# Patient Record
Sex: MALE | Race: AMERICAN INDIAN OR ALASKA NATIVE | NOT HISPANIC OR LATINO | Employment: PART TIME | URBAN - METROPOLITAN AREA
[De-identification: names, ages, dates, MRNs, and addresses within clinical notes are randomized per-mention and may not be internally consistent; named-entity substitution may affect disease eponyms.]

---

## 2021-09-28 ENCOUNTER — TELEPHONE (OUTPATIENT)
Dept: EMERGENCY MEDICINE | Facility: CLINIC | Age: 46
End: 2021-09-28

## 2021-09-28 ENCOUNTER — NURSE TRIAGE (OUTPATIENT)
Dept: NURSING | Facility: CLINIC | Age: 46
End: 2021-09-28

## 2021-09-28 ENCOUNTER — HOSPITAL ENCOUNTER (EMERGENCY)
Facility: CLINIC | Age: 46
Discharge: HOME | End: 2021-09-28
Attending: EMERGENCY MEDICINE | Admitting: EMERGENCY MEDICINE
Payer: COMMERCIAL

## 2021-09-28 ENCOUNTER — APPOINTMENT (OUTPATIENT)
Dept: GENERAL RADIOLOGY | Facility: CLINIC | Age: 46
End: 2021-09-28
Attending: EMERGENCY MEDICINE
Payer: COMMERCIAL

## 2021-09-28 ENCOUNTER — PATIENT OUTREACH (OUTPATIENT)
Dept: CARE COORDINATION | Facility: CLINIC | Age: 46
End: 2021-09-28

## 2021-09-28 VITALS
DIASTOLIC BLOOD PRESSURE: 73 MMHG | HEIGHT: 68 IN | WEIGHT: 160 LBS | SYSTOLIC BLOOD PRESSURE: 105 MMHG | HEART RATE: 84 BPM | BODY MASS INDEX: 24.25 KG/M2 | OXYGEN SATURATION: 97 % | RESPIRATION RATE: 26 BRPM

## 2021-09-28 DIAGNOSIS — J18.9 PNEUMONIA OF RIGHT MIDDLE LOBE DUE TO INFECTIOUS ORGANISM: ICD-10-CM

## 2021-09-28 DIAGNOSIS — Z20.822 SUSPECTED COVID-19 VIRUS INFECTION: ICD-10-CM

## 2021-09-28 DIAGNOSIS — U07.1 CLINICAL DIAGNOSIS OF COVID-19: ICD-10-CM

## 2021-09-28 DIAGNOSIS — E87.6 HYPOKALEMIA: ICD-10-CM

## 2021-09-28 LAB
ALBUMIN SERPL-MCNC: 3.2 G/DL (ref 3.4–5)
ALP SERPL-CCNC: 50 U/L (ref 40–150)
ALT SERPL W P-5'-P-CCNC: 29 U/L (ref 0–70)
ANION GAP SERPL CALCULATED.3IONS-SCNC: 6 MMOL/L (ref 3–14)
AST SERPL W P-5'-P-CCNC: 28 U/L (ref 0–45)
BASOPHILS # BLD AUTO: 0 10E3/UL (ref 0–0.2)
BASOPHILS NFR BLD AUTO: 0 %
BILIRUB SERPL-MCNC: 0.2 MG/DL (ref 0.2–1.3)
BUN SERPL-MCNC: 14 MG/DL (ref 7–30)
CALCIUM SERPL-MCNC: 7.8 MG/DL (ref 8.5–10.1)
CHLORIDE BLD-SCNC: 107 MMOL/L (ref 94–109)
CO2 SERPL-SCNC: 24 MMOL/L (ref 20–32)
CREAT SERPL-MCNC: 1.1 MG/DL (ref 0.66–1.25)
D DIMER PPP FEU-MCNC: <0.27 UG/ML FEU (ref 0–0.5)
EOSINOPHIL # BLD AUTO: 0 10E3/UL (ref 0–0.7)
EOSINOPHIL NFR BLD AUTO: 0 %
ERYTHROCYTE [DISTWIDTH] IN BLOOD BY AUTOMATED COUNT: 11.7 % (ref 10–15)
GFR SERPL CREATININE-BSD FRML MDRD: 80 ML/MIN/1.73M2
GLUCOSE BLD-MCNC: 153 MG/DL (ref 70–99)
HCT VFR BLD AUTO: 40.6 % (ref 40–53)
HGB BLD-MCNC: 14.8 G/DL (ref 13.3–17.7)
IMM GRANULOCYTES # BLD: 0 10E3/UL
IMM GRANULOCYTES NFR BLD: 0 %
LYMPHOCYTES # BLD AUTO: 0.3 10E3/UL (ref 0.8–5.3)
LYMPHOCYTES NFR BLD AUTO: 10 %
MCH RBC QN AUTO: 29.9 PG (ref 26.5–33)
MCHC RBC AUTO-ENTMCNC: 36.5 G/DL (ref 31.5–36.5)
MCV RBC AUTO: 82 FL (ref 78–100)
MONOCYTES # BLD AUTO: 0.3 10E3/UL (ref 0–1.3)
MONOCYTES NFR BLD AUTO: 10 %
NEUTROPHILS # BLD AUTO: 2.3 10E3/UL (ref 1.6–8.3)
NEUTROPHILS NFR BLD AUTO: 80 %
NRBC # BLD AUTO: 0 10E3/UL
NRBC BLD AUTO-RTO: 0 /100
PLATELET # BLD AUTO: 160 10E3/UL (ref 150–450)
POTASSIUM BLD-SCNC: 3.2 MMOL/L (ref 3.4–5.3)
PROT SERPL-MCNC: 6.6 G/DL (ref 6.8–8.8)
RBC # BLD AUTO: 4.95 10E6/UL (ref 4.4–5.9)
SARS-COV-2 RNA RESP QL NAA+PROBE: POSITIVE
SODIUM SERPL-SCNC: 137 MMOL/L (ref 133–144)
WBC # BLD AUTO: 2.8 10E3/UL (ref 4–11)

## 2021-09-28 PROCEDURE — 36415 COLL VENOUS BLD VENIPUNCTURE: CPT | Performed by: EMERGENCY MEDICINE

## 2021-09-28 PROCEDURE — 85025 COMPLETE CBC W/AUTO DIFF WBC: CPT | Performed by: EMERGENCY MEDICINE

## 2021-09-28 PROCEDURE — 250N000013 HC RX MED GY IP 250 OP 250 PS 637: Performed by: EMERGENCY MEDICINE

## 2021-09-28 PROCEDURE — U0003 INFECTIOUS AGENT DETECTION BY NUCLEIC ACID (DNA OR RNA); SEVERE ACUTE RESPIRATORY SYNDROME CORONAVIRUS 2 (SARS-COV-2) (CORONAVIRUS DISEASE [COVID-19]), AMPLIFIED PROBE TECHNIQUE, MAKING USE OF HIGH THROUGHPUT TECHNOLOGIES AS DESCRIBED BY CMS-2020-01-R: HCPCS | Performed by: EMERGENCY MEDICINE

## 2021-09-28 PROCEDURE — 82040 ASSAY OF SERUM ALBUMIN: CPT | Performed by: EMERGENCY MEDICINE

## 2021-09-28 PROCEDURE — C9803 HOPD COVID-19 SPEC COLLECT: HCPCS | Performed by: EMERGENCY MEDICINE

## 2021-09-28 PROCEDURE — 93005 ELECTROCARDIOGRAM TRACING: CPT | Performed by: EMERGENCY MEDICINE

## 2021-09-28 PROCEDURE — 71045 X-RAY EXAM CHEST 1 VIEW: CPT

## 2021-09-28 PROCEDURE — 99285 EMERGENCY DEPT VISIT HI MDM: CPT | Mod: 25 | Performed by: EMERGENCY MEDICINE

## 2021-09-28 PROCEDURE — 93010 ELECTROCARDIOGRAM REPORT: CPT | Performed by: EMERGENCY MEDICINE

## 2021-09-28 PROCEDURE — 85379 FIBRIN DEGRADATION QUANT: CPT | Performed by: EMERGENCY MEDICINE

## 2021-09-28 RX ORDER — DOXYCYCLINE 100 MG/1
100 CAPSULE ORAL 2 TIMES DAILY
Qty: 10 CAPSULE | Refills: 0 | Status: ON HOLD | OUTPATIENT
Start: 2021-09-28 | End: 2021-10-10

## 2021-09-28 RX ORDER — POTASSIUM CHLORIDE 1500 MG/1
40 TABLET, EXTENDED RELEASE ORAL ONCE
Status: COMPLETED | OUTPATIENT
Start: 2021-09-28 | End: 2021-09-28

## 2021-09-28 RX ORDER — DOXYCYCLINE 100 MG/1
100 TABLET ORAL 2 TIMES DAILY
Qty: 10 TABLET | Refills: 0 | Status: SHIPPED | OUTPATIENT
Start: 2021-09-28 | End: 2021-10-04

## 2021-09-28 RX ADMIN — POTASSIUM CHLORIDE 40 MEQ: 20 TABLET, EXTENDED RELEASE ORAL at 05:58

## 2021-09-28 ASSESSMENT — ENCOUNTER SYMPTOMS
SHORTNESS OF BREATH: 1
LIGHT-HEADEDNESS: 1
EYE REDNESS: 0
DIARRHEA: 1
SORE THROAT: 0
DIFFICULTY URINATING: 0
MYALGIAS: 1
FEVER: 1
COUGH: 1
EYE PAIN: 0

## 2021-09-28 ASSESSMENT — MIFFLIN-ST. JEOR: SCORE: 1575.26

## 2021-09-28 NOTE — ED PROVIDER NOTES
Emergency Department Patient Sign-out       Brief HPI:  This is a 46 year old male signed out to me by Dr. PEG Chang at shift change at 6am .  See initial ED Provider note for details of the presentation, ED course and work-up. Plan of care at hand off is anticipate discharge if negative D.dimer with plan to treat for clinical suspicion with COVID-19 illness/ pneumonia. If positive D.Dimer further work-up to exclude pulmonary embolism vs CT imaging to evaluate for COVID-19 pneumonia. Patient presented with 5-day history of symptoms suspicious for COVID-19 illness.  Rapid COVID-19 testing is pending. Chest imaging revealing a right mid lung infiltrate laterally.  Patient had reported a witnessed syncopal event while sitting on the toilet which was observed by his spouse.  Due to unexplained syncope with reassuring EKG with suspicion of COVID-19 illness work-up was undertaken to exclude venous thromboembolism including pulmonary embolism. Hypokalemia- repleted.       Significant Events after my assuming care: D-dimer resulted and negative.  Patient was seen and examined after lab results and we discussed plan of care to treat for COVID-19 illness with presumption of secondary pneumonia (query atypical) with doxycycline twice daily x5 days pending confirmation of patient's COVID-19 test.  Lab, work-up, x-ray imaging and plan of care as discussed with the initial treating provider was reviewed with the patient.  We discussed quarantine measures and notification of coworkers and recent close contacts.  We also reviewed the importance of vaccinations patient confirmed that he is unvaccinated for COVID-19.  Patient inquired about therapies including N-acetylcysteine and colloidal silver and we had a long discussion about the benefits of COVID-19 vaccination.  Supportive care measures were reviewed including antipyretic dosing.   We also discussed establishing primary care and primary care referral was placed.   "Patient had no respiratory distress or increased work of breathing he was otherwise hemodynamically normal.   Patient expressed comfort, understanding agreement with the plan of care    Exam:   Patient Vitals for the past 24 hrs:   BP Temp src Pulse Resp SpO2 Height Weight   09/28/21 0434 113/74 Oral 90 18 98 % 1.727 m (5' 8\") 72.6 kg (160 lb)       ED RESULTS:   Results for orders placed or performed during the hospital encounter of 09/28/21 (from the past 24 hour(s))   CBC with platelets differential     Status: Abnormal    Collection Time: 09/28/21  5:14 AM    Narrative    The following orders were created for panel order CBC with platelets differential.  Procedure                               Abnormality         Status                     ---------                               -----------         ------                     CBC with platelets and d...[[id number]]  Abnormal            Final result                 Please view results for these tests on the individual orders.   Comprehensive metabolic panel     Status: Abnormal    Collection Time: 09/28/21  5:14 AM   Result Value Ref Range    Sodium 137 133 - 144 mmol/L    Potassium 3.2 (L) 3.4 - 5.3 mmol/L    Chloride 107 94 - 109 mmol/L    Carbon Dioxide (CO2) 24 20 - 32 mmol/L    Anion Gap 6 3 - 14 mmol/L    Urea Nitrogen 14 7 - 30 mg/dL    Creatinine 1.10 0.66 - 1.25 mg/dL    Calcium 7.8 (L) 8.5 - 10.1 mg/dL    Glucose 153 (H) 70 - 99 mg/dL    Alkaline Phosphatase 50 40 - 150 U/L    AST 28 0 - 45 U/L    ALT 29 0 - 70 U/L    Protein Total 6.6 (L) 6.8 - 8.8 g/dL    Albumin 3.2 (L) 3.4 - 5.0 g/dL    Bilirubin Total 0.2 0.2 - 1.3 mg/dL    GFR Estimate 80 >60 mL/min/1.73m2   CBC with platelets and differential     Status: Abnormal    Collection Time: 09/28/21  5:14 AM   Result Value Ref Range    WBC Count 2.8 (L) 4.0 - 11.0 10e3/uL    RBC Count 4.95 4.40 - 5.90 10e6/uL    Hemoglobin 14.8 13.3 - 17.7 g/dL    Hematocrit 40.6 40.0 - 53.0 %    MCV 82 78 - 100 fL    MCH " 29.9 26.5 - 33.0 pg    MCHC 36.5 31.5 - 36.5 g/dL    RDW 11.7 10.0 - 15.0 %    Platelet Count 160 150 - 450 10e3/uL    % Neutrophils 80 %    % Lymphocytes 10 %    % Monocytes 10 %    % Eosinophils 0 %    % Basophils 0 %    % Immature Granulocytes 0 %    NRBCs per 100 WBC 0 <1 /100    Absolute Neutrophils 2.3 1.6 - 8.3 10e3/uL    Absolute Lymphocytes 0.3 (L) 0.8 - 5.3 10e3/uL    Absolute Monocytes 0.3 0.0 - 1.3 10e3/uL    Absolute Eosinophils 0.0 0.0 - 0.7 10e3/uL    Absolute Basophils 0.0 0.0 - 0.2 10e3/uL    Absolute Immature Granulocytes 0.0 <=0.0 10e3/uL    Absolute NRBCs 0.0 10e3/uL   XR Chest Port 1 View     Status: None    Collection Time: 09/28/21  5:25 AM    Narrative    EXAM: XR CHEST PORT 1 VIEW  LOCATION: [facility]  DATE/TIME: 9/28/2021 5:11 AM    INDICATION: fever and cough  COMPARISON: 12/15/2019      Impression    IMPRESSION: Stable cardiomediastinal silhouette. Infiltrate right midlung laterally. Follow-up to confirm resolution. No pleural effusion.   D dimer quantitative     Status: Normal    Collection Time: 09/28/21  6:05 AM   Result Value Ref Range    D-Dimer Quantitative <0.27 0.00 - 0.50 ug/mL FEU    Narrative    This D-dimer assay is intended for use in conjunction with a clinical pretest probability assessment model to exclude pulmonary embolism (PE) and deep venous thrombosis (DVT) in outpatients suspected of PE or DVT. The cut-off value is 0.50 ug/mL FEU.       ED MEDICATIONS:   Medications   potassium chloride ER (KLOR-CON M) CR tablet 40 mEq (40 mEq Oral Given 9/28/21 0558)         Impression:    ICD-10-CM    1. Hypokalemia  E87.6    2. Pneumonia of right middle lobe due to infectious organism  J18.9 Primary Care Referral   3. Clinical diagnosis of COVID-19  U07.1 COVID-19 GetWell Loop Referral     Care Coordination Referral    Unvaccinated   4. Suspected COVID-19 virus infection  Z20.822        Plan:    Discharged home with clinical diagnosis of COVID-19  illness.  With right midlung lateral infiltrate placed on doxycycline twice daily x5 days for coverage in the event patient has atypical pneumonia.Cause of witnessed fainting event while defecating is not clear-query vasovagal in the context of COVID-19 illness with loose stools reported no abdominal pain and low suspicion for vascular catastrophe or cardiogenic cause.  Outpatient follow-up advised for repeat x-ray imaging to ensure interval resolution in the next 2 weeks.  Primary care referral placed.       MD Ryan Mondragon Ebenezer Tope, MD  09/28/21 9731

## 2021-09-28 NOTE — DISCHARGE INSTRUCTIONS
1) Your patient today suggest your symptoms are likely due to COVID-19 illness/pneumonia.  X imaging showed an infiltrate in the right midlung as reviewed.    2) We discussed what to expect with COVID-19 illness and approach to managing symptoms at home and the importance of vaccination and notification of close contacts and coworkers as required.    3) Antibiotics (doxycycline) prescribed due to pneumonia noted on x-ray imaging.  We have discussed supportive care measures at home including approved therapies for managing symptoms related to COVID-19 illness and pneumonia    4) A wound care clinic referral was placed to help with establishing follow-up care including if there are any medication needs, and additional coordination of care as needed.

## 2021-09-28 NOTE — ED TRIAGE NOTES
Patient reports near syncope this morning after getting up to urinate. Reports covid symptoms since 9/22.

## 2021-09-28 NOTE — TELEPHONE ENCOUNTER
Triage note:    Patient called to report his brother tested positive last week for covid. Patient states he got sick on Wednesday and had a fever with light productive cough, diahrrea, body soreness (subsided) . Patient has been taking tylenol every 5 hours and only eating soup since Wednesday. Woke up a hour ago and was passed out for 10-15 seconds as he was incoherent. Patient wife was a witness and called 911 but patient called it off. Patient states he feels very uneasy and different than usual.    Per protocol patient to call, but patient denied and stated he will go into ED with his wife.    Harriet Diana RN   09/28/21 3:53 AM  [employer] Nurse Advisor  COVID 19 Nurse Triage Plan/Patient Instructions    Please be aware that novel coronavirus (COVID-19) may be circulating in the community. If you develop symptoms such as fever, cough, or SOB or if you have concerns about the presence of another infection including coronavirus (COVID-19), please contact your health care provider or visit [email or web address].     Disposition/Instructions    Call to EMS/911 recommended. Follow protocol based instructions.     Bring Your Own Device:  Please also bring your smart device(s) (smart phones, tablets, laptops) and their charging cables for your personal use and to communicate with your care team during your visit.    Thank you for taking steps to prevent the spread of this virus.  o Limit your contact with others.  o Wear a simple mask to cover your cough.  o Wash your hands well and often.    Resources    [facility]: About COVID-19: [email or web address]    CDC: What to Do If You're Sick: www.cdc.gov/coronavirus/2019-ncov/about/steps-when-sick.html    CDC: Ending Home Isolation: www.cdc.gov/coronavirus/2019-ncov/hcp/disposition-in-home-patients.html     CDC: Caring for Someone: www.cdc.gov/coronavirus/2019-ncov/if-you-are-sick/care-for-someone.html     MD: Interim Guidance for  Hospital Discharge to Home: www.health.[facility].[place]/diseases/coronavirus/hcp/hospdischarge.pdf    [facility] clinical trials (COVID-19 research studies): [email or web address]     Below are the COVID-19 hotlines at the [place] Department of [facility] ([facility]). Interpreters are available.   o For health questions: Call 787-889-7700 or 1-530.962.7834 (7 a.m. to 7 p.m.)  o For questions about schools and childcare: Call 761-518-5375 or 1-466.435.8305 (7 a.m. to 7 p.m.)                     Reason for Disposition    [1] Symptoms of COVID-19 (e.g., cough, fever, SOB, or others) AND [2] within 14 days of EXPOSURE (close contact) with diagnosed or suspected COVID-19 patient    Sounds like a life-threatening emergency to the triager    Additional Information    Negative: SEVERE difficulty breathing (e.g., struggling for each breath, speaks in single words)    Negative: Difficult to awaken or acting confused (e.g., disoriented, slurred speech)    Negative: Bluish (or gray) lips or face now    Negative: Shock suspected (e.g., cold/pale/clammy skin, too weak to stand, low BP, rapid pulse)    Protocols used: CORONAVIRUS (COVID-19) EXPOSURE-A- 3.25, CORONAVIRUS (COVID-19) DIAGNOSED OR [id number]-BN 3.25

## 2021-09-28 NOTE — LETTER
September 28, 2021      To Whom It May Concern:      Percy Curtis was seen in our Emergency Department today, 09/28/21.  Please excuse him from missing work due to his evaluation in the emergency department.  He will benefit from further care including notification of appropriate contacts as discussed and reviewed.  This work excuse is valid until sober 10/12/2021.  Further follow-up may be required if symptoms worsen or new concerns.      Sincerely,             Tomas Davis MD

## 2021-09-28 NOTE — PROGRESS NOTES
"Care Coordination Hospital/ED Discharge Follow up Note    Hospital/ED Discharge date: 09/28/21    Reason/Diagnosis for Hospital/ED visit: COVID-19+, syncope, hypokalemia, dx with RML PNA    Are you feeling better, the same, or worse since your Hospital/ED visit? Better - states \"this is the best I've felt since Wednesday,\" just ate a full sandwich.  Has not had any recurrence of syncope since discharge.    Symptoms:     Cough -  occasional, slightly productive, not coughing anything up yet but more loose than it has been.  RN recommended taking Mucinex to help thin/loosen secretions.  Push fluids, warm fluids with honey    Shortness of breath:  no shortness of breath - Has been doing deep breathing exercises since Wednesday.  RN reiterated importance of continuing to do deep breathing exercises every 1-2 hours while awake     Chest pain:  No    Fever: No    Current temperature:  Hasn't checked since early this morning; 99.1 (O) during call     Headache:  No    Sore throat:  No    Nasal congestion:  Yes  - taking Flonase for hx of nasal congestion and allergies.     Nausea/vomiting/diarrhea:  Yes - still having a little bit of diarrhea but no nausea or vomiting.    Body aches/joint pains:  Yes - \"mild\"    Fatigue:  Yes - \"mild\"    Home treatment measures used and outcome:  Started alternating Extra Strength Tylenol and ibuprofen, every 3 hours, per ER direction, and feels this has been helpful.  Eating bananas to keep his potassium up, as well as drinking Powerade for electrolytes    Pulse Oximeter/Oxygen Questions:    Were you sent home with a pulse oximeter?  Yes    Are you currently utilizing the pulse oximeter?  Yes    Do you understand how to use the home oximeter?  Yes    What are your current oxygen saturation levels?  97%    Oxygen saturation levels in ED/IP:  98%    Were you sent home with home oxygen?  No      Medications:    Were you prescribed any new medications?  Yes - doxycycline BID x 5 days.  Pt " states he took his first dose this morning at 9am and plans to take 9am and 9pm until gone.    If yes, have you picked up these new medications?  Yes    Are the medications helping?  Yes    Do you have any questions about your new medications?  No       Follow Up:    Do you have a follow up appointment scheduled with your PCP or specialist?  No - pt prefers to wait for clinic to call from primary care referral placed in ER    Do you have a plan in place in the event of an emergency?  Yes    If patient is established or planning to establish primary care within [facility] [place] [facility], Care Coordination was offered. Care Coordination accepted/declined:  No    GetWell Loop invitation received?  Hasn't checked, will look after call    GetWell Loop invitation accepted, pending patient activation or declined:  pending, RN encouraged participation      Claire Dubose RN  [facility] [place] [facility] Care Coordination

## 2021-09-28 NOTE — TELEPHONE ENCOUNTER
"Coronavirus (COVID-19) Notification    Caller Name (Patient, parent, daughter/son, grandparent, etc)  patient    Reason for call  Notify of Positive Coronavirus (COVID-19) lab results, assess symptoms,  review [facility] recommendations    Lab Result    Lab test:  2019-nCoV rRt-PCR or SARS-CoV-2 PCR    Oropharyngeal AND/OR nasopharyngeal swabs is POSITIVE for 2019-nCoV RNA/SARS-COV-2 PCR (COVID-19 virus)    RN Recommendations/Instructions per [facility] Coronavirus COVID-19 recommendations    Brief introduction script  Introduce self then review script:  \"I am calling on behalf of [employer].  We were notified that your Coronavirus test (COVID-19) for was POSITIVE for the virus.  I have some information to relay to you but first I wanted to mention that the [place] Dept of Health will be contacting you shortly [it's possible MD already called Patient] to talk to you more about how you are feeling and other people you have had contact with who might now also have the virus.  Also, [facility] is Partnering with the [facility] for Covid-19 research, you may be contacted directly by research staff.\"    Assessment (Inquire about Patient's current symptoms)   Assessment   Current Symptoms at time of phone call: (if no symptoms, document No symptoms] Low grade fever, fatigue, back pain, chills, nausea, diarrhea, cough   Symptoms onset (if applicable) 9/22/2021     If at time of call, Patients symptoms hare worsened, the Patient should contact 911 or have someone drive them to Emergency Dept promptly:      If Patient calling 911, inform 911 personal that you have tested positive for the Coronavirus (COVID-19).  Place mask on and await 911 to arrive.    If Emergency Dept, If possible, please have another adult drive you to the Emergency Dept but you need to wear mask when in contact with other people.      Monoclonal Antibody Administration    You may be eligible to receive a new treatment with a " "monoclonal antibody for preventing hospitalization in patients at high risk for complications from COVID-19.   This medication is still experimental and available on a limited basis; it is given through an IV and must be given at an infusion center. Please note that not all people who are eligible will receive the medication since it is in limited supply.     Are you interested in being considered for this medication?  Yes.   Is the patient symptomatic?  Yes. Is the patient 18 years of age or older? Yes.  Is the patient newly (within the last week) on supplemental oxygen or requiring more oxygen than usual?  No. Patient criteria for selection: Is the patients weight equal to or greater than 40 kg (88 lbs)? Yes.  Is the patient's age 65 years or older?  No. Is the patient 55 years or older and have one or more of the following conditions: Hypertension, CHF, COPD/Chronic pulmonary disease?  No. Is the patient 18 years or older and has one or more of the following conditions: Chronic Kidney Disease, Diabetes Mellitus, BMI equal to or greater than 35, Immunosuppressive Disease or taking Immunosuppressive medications?  No.  Patient does not qualify.  Does the patient fit the criteria: No     If patient qualifies based on above criteria:  \"You will be contacted if you are selected to receive this treatment in the next 1-2 business days.   This is time sensitive and if you are not selected in the next 1-2 business days, you will not receive the medication.  If you do not receive a call to schedule, you have not been selected.\"      Review information with Patient    Your result was positive. This means you have COVID-19 (coronavirus).  We have sent you a letter that reviews the information that I'll be reviewing with you now.    How can I protect others?    If you have symptoms: stay home and away from others (self-isolate) until:    You've had no fever--and no medicine that reduces fever--for 1 full day (24 hours). And   "     Your other symptoms have gotten better. For example, your cough or breathing has improved. And     At least 10 days have passed since your symptoms started. (If you've been told by a doctor that you have a weak immune system, wait 20 days.)     If you don't have symptoms: Stay home and away from others (self-isolate) until at least 10 days have passed since your first positive COVID-19 test. (Date test collected)    During this time:    Stay in your own room, including for meals. Use your own bathroom if you can.    Stay away from others in your home. No hugging, kissing or shaking hands. No visitors.     Don't go to work, school or anywhere else.     Clean  high touch  surfaces often (doorknobs, counters, handles, etc.). Use a household cleaning spray or wipes. You'll find a full list on the EPA website at www.epa.gov/pesticide-registration/list-n-disinfectants-use-against-sars-cov-2.     Cover your mouth and nose with a mask, tissue or other face covering to avoid spreading germs.    Wash your hands and face often with soap and water.    Make a list of people you have been in close contact with recently, even if either of you wore a face covering.   ; Start your list from 2 days before you became ill or had a positive test.  ; Include anyone that was within 6 feet of you for a cumulative total of 15 minutes or more in 24 hours. (Example: if you sat next to Gaurav for 5 minutes in the morning and 10 minutes in the afternoon, then you were in close contact for 15 minutes total that day. Gaurav would be added to your list.)    A public health worker will call or text you. It is important that you answer. They will ask you questions about possible exposures to COVID-19, such as people you have been in direct contact with and places you have visited.    Tell the people on your list that you have COVID-19; they should stay away from others for 14 days starting from the last time they were in contact with you (unless you  are told something different from a public health worker).     Caregivers in these groups are at risk for severe illness due to COVID-19:  o People 65 years and older  o People who live in a nursing home or long-term care facility  o People with chronic disease (lung, heart, cancer, diabetes, kidney, liver, immunologic)  o People who have a weakened immune system, including those who:  - Are in cancer treatment  - Take medicine that weakens the immune system, such as corticosteroids  - Had a bone marrow or organ transplant  - Have an immune deficiency  - Have poorly controlled HIV or AIDS  - Are obese (body mass index of 40 or higher)  - Smoke regularly    Caregivers should wear gloves while washing dishes, handling laundry and cleaning bedrooms and bathrooms.    Wash and dry laundry with special caution. Don't shake dirty laundry, and use the warmest water setting you can.    If you have a weakened immune system, ask your doctor about other actions you should take.    For more tips, go to www.cdc.gov/coronavirus/2019-ncov/downloads/10Things.pdf.    You should not go back to work until you meet the guidelines above for ending your home isolation. You don't need to be retested for COVID-19 before going back to work--studies show that you won't spread the virus if it's been at least 10 days since your symptoms started (or 20 days, if you have a weak immune system).    Employers: This document serves as formal notice of your employee's medical guidelines for going back to work. They must meet the above guidelines before going back to work in person.    How can I take care of myself?    1. Get lots of rest. Drink extra fluids (unless a doctor has told you not to).    2. Take Tylenol (acetaminophen) for fever or pain. If you have liver or kidney problems, ask your family doctor if it's okay to take Tylenol.     Take either:     650 mg (two 325 mg pills) every 4 to 6 hours, or     1,000 mg (two 500 mg pills) every 8 hours  as needed.     Note: Don't take more than 3,000 mg in one day. Acetaminophen is found in many medicines (both prescribed and over-the-counter medicines). Read all labels to be sure you don't take too much.    For children, check the Tylenol bottle for the right dose (based on their age or weight).    3. If you have other health problems (like cancer, heart failure, an organ transplant or severe kidney disease): Call your specialty clinic if you don't feel better in the next 2 days.    4. Know when to call 911: Emergency warning signs include:    Trouble breathing or shortness of breath    Pain or pressure in the chest that doesn't go away    Feeling confused like you haven't felt before, or not being able to wake up    Bluish-colored lips or face    5. Sign up for [employer]. We know it's scary to hear that you have COVID-19. We want to track your symptoms to make sure you're okay over the next 2 weeks. Please look for an email from [employer]--this is a free, online program that we'll use to keep in touch. To sign up, follow the link in the email. Learn more at [email or web address].    Where can I get more information?    [facility]: [email or web address]    Coronavirus Basics: www.health.[facility].[place]/diseases/coronavirus/basics.html    What to Do If You're Sick: www.cdc.gov/coronavirus/2019-ncov/about/steps-when-sick.html    Ending Home Isolation: www.cdc.gov/coronavirus/2019-ncov/hcp/disposition-in-home-patients.html     Caring for Someone with COVID-19: www.cdc.gov/coronavirus/2019-ncov/if-you-are-sick/care-for-someone.html     [facility] clinical trials (COVID-19 research studies): [email or web address]     A Positive COVID-19 letter will be sent via [employer] or the mail. (Exception, no letters sent to Presurgerical/Preprocedure Patients)    Yarely Person LPN

## 2021-09-28 NOTE — ED PROVIDER NOTES
"  History     Chief Complaint   Patient presents with     Covid Concern     HPI  Percy Curtis is a 46 year old male with known COVID exposure and 5 days of fever, diarrhea, unproductive cough, lightheadedness, myalgias. Not immunized against COVID. Non smoker. Felt like was going to pass out but remained standing. Describes 10-15 second episode where he states he was passed out however was able to remain upright. States that wife told him the his lips were white and he wasn't responding. No chest pain.     The patient's PMHx, Surgical Hx, Allergies, and Medications were all reviewed with the patient.    Allergies:  Allergies   Allergen Reactions     Penicillins Unknown       Problem List:    There are no problems to display for this patient.       Past Medical History:    No past medical history on file.    Past Surgical History:    No past surgical history on file.    Family History:    No family history on file.    Social History:  Marital Status:   [2]  Social History     Tobacco Use     Smoking status: Not on file   Substance Use Topics     Alcohol use: Not on file     Drug use: Not on file        Medications:    No current outpatient medications on file.        Review of Systems   Constitutional: Positive for fever.   HENT: Negative for sore throat.    Eyes: Negative for pain and redness.   Respiratory: Positive for cough and shortness of breath.    Cardiovascular: Negative for chest pain.   Gastrointestinal: Positive for diarrhea.   Genitourinary: Negative for difficulty urinating.   Musculoskeletal: Positive for myalgias.   Skin: Negative for rash.   Neurological: Positive for light-headedness.       Physical Exam   BP: 113/74  Pulse: 90  Resp: 18  Height: 172.7 cm (5' 8\")  Weight: 72.6 kg (160 lb)  SpO2: 98 %    Physical Exam  GEN: Awake, alert, and cooperative. No acute distress. Resting comfortably on cart.   HENT: MMM. External ears and nose normal bilaterally.   EYES: EOM intact. " Conjunctiva clear. No discharge.   NECK: Symmetric, freely mobile.   CV : Regular rate and rhythm. Brisk capillary refill.   PULM: Normal effort. Speaking in full sentences.   ABD: Soft, non-tender, non-distended. No rebound or guarding.   NEURO: Normal speech. Following commands. CN II-XII grossly intact. Answering questions and interacting appropriately.   EXT: No gross deformity. No lower extremity edema, tenderness, or erythema.   INT: Warm. No diaphoresis. Normal color.        ED Course        Procedures          ECG: sinus rhythm with rate of 92 BPM.  Normal axis.  Normal R wave progression.  No ectopy.  No ectopy.  Sinus rhythm.     Critical Care time:  none               Results for orders placed or performed during the hospital encounter of 09/28/21 (from the past 24 hour(s))   CBC with platelets differential    Narrative    The following orders were created for panel order CBC with platelets differential.  Procedure                               Abnormality         Status                     ---------                               -----------         ------                     CBC with platelets and d...[[id number]]  Abnormal            Final result                 Please view results for these tests on the individual orders.   Comprehensive metabolic panel   Result Value Ref Range    Sodium 137 133 - 144 mmol/L    Potassium 3.2 (L) 3.4 - 5.3 mmol/L    Chloride 107 94 - 109 mmol/L    Carbon Dioxide (CO2) 24 20 - 32 mmol/L    Anion Gap 6 3 - 14 mmol/L    Urea Nitrogen 14 7 - 30 mg/dL    Creatinine 1.10 0.66 - 1.25 mg/dL    Calcium 7.8 (L) 8.5 - 10.1 mg/dL    Glucose 153 (H) 70 - 99 mg/dL    Alkaline Phosphatase 50 40 - 150 U/L    AST 28 0 - 45 U/L    ALT 29 0 - 70 U/L    Protein Total 6.6 (L) 6.8 - 8.8 g/dL    Albumin 3.2 (L) 3.4 - 5.0 g/dL    Bilirubin Total 0.2 0.2 - 1.3 mg/dL    GFR Estimate 80 >60 mL/min/1.73m2   CBC with platelets and differential   Result Value Ref Range    WBC Count 2.8 (L) 4.0 - 11.0  10e3/uL    RBC Count 4.95 4.40 - 5.90 10e6/uL    Hemoglobin 14.8 13.3 - 17.7 g/dL    Hematocrit 40.6 40.0 - 53.0 %    MCV 82 78 - 100 fL    MCH 29.9 26.5 - 33.0 pg    MCHC 36.5 31.5 - 36.5 g/dL    RDW 11.7 10.0 - 15.0 %    Platelet Count 160 150 - 450 10e3/uL    % Neutrophils 80 %    % Lymphocytes 10 %    % Monocytes 10 %    % Eosinophils 0 %    % Basophils 0 %    % Immature Granulocytes 0 %    NRBCs per 100 WBC 0 <1 /100    Absolute Neutrophils 2.3 1.6 - 8.3 10e3/uL    Absolute Lymphocytes 0.3 (L) 0.8 - 5.3 10e3/uL    Absolute Monocytes 0.3 0.0 - 1.3 10e3/uL    Absolute Eosinophils 0.0 0.0 - 0.7 10e3/uL    Absolute Basophils 0.0 0.0 - 0.2 10e3/uL    Absolute Immature Granulocytes 0.0 <=0.0 10e3/uL    Absolute NRBCs 0.0 10e3/uL   XR Chest Port 1 View    Narrative    EXAM: XR CHEST PORT 1 VIEW  LOCATION: [facility]  DATE/TIME: 9/28/2021 5:11 AM    INDICATION: fever and cough  COMPARISON: 12/15/2019      Impression    IMPRESSION: Stable cardiomediastinal silhouette. Infiltrate right midlung laterally. Follow-up to confirm resolution. No pleural effusion.       Medications   potassium chloride ER (KLOR-CON M) CR tablet 40 mEq (40 mEq Oral Given 9/28/21 0558)       Assessments & Plan (with Medical Decision Making)   46 year old male with 5 days of cold-like symptoms with known positive exposure preceding symptom onset who presents after near syncopal event.  ECG per evidence of acute ischemia or ectopy.  Reassuring vital signs.  Able to speak in full sentences and no hypoxia while emergency department.  Reassuring abdominal exam.  No clinical signs of DVT.  Basic labs notable for leukopenia and lymphopenia with WBC of 2.8 and absolute lymphocyte count of 0.3.  CMP notable for mild hypokalemia potassium of 3.2.  Portable chest x-ray with right midlung infiltrate.  D-dimer pending.    Is under my shift and care of patient signed out to Dr. Antibiotic.  Will replete potassium and 40 mill  equivalents of oral potassium ordered.  If D-dimer is positive then will obtain CT scan of chest to evaluate for pulmonary embolism.  If dimer is not elevated then no further evaluation for VTE necessary.    I have reviewed the nursing notes.         New Prescriptions    No medications on file       Final diagnoses:   Hypokalemia   Pneumonia of right middle lobe due to infectious organism     Christopher Chang MD    9/28/2021   [facility] EMERGENCY DEPT    Disclaimer: This note consists of words and symbols derived from keyboarding and dictation using voice recognition software.  As a result, there may be errors that have gone undetected.  Please consider this when interpreting information found in this note.             Christopher Chang MD  09/28/21 3228

## 2021-10-02 ENCOUNTER — NURSE TRIAGE (OUTPATIENT)
Dept: NURSING | Facility: CLINIC | Age: 46
End: 2021-10-02

## 2021-10-02 NOTE — TELEPHONE ENCOUNTER
He is calling with worsening symptoms of covid and pneumonia.  He was able to cough up something yesterday for the first time.  He is on the last day of his antibiotics and is concerned with his oxygen levels.  His saturations are 94 to 95% .    Care advice is to come in to the ED to be evaluated for worsening symptoms of covid and pneumonia.    Patient verbalized understanding and agrees with plan.    Haylie Simmons Nurse Triage Advisor 9:28 AM 10/2/2021    Reason for Disposition    MODERATE difficulty breathing (e.g., speaks in phrases, SOB even at rest, pulse 100-120)    Additional Information    Negative: SEVERE difficulty breathing (e.g., struggling for each breath, speaks in single words)    Negative: Difficult to awaken or acting confused (e.g., disoriented, slurred speech)    Negative: Bluish (or gray) lips or face now    Negative: Shock suspected (e.g., cold/pale/clammy skin, too weak to stand, low BP, rapid pulse)    Negative: Sounds like a life-threatening emergency to the triager    Negative: SEVERE or constant chest pain or pressure (Exception: mild central chest pain, present only when coughing)    Protocols used: CORONAVIRUS (COVID-19) DIAGNOSED OR [id number] 3.25

## 2021-10-04 ENCOUNTER — APPOINTMENT (OUTPATIENT)
Dept: CT IMAGING | Facility: CLINIC | Age: 46
End: 2021-10-04
Attending: EMERGENCY MEDICINE
Payer: COMMERCIAL

## 2021-10-04 ENCOUNTER — HOSPITAL ENCOUNTER (INPATIENT)
Facility: CLINIC | Age: 46
LOS: 6 days | Discharge: HOME | End: 2021-10-10
Attending: EMERGENCY MEDICINE | Admitting: INTERNAL MEDICINE
Payer: COMMERCIAL

## 2021-10-04 DIAGNOSIS — R09.02 HYPOXIA: ICD-10-CM

## 2021-10-04 DIAGNOSIS — U07.1 PNEUMONIA DUE TO 2019 NOVEL CORONAVIRUS: ICD-10-CM

## 2021-10-04 DIAGNOSIS — J12.82 PNEUMONIA DUE TO 2019 NOVEL CORONAVIRUS: ICD-10-CM

## 2021-10-04 PROBLEM — R79.89 LFT ELEVATION: Status: ACTIVE | Noted: 2021-10-04

## 2021-10-04 PROBLEM — E87.6 HYPOKALEMIA: Status: ACTIVE | Noted: 2021-10-04

## 2021-10-04 PROBLEM — J96.01 ACUTE RESPIRATORY FAILURE WITH HYPOXIA (H): Status: ACTIVE | Noted: 2021-10-04

## 2021-10-04 LAB
ABO/RH(D): NORMAL
ALBUMIN SERPL-MCNC: 2.5 G/DL (ref 3.4–5)
ALP SERPL-CCNC: 73 U/L (ref 40–150)
ALT SERPL W P-5'-P-CCNC: 140 U/L (ref 0–70)
ANION GAP SERPL CALCULATED.3IONS-SCNC: 10 MMOL/L (ref 3–14)
APAP SERPL-MCNC: <2 MG/L (ref 10–30)
AST SERPL W P-5'-P-CCNC: 208 U/L (ref 0–45)
BASE EXCESS BLDV CALC-SCNC: 4.8 MMOL/L (ref -7.7–1.9)
BASOPHILS # BLD AUTO: 0 10E3/UL (ref 0–0.2)
BASOPHILS NFR BLD AUTO: 0 %
BILIRUB SERPL-MCNC: 0.5 MG/DL (ref 0.2–1.3)
BUN SERPL-MCNC: 16 MG/DL (ref 7–30)
CALCIUM SERPL-MCNC: 8.6 MG/DL (ref 8.5–10.1)
CHLORIDE BLD-SCNC: 101 MMOL/L (ref 94–109)
CO2 SERPL-SCNC: 24 MMOL/L (ref 20–32)
CREAT SERPL-MCNC: 0.99 MG/DL (ref 0.66–1.25)
CRP SERPL-MCNC: 214 MG/L (ref 0–8)
EOSINOPHIL # BLD AUTO: 0 10E3/UL (ref 0–0.7)
EOSINOPHIL NFR BLD AUTO: 0 %
ERYTHROCYTE [DISTWIDTH] IN BLOOD BY AUTOMATED COUNT: 11.9 % (ref 10–15)
FIBRINOGEN PPP-MCNC: 696 MG/DL (ref 170–490)
GFR SERPL CREATININE-BSD FRML MDRD: 90 ML/MIN/1.73M2
GLUCOSE BLD-MCNC: 100 MG/DL (ref 70–99)
HCO3 BLDV-SCNC: 28 MMOL/L (ref 21–28)
HCT VFR BLD AUTO: 41.3 % (ref 40–53)
HGB BLD-MCNC: 14.5 G/DL (ref 13.3–17.7)
IMM GRANULOCYTES # BLD: 0.1 10E3/UL
IMM GRANULOCYTES NFR BLD: 1 %
INR PPP: 1.06 (ref 0.85–1.15)
LDH SERPL L TO P-CCNC: 635 U/L (ref 85–227)
LIPASE SERPL-CCNC: 227 U/L (ref 73–393)
LYMPHOCYTES # BLD AUTO: 0.4 10E3/UL (ref 0.8–5.3)
LYMPHOCYTES NFR BLD AUTO: 5 %
MCH RBC QN AUTO: 29.1 PG (ref 26.5–33)
MCHC RBC AUTO-ENTMCNC: 35.1 G/DL (ref 31.5–36.5)
MCV RBC AUTO: 83 FL (ref 78–100)
MONOCYTES # BLD AUTO: 0.2 10E3/UL (ref 0–1.3)
MONOCYTES NFR BLD AUTO: 3 %
NEUTROPHILS # BLD AUTO: 6.8 10E3/UL (ref 1.6–8.3)
NEUTROPHILS NFR BLD AUTO: 91 %
NRBC # BLD AUTO: 0 10E3/UL
NRBC BLD AUTO-RTO: 0 /100
NT-PROBNP SERPL-MCNC: 314 PG/ML (ref 0–450)
O2/TOTAL GAS SETTING VFR VENT: 0 %
PCO2 BLDV: 35 MM HG (ref 40–50)
PH BLDV: 7.51 [PH] (ref 7.32–7.43)
PLATELET # BLD AUTO: 221 10E3/UL (ref 150–450)
PO2 BLDV: 23 MM HG (ref 25–47)
POTASSIUM BLD-SCNC: 3.1 MMOL/L (ref 3.4–5.3)
POTASSIUM BLD-SCNC: 3.2 MMOL/L (ref 3.4–5.3)
PROT SERPL-MCNC: 7.2 G/DL (ref 6.8–8.8)
RBC # BLD AUTO: 4.98 10E6/UL (ref 4.4–5.9)
SODIUM SERPL-SCNC: 135 MMOL/L (ref 133–144)
SPECIMEN EXPIRATION DATE: NORMAL
TROPONIN I SERPL-MCNC: <0.015 UG/L (ref 0–0.04)
WBC # BLD AUTO: 7.5 10E3/UL (ref 4–11)

## 2021-10-04 PROCEDURE — 86140 C-REACTIVE PROTEIN: CPT | Performed by: EMERGENCY MEDICINE

## 2021-10-04 PROCEDURE — 82803 BLOOD GASES ANY COMBINATION: CPT | Performed by: EMERGENCY MEDICINE

## 2021-10-04 PROCEDURE — 250N000011 HC RX IP 250 OP 636: Performed by: EMERGENCY MEDICINE

## 2021-10-04 PROCEDURE — 99285 EMERGENCY DEPT VISIT HI MDM: CPT | Mod: 25 | Performed by: EMERGENCY MEDICINE

## 2021-10-04 PROCEDURE — 36415 COLL VENOUS BLD VENIPUNCTURE: CPT | Performed by: PHYSICIAN ASSISTANT

## 2021-10-04 PROCEDURE — 85610 PROTHROMBIN TIME: CPT | Performed by: PHYSICIAN ASSISTANT

## 2021-10-04 PROCEDURE — 83615 LACTATE (LD) (LDH) ENZYME: CPT | Performed by: EMERGENCY MEDICINE

## 2021-10-04 PROCEDURE — 85384 FIBRINOGEN ACTIVITY: CPT | Performed by: EMERGENCY MEDICINE

## 2021-10-04 PROCEDURE — 71275 CT ANGIOGRAPHY CHEST: CPT

## 2021-10-04 PROCEDURE — 99223 1ST HOSP IP/OBS HIGH 75: CPT | Mod: AI | Performed by: PHYSICIAN ASSISTANT

## 2021-10-04 PROCEDURE — 80143 DRUG ASSAY ACETAMINOPHEN: CPT | Performed by: PHYSICIAN ASSISTANT

## 2021-10-04 PROCEDURE — 120N000001 HC R&B MED SURG/OB

## 2021-10-04 PROCEDURE — 36415 COLL VENOUS BLD VENIPUNCTURE: CPT | Performed by: EMERGENCY MEDICINE

## 2021-10-04 PROCEDURE — 85025 COMPLETE CBC W/AUTO DIFF WBC: CPT | Performed by: EMERGENCY MEDICINE

## 2021-10-04 PROCEDURE — 250N000013 HC RX MED GY IP 250 OP 250 PS 637: Performed by: EMERGENCY MEDICINE

## 2021-10-04 PROCEDURE — 250N000013 HC RX MED GY IP 250 OP 250 PS 637: Performed by: PHYSICIAN ASSISTANT

## 2021-10-04 PROCEDURE — 80053 COMPREHEN METABOLIC PANEL: CPT | Performed by: EMERGENCY MEDICINE

## 2021-10-04 PROCEDURE — 96375 TX/PRO/DX INJ NEW DRUG ADDON: CPT | Performed by: EMERGENCY MEDICINE

## 2021-10-04 PROCEDURE — 83690 ASSAY OF LIPASE: CPT | Performed by: EMERGENCY MEDICINE

## 2021-10-04 PROCEDURE — 83880 ASSAY OF NATRIURETIC PEPTIDE: CPT | Performed by: EMERGENCY MEDICINE

## 2021-10-04 PROCEDURE — 84132 ASSAY OF SERUM POTASSIUM: CPT | Performed by: PHYSICIAN ASSISTANT

## 2021-10-04 PROCEDURE — 250N000009 HC RX 250: Performed by: EMERGENCY MEDICINE

## 2021-10-04 PROCEDURE — 96366 THER/PROPH/DIAG IV INF ADDON: CPT | Performed by: EMERGENCY MEDICINE

## 2021-10-04 PROCEDURE — 250N000011 HC RX IP 250 OP 636: Performed by: PHYSICIAN ASSISTANT

## 2021-10-04 PROCEDURE — 84484 ASSAY OF TROPONIN QUANT: CPT | Performed by: PHYSICIAN ASSISTANT

## 2021-10-04 PROCEDURE — 86900 BLOOD TYPING SEROLOGIC ABO: CPT | Performed by: PHYSICIAN ASSISTANT

## 2021-10-04 PROCEDURE — 96365 THER/PROPH/DIAG IV INF INIT: CPT | Performed by: EMERGENCY MEDICINE

## 2021-10-04 PROCEDURE — 84132 ASSAY OF SERUM POTASSIUM: CPT | Performed by: EMERGENCY MEDICINE

## 2021-10-04 PROCEDURE — 99285 EMERGENCY DEPT VISIT HI MDM: CPT | Performed by: EMERGENCY MEDICINE

## 2021-10-04 RX ORDER — DEXAMETHASONE SODIUM PHOSPHATE 10 MG/ML
6 INJECTION, SOLUTION INTRAMUSCULAR; INTRAVENOUS ONCE
Status: DISCONTINUED | OUTPATIENT
Start: 2021-10-04 | End: 2021-10-04

## 2021-10-04 RX ORDER — BENZONATATE 100 MG/1
100 CAPSULE ORAL 3 TIMES DAILY PRN
Status: DISCONTINUED | OUTPATIENT
Start: 2021-10-04 | End: 2021-10-10 | Stop reason: HOSPADM

## 2021-10-04 RX ORDER — IBUPROFEN 200 MG
400 TABLET ORAL EVERY 4 HOURS PRN
COMMUNITY
End: 2021-11-18

## 2021-10-04 RX ORDER — NALOXONE HYDROCHLORIDE 0.4 MG/ML
0.4 INJECTION, SOLUTION INTRAMUSCULAR; INTRAVENOUS; SUBCUTANEOUS
Status: CANCELLED | OUTPATIENT
Start: 2021-10-04

## 2021-10-04 RX ORDER — ACETAMINOPHEN 325 MG/1
650 TABLET ORAL EVERY 4 HOURS PRN
Status: DISCONTINUED | OUTPATIENT
Start: 2021-10-04 | End: 2021-10-10 | Stop reason: HOSPADM

## 2021-10-04 RX ORDER — HYDROMORPHONE HYDROCHLORIDE 1 MG/ML
.3-.5 INJECTION, SOLUTION INTRAMUSCULAR; INTRAVENOUS; SUBCUTANEOUS EVERY 4 HOURS PRN
Status: CANCELLED | OUTPATIENT
Start: 2021-10-04

## 2021-10-04 RX ORDER — NALOXONE HYDROCHLORIDE 0.4 MG/ML
0.4 INJECTION, SOLUTION INTRAMUSCULAR; INTRAVENOUS; SUBCUTANEOUS
Status: DISCONTINUED | OUTPATIENT
Start: 2021-10-04 | End: 2021-10-10 | Stop reason: HOSPADM

## 2021-10-04 RX ORDER — NALOXONE HYDROCHLORIDE 0.4 MG/ML
0.2 INJECTION, SOLUTION INTRAMUSCULAR; INTRAVENOUS; SUBCUTANEOUS
Status: CANCELLED | OUTPATIENT
Start: 2021-10-04

## 2021-10-04 RX ORDER — SODIUM CHLORIDE 9 MG/ML
INJECTION, SOLUTION INTRAVENOUS CONTINUOUS
Status: CANCELLED | OUTPATIENT
Start: 2021-10-04

## 2021-10-04 RX ORDER — PROCHLORPERAZINE 25 MG
25 SUPPOSITORY, RECTAL RECTAL EVERY 12 HOURS PRN
Status: DISCONTINUED | OUTPATIENT
Start: 2021-10-04 | End: 2021-10-10 | Stop reason: HOSPADM

## 2021-10-04 RX ORDER — ONDANSETRON 4 MG/1
4 TABLET, ORALLY DISINTEGRATING ORAL EVERY 6 HOURS PRN
Status: DISCONTINUED | OUTPATIENT
Start: 2021-10-04 | End: 2021-10-10 | Stop reason: HOSPADM

## 2021-10-04 RX ORDER — ACETAMINOPHEN 500 MG
1000 TABLET ORAL EVERY 6 HOURS PRN
COMMUNITY

## 2021-10-04 RX ORDER — ONDANSETRON 2 MG/ML
4 INJECTION INTRAMUSCULAR; INTRAVENOUS EVERY 6 HOURS PRN
Status: DISCONTINUED | OUTPATIENT
Start: 2021-10-04 | End: 2021-10-10 | Stop reason: HOSPADM

## 2021-10-04 RX ORDER — OXYCODONE HYDROCHLORIDE 5 MG/1
5 TABLET ORAL EVERY 4 HOURS PRN
Status: DISCONTINUED | OUTPATIENT
Start: 2021-10-04 | End: 2021-10-10 | Stop reason: HOSPADM

## 2021-10-04 RX ORDER — NALOXONE HYDROCHLORIDE 0.4 MG/ML
0.2 INJECTION, SOLUTION INTRAMUSCULAR; INTRAVENOUS; SUBCUTANEOUS
Status: DISCONTINUED | OUTPATIENT
Start: 2021-10-04 | End: 2021-10-10 | Stop reason: HOSPADM

## 2021-10-04 RX ORDER — ALBUTEROL SULFATE 90 UG/1
6 AEROSOL, METERED RESPIRATORY (INHALATION) EVERY 6 HOURS PRN
Status: DISCONTINUED | OUTPATIENT
Start: 2021-10-04 | End: 2021-10-10 | Stop reason: HOSPADM

## 2021-10-04 RX ORDER — LIDOCAINE 40 MG/G
CREAM TOPICAL
Status: DISCONTINUED | OUTPATIENT
Start: 2021-10-04 | End: 2021-10-10 | Stop reason: HOSPADM

## 2021-10-04 RX ORDER — IOPAMIDOL 755 MG/ML
71 INJECTION, SOLUTION INTRAVASCULAR ONCE
Status: COMPLETED | OUTPATIENT
Start: 2021-10-04 | End: 2021-10-04

## 2021-10-04 RX ORDER — POTASSIUM CHLORIDE 7.45 MG/ML
10 INJECTION INTRAVENOUS ONCE
Status: COMPLETED | OUTPATIENT
Start: 2021-10-04 | End: 2021-10-04

## 2021-10-04 RX ORDER — IBUPROFEN 400 MG/1
800 TABLET, FILM COATED ORAL ONCE
Status: COMPLETED | OUTPATIENT
Start: 2021-10-04 | End: 2021-10-04

## 2021-10-04 RX ORDER — ACETAMINOPHEN, GUAIFENESIN, AND PHENYLEPHRINE HYDROCHLORIDE 650; 400; 10 MG/20ML; MG/20ML; MG/20ML
20 SOLUTION ORAL EVERY 4 HOURS PRN
COMMUNITY
End: 2021-11-18

## 2021-10-04 RX ORDER — OXYCODONE AND ACETAMINOPHEN 5; 325 MG/1; MG/1
1-2 TABLET ORAL EVERY 4 HOURS PRN
Status: CANCELLED | OUTPATIENT
Start: 2021-10-04

## 2021-10-04 RX ORDER — ONDANSETRON 4 MG/1
4 TABLET, ORALLY DISINTEGRATING ORAL EVERY 6 HOURS PRN
Status: CANCELLED | OUTPATIENT
Start: 2021-10-04

## 2021-10-04 RX ORDER — PROCHLORPERAZINE MALEATE 5 MG
10 TABLET ORAL EVERY 6 HOURS PRN
Status: DISCONTINUED | OUTPATIENT
Start: 2021-10-04 | End: 2021-10-10 | Stop reason: HOSPADM

## 2021-10-04 RX ORDER — DEXAMETHASONE SODIUM PHOSPHATE 10 MG/ML
6 INJECTION, SOLUTION INTRAMUSCULAR; INTRAVENOUS ONCE
Status: COMPLETED | OUTPATIENT
Start: 2021-10-04 | End: 2021-10-04

## 2021-10-04 RX ORDER — ACETAMINOPHEN 325 MG/1
975 TABLET ORAL EVERY 8 HOURS
Status: DISCONTINUED | OUTPATIENT
Start: 2021-10-04 | End: 2021-10-10 | Stop reason: HOSPADM

## 2021-10-04 RX ORDER — POTASSIUM CHLORIDE 1500 MG/1
40 TABLET, EXTENDED RELEASE ORAL ONCE
Status: COMPLETED | OUTPATIENT
Start: 2021-10-04 | End: 2021-10-04

## 2021-10-04 RX ORDER — ONDANSETRON 2 MG/ML
4 INJECTION INTRAMUSCULAR; INTRAVENOUS EVERY 6 HOURS PRN
Status: CANCELLED | OUTPATIENT
Start: 2021-10-04

## 2021-10-04 RX ADMIN — IOPAMIDOL 71 ML: 755 INJECTION, SOLUTION INTRAVENOUS at 17:32

## 2021-10-04 RX ADMIN — BENZONATATE 100 MG: 100 CAPSULE ORAL at 21:59

## 2021-10-04 RX ADMIN — POTASSIUM CHLORIDE 10 MEQ: 7.46 INJECTION, SOLUTION INTRAVENOUS at 19:13

## 2021-10-04 RX ADMIN — POTASSIUM CHLORIDE 40 MEQ: 1500 TABLET, EXTENDED RELEASE ORAL at 21:58

## 2021-10-04 RX ADMIN — IBUPROFEN 800 MG: 400 TABLET, FILM COATED ORAL at 16:27

## 2021-10-04 RX ADMIN — DEXAMETHASONE SODIUM PHOSPHATE 6 MG: 10 INJECTION, SOLUTION INTRAMUSCULAR; INTRAVENOUS at 19:11

## 2021-10-04 RX ADMIN — ALBUTEROL SULFATE 6 PUFF: 90 AEROSOL, METERED RESPIRATORY (INHALATION) at 16:28

## 2021-10-04 RX ADMIN — ACETAMINOPHEN 975 MG: 325 TABLET, FILM COATED ORAL at 21:59

## 2021-10-04 RX ADMIN — SODIUM CHLORIDE 98 ML: 9 INJECTION, SOLUTION INTRAVENOUS at 17:32

## 2021-10-04 RX ADMIN — ENOXAPARIN SODIUM 40 MG: 100 INJECTION SUBCUTANEOUS at 21:58

## 2021-10-04 ASSESSMENT — ACTIVITIES OF DAILY LIVING (ADL)
FALL_HISTORY_WITHIN_LAST_SIX_MONTHS: NO
HEARING_DIFFICULTY_OR_DEAF: NO
DRESSING/BATHING_DIFFICULTY: NO
DIFFICULTY_EATING/SWALLOWING: NO
CONCENTRATING,_REMEMBERING_OR_MAKING_DECISIONS_DIFFICULTY: NO
WALKING_OR_CLIMBING_STAIRS_DIFFICULTY: NO
VISION_MANAGEMENT: GLASSES
WEAR_GLASSES_OR_BLIND: YES
TOILETING_ISSUES: NO
DIFFICULTY_COMMUNICATING: NO
DOING_ERRANDS_INDEPENDENTLY_DIFFICULTY: NO

## 2021-10-04 ASSESSMENT — MIFFLIN-ST. JEOR: SCORE: 1612.5

## 2021-10-04 NOTE — ED PROVIDER NOTES
History     Chief Complaint   Patient presents with     Shortness of Breath     Pt tested COVID + 9/28/21 with pnumonia. Pt reports SPO2 was 88% this afternoon, took 1-2 minutes to get to 90%. Finished 5 day course of abx. HA, body aches.      HPI  Percy Curtis is a 46 year old male with no significant past medical history who presents emergency department planing of increasing shortness of breath with oxygen saturations between 88 and 89%.  Patient tested positive for Covid on 9/28/2021 and was treated with doxycycline for possible pneumonia on the chest x-ray.  States he has continued to have a cough and has shortness of breath with exertion this afternoon saturations have been between 88 and 90% and worsened with a bit of activity.  States he has a mild headache and has had generalized body ache with a nonproductive significant cough.  He denies visual changes has not had any abdominal pain nausea vomiting diarrhea or focal numbness weakness in extremity denies any bowel bladder dysfunction.  He has not had a rash.    Allergies:  Allergies   Allergen Reactions     Penicillins Unknown       Problem List:    There are no problems to display for this patient.       Past Medical History:    No past medical history on file.    Past Surgical History:    No past surgical history on file.    Family History:    No family history on file.    Social History:  Marital Status:   [2]  Social History     Tobacco Use     Smoking status: Not on file   Substance Use Topics     Alcohol use: Not on file     Drug use: Not on file        Medications:    No current outpatient medications on file.        Review of Systems  All systems reviewed and other than pertinent positives and negatives in HPI all other systems are negative.  Physical Exam   BP: 104/64  Pulse: 100  Temp: 100.2  F (37.9  C)  Resp: 18  Weight: 72.6 kg (160 lb)  SpO2: 92 %      Physical Exam  Vitals and nursing note reviewed.   Constitutional:        Appearance: He is well-developed. He is not ill-appearing, toxic-appearing or diaphoretic.      Comments: Mild respiratory distress.   HENT:      Head: Normocephalic and atraumatic.      Nose: Nose normal.      Mouth/Throat:      Mouth: Mucous membranes are moist.      Pharynx: Oropharynx is clear.   Eyes:      Conjunctiva/sclera: Conjunctivae normal.   Neck:      Comments: No JVD present  Cardiovascular:      Rate and Rhythm: Normal rate and regular rhythm.      Pulses: Normal pulses.      Heart sounds: Normal heart sounds. No murmur heard.     Pulmonary:      Comments: Faint expiratory wheeze with crackles at left base and breath sounds decreased at bases bilaterally.  Abdominal:      General: Abdomen is flat. Bowel sounds are normal. There is no distension.      Palpations: Abdomen is soft.      Tenderness: There is no abdominal tenderness. There is no right CVA tenderness, left CVA tenderness, guarding or rebound.   Musculoskeletal:         General: No swelling or tenderness. Normal range of motion.      Cervical back: Normal range of motion and neck supple.      Right lower leg: No edema.      Left lower leg: No edema.   Skin:     General: Skin is warm and dry.      Findings: No bruising or erythema.   Neurological:      General: No focal deficit present.      Mental Status: He is alert and oriented to person, place, and time.      Sensory: No sensory deficit.      Motor: No weakness.      Coordination: Coordination normal.      Deep Tendon Reflexes: Reflexes normal.   Psychiatric:         Mood and Affect: Mood normal.         ED Course        Procedures              Critical Care time:  was 40 minutes for this patient excluding procedures.  For management of hypoxia secondary to Covid.               Results for orders placed or performed during the hospital encounter of 10/04/21 (from the past 24 hour(s))   CBC with platelets differential    Narrative    The following orders were created for panel order CBC  with platelets differential.  Procedure                               Abnormality         Status                     ---------                               -----------         ------                     CBC with platelets and d...[[id number]]  Abnormal            Final result                 Please view results for these tests on the individual orders.   Comprehensive metabolic panel   Result Value Ref Range    Sodium 135 133 - 144 mmol/L    Potassium 3.1 (L) 3.4 - 5.3 mmol/L    Chloride 101 94 - 109 mmol/L    Carbon Dioxide (CO2) 24 20 - 32 mmol/L    Anion Gap 10 3 - 14 mmol/L    Urea Nitrogen 16 7 - 30 mg/dL    Creatinine 0.99 0.66 - 1.25 mg/dL    Calcium 8.6 8.5 - 10.1 mg/dL    Glucose 100 (H) 70 - 99 mg/dL    Alkaline Phosphatase 73 40 - 150 U/L     (H) 0 - 45 U/L     (H) 0 - 70 U/L    Protein Total 7.2 6.8 - 8.8 g/dL    Albumin 2.5 (L) 3.4 - 5.0 g/dL    Bilirubin Total 0.5 0.2 - 1.3 mg/dL    GFR Estimate 90 >60 mL/min/1.73m2   Lipase   Result Value Ref Range    Lipase 227 73 - 393 U/L   CRP inflammation   Result Value Ref Range    CRP Inflammation 214.0 (H) 0.0 - 8.0 mg/L   Nt probnp inpatient (BNP)   Result Value Ref Range    N terminal Pro BNP Inpatient 314 0 - 450 pg/mL   Blood gas venous   Result Value Ref Range    pH Venous 7.51 (H) 7.32 - 7.43    pCO2 Venous 35 (L) 40 - 50 mm Hg    pO2 Venous 23 (L) 25 - 47 mm Hg    Bicarbonate Venous 28 21 - 28 mmol/L    Base Excess/Deficit (+/-) 4.8 (H) -7.7 - 1.9 mmol/L    FIO2 0    CBC with platelets and differential   Result Value Ref Range    WBC Count 7.5 4.0 - 11.0 10e3/uL    RBC Count 4.98 4.40 - 5.90 10e6/uL    Hemoglobin 14.5 13.3 - 17.7 g/dL    Hematocrit 41.3 40.0 - 53.0 %    MCV 83 78 - 100 fL    MCH 29.1 26.5 - 33.0 pg    MCHC 35.1 31.5 - 36.5 g/dL    RDW 11.9 10.0 - 15.0 %    Platelet Count 221 150 - 450 10e3/uL    % Neutrophils 91 %    % Lymphocytes 5 %    % Monocytes 3 %    % Eosinophils 0 %    % Basophils 0 %    % Immature Granulocytes 1 %     NRBCs per 100 WBC 0 <1 /100    Absolute Neutrophils 6.8 1.6 - 8.3 10e3/uL    Absolute Lymphocytes 0.4 (L) 0.8 - 5.3 10e3/uL    Absolute Monocytes 0.2 0.0 - 1.3 10e3/uL    Absolute Eosinophils 0.0 0.0 - 0.7 10e3/uL    Absolute Basophils 0.0 0.0 - 0.2 10e3/uL    Absolute Immature Granulocytes 0.1 (H) <=0.0 10e3/uL    Absolute NRBCs 0.0 10e3/uL   ABO and Rh   Result Value Ref Range    ABO/RH(D) O POS     SPECIMEN EXPIRATION DATE [id number]    Troponin I   Result Value Ref Range    Troponin I <0.015 0.000 - 0.045 ug/L   CT Chest Pulmonary Embolism w Contrast    Narrative    EXAM: CT CHEST PULMONARY EMBOLISM W CONTRAST  LOCATION: [facility]  DATE/TIME: 10/4/2021 5:31 PM    INDICATION: PE suspected, high prob --history of Covid, increasing shortness of breath  COMPARISON: None.  TECHNIQUE: CT chest pulmonary angiogram during arterial phase injection of IV contrast. Multiplanar reformats and MIP reconstructions were performed. Dose reduction techniques were used.   CONTRAST: 71 mL Isovue 370    FINDINGS:  ANGIOGRAM CHEST: No evidence for pulmonary embolism. Pulmonary arteries normal in caliber. Thoracic aorta normal in caliber. No aortic dissection or other acute abnormality.    HEART: Cardiac chambers within normal limits. No pericardial effusion. No coronary artery calcification.    LUNGS AND PLEURA: Severe multifocal groundglass density with scattered areas of denser consolidation throughout the lungs compatible with COVID 19 pneumonia. No pleural effusion or pneumothorax.    MEDIASTINUM: No adenopathy or mass. Small hiatal hernia.    LIMITED UPPER ABDOMEN: Negative.    MUSCULOSKELETAL: Negative.      Impression    IMPRESSION:  1.  No evidence for pulmonary embolism.   2.  Severe pneumonia, appearance compatible with COVID 19 pneumonia.   Lactate Dehydrogenase   Result Value Ref Range    Lactate Dehydrogenase 635 (H) 85 - 227 U/L   Fibrinogen activity   Result Value Ref Range     Fibrinogen Activity 696 (H) 170 - 490 mg/dL   INR   Result Value Ref Range    INR 1.06 0.85 - 1.15   Acetaminophen level   Result Value Ref Range    Acetaminophen <2 (L) 10 - 30 mg/L   Potassium   Result Value Ref Range    Potassium 3.2 (L) 3.4 - 5.3 mmol/L   Potassium   Result Value Ref Range    Potassium 3.5 3.4 - 5.3 mmol/L   CBC with platelets   Result Value Ref Range    WBC Count 6.6 4.0 - 11.0 10e3/uL    RBC Count 4.98 4.40 - 5.90 10e6/uL    Hemoglobin 14.5 13.3 - 17.7 g/dL    Hematocrit 41.2 40.0 - 53.0 %    MCV 83 78 - 100 fL    MCH 29.1 26.5 - 33.0 pg    MCHC 35.2 31.5 - 36.5 g/dL    RDW 12.1 10.0 - 15.0 %    Platelet Count 253 150 - 450 10e3/uL   Fibrinogen activity   Result Value Ref Range    Fibrinogen Activity 760 (H) 170 - 490 mg/dL   CRP inflammation   Result Value Ref Range    CRP Inflammation 196.0 (H) 0.0 - 8.0 mg/L   D dimer quantitative   Result Value Ref Range    D-Dimer Quantitative 0.88 (H) 0.00 - 0.50 ug/mL FEU    Narrative    This D-dimer assay is intended for use in conjunction with a clinical pretest probability assessment model to exclude pulmonary embolism (PE) and deep venous thrombosis (DVT) in outpatients suspected of PE or DVT. The cut-off value is 0.50 ug/mL FEU.   Comprehensive metabolic panel   Result Value Ref Range    Sodium 140 133 - 144 mmol/L    Potassium 3.4 3.4 - 5.3 mmol/L    Chloride 108 94 - 109 mmol/L    Carbon Dioxide (CO2) 25 20 - 32 mmol/L    Anion Gap 7 3 - 14 mmol/L    Urea Nitrogen 18 7 - 30 mg/dL    Creatinine 0.77 0.66 - 1.25 mg/dL    Calcium 8.9 8.5 - 10.1 mg/dL    Glucose 138 (H) 70 - 99 mg/dL    Alkaline Phosphatase 75 40 - 150 U/L     (H) 0 - 45 U/L     (H) 0 - 70 U/L    Protein Total 6.7 (L) 6.8 - 8.8 g/dL    Albumin 2.4 (L) 3.4 - 5.0 g/dL    Bilirubin Total 0.4 0.2 - 1.3 mg/dL    GFR Estimate >90 >60 mL/min/1.73m2   Lactate Dehydrogenase   Result Value Ref Range    Lactate Dehydrogenase 657 (H) 85 - 227 U/L   Troponin I   Result Value Ref  Range    Troponin I <0.015 0.000 - 0.045 ug/L       Medications - No data to display    Assessments & Plan (with Medical Decision Making) records were reviewed.  Labs were obtained.  Patient was given an albuterol inhaler.  CBC was without significant abnormality.  Comprehensive metabolic panel significant for a potassium of 3.1.  AST and ALT were elevated at 208 and 140.  Venous blood gas with a pH of 7.51 CO2 of 35 O2 of 23.  proBNP within normal limits.  Lipase normal CRP elevated at 214.  CT PE protocol was obtained due to patient's shortness of breath and this revealed no evidence of PE but extensive severe infiltrates consistent with Covid pneumonia.  Patient was given Decadron and potassium IV.  His saturations were occasionally staying at 8089% on room air and therefore placed on oxygen.  Patient will need to be admitted to the hospital for further assessment and care.  Case was discussed with Ana ZEE with hospitalist service and she is in agreement with admitting the patient for further evaluation and care.  Findings and plan discussed with patient throughout the hospital stay and he is in agreement with admission..     I have reviewed the nursing notes.    I have reviewed the findings, diagnosis, plan and need for follow up with the patient.       New Prescriptions    No medications on file       Final diagnoses:   Pneumonia due to 2019 novel coronavirus   Hypoxia       10/4/2021   [facility] EMERGENCY DEPT     Ollie Sheikh MD  10/05/21 5923

## 2021-10-04 NOTE — ED NOTES
Decreased oxygen saturation at home.  Patient stated that his oxygen dropped to 88% for a couple of minutes.  Patient tested positive for COVID and pneumonia approximately 2 weeks ago and was on antibiotics.  Patient still having cough and fevers.

## 2021-10-04 NOTE — ED TRIAGE NOTES
Pt tested COVID + 9/28/21 with pnumonia. Pt reports SPO2 was 88% this afternoon, took 1-2 minutes to get to 90%. Finished 5 day course of abx. HA, body aches.

## 2021-10-05 LAB
ALBUMIN SERPL-MCNC: 2.4 G/DL (ref 3.4–5)
ALP SERPL-CCNC: 75 U/L (ref 40–150)
ALT SERPL W P-5'-P-CCNC: 141 U/L (ref 0–70)
ANION GAP SERPL CALCULATED.3IONS-SCNC: 7 MMOL/L (ref 3–14)
AST SERPL W P-5'-P-CCNC: 188 U/L (ref 0–45)
BILIRUB SERPL-MCNC: 0.4 MG/DL (ref 0.2–1.3)
BUN SERPL-MCNC: 18 MG/DL (ref 7–30)
CALCIUM SERPL-MCNC: 8.9 MG/DL (ref 8.5–10.1)
CHLORIDE BLD-SCNC: 108 MMOL/L (ref 94–109)
CO2 SERPL-SCNC: 25 MMOL/L (ref 20–32)
CREAT SERPL-MCNC: 0.77 MG/DL (ref 0.66–1.25)
CRP SERPL-MCNC: 196 MG/L (ref 0–8)
D DIMER PPP FEU-MCNC: 0.88 UG/ML FEU (ref 0–0.5)
ERYTHROCYTE [DISTWIDTH] IN BLOOD BY AUTOMATED COUNT: 12.1 % (ref 10–15)
FIBRINOGEN PPP-MCNC: 760 MG/DL (ref 170–490)
GFR SERPL CREATININE-BSD FRML MDRD: >90 ML/MIN/1.73M2
GLUCOSE BLD-MCNC: 138 MG/DL (ref 70–99)
HCT VFR BLD AUTO: 41.2 % (ref 40–53)
HCV AB SERPL QL IA: NONREACTIVE
HGB BLD-MCNC: 14.5 G/DL (ref 13.3–17.7)
LDH SERPL L TO P-CCNC: 657 U/L (ref 85–227)
MCH RBC QN AUTO: 29.1 PG (ref 26.5–33)
MCHC RBC AUTO-ENTMCNC: 35.2 G/DL (ref 31.5–36.5)
MCV RBC AUTO: 83 FL (ref 78–100)
PLATELET # BLD AUTO: 253 10E3/UL (ref 150–450)
POTASSIUM BLD-SCNC: 3.4 MMOL/L (ref 3.4–5.3)
POTASSIUM BLD-SCNC: 3.5 MMOL/L (ref 3.4–5.3)
PROT SERPL-MCNC: 6.7 G/DL (ref 6.8–8.8)
RBC # BLD AUTO: 4.98 10E6/UL (ref 4.4–5.9)
SODIUM SERPL-SCNC: 140 MMOL/L (ref 133–144)
TROPONIN I SERPL-MCNC: <0.015 UG/L (ref 0–0.04)
WBC # BLD AUTO: 6.6 10E3/UL (ref 4–11)

## 2021-10-05 PROCEDURE — 87389 HIV-1 AG W/HIV-1&-2 AB AG IA: CPT | Performed by: HOSPITALIST

## 2021-10-05 PROCEDURE — 999N000157 HC STATISTIC RCP TIME EA 10 MIN

## 2021-10-05 PROCEDURE — 250N000011 HC RX IP 250 OP 636: Performed by: HOSPITALIST

## 2021-10-05 PROCEDURE — 250N000012 HC RX MED GY IP 250 OP 636 PS 637: Performed by: PHYSICIAN ASSISTANT

## 2021-10-05 PROCEDURE — 36415 COLL VENOUS BLD VENIPUNCTURE: CPT | Performed by: INTERNAL MEDICINE

## 2021-10-05 PROCEDURE — 250N000013 HC RX MED GY IP 250 OP 250 PS 637: Performed by: EMERGENCY MEDICINE

## 2021-10-05 PROCEDURE — 258N000003 HC RX IP 258 OP 636: Performed by: HOSPITALIST

## 2021-10-05 PROCEDURE — 999N000215 HC STATISTIC HFNC ADULT NON-CPAP

## 2021-10-05 PROCEDURE — 84132 ASSAY OF SERUM POTASSIUM: CPT | Performed by: INTERNAL MEDICINE

## 2021-10-05 PROCEDURE — 84484 ASSAY OF TROPONIN QUANT: CPT | Performed by: PHYSICIAN ASSISTANT

## 2021-10-05 PROCEDURE — 85384 FIBRINOGEN ACTIVITY: CPT | Performed by: PHYSICIAN ASSISTANT

## 2021-10-05 PROCEDURE — 86140 C-REACTIVE PROTEIN: CPT | Performed by: PHYSICIAN ASSISTANT

## 2021-10-05 PROCEDURE — 87340 HEPATITIS B SURFACE AG IA: CPT | Performed by: HOSPITALIST

## 2021-10-05 PROCEDURE — 36415 COLL VENOUS BLD VENIPUNCTURE: CPT | Performed by: PHYSICIAN ASSISTANT

## 2021-10-05 PROCEDURE — 83615 LACTATE (LD) (LDH) ENZYME: CPT | Performed by: PHYSICIAN ASSISTANT

## 2021-10-05 PROCEDURE — XW033H5 INTRODUCTION OF TOCILIZUMAB INTO PERIPHERAL VEIN, PERCUTANEOUS APPROACH, NEW TECHNOLOGY GROUP 5: ICD-10-PCS | Performed by: HOSPITALIST

## 2021-10-05 PROCEDURE — 86704 HEP B CORE ANTIBODY TOTAL: CPT | Performed by: HOSPITALIST

## 2021-10-05 PROCEDURE — 250N000013 HC RX MED GY IP 250 OP 250 PS 637: Performed by: PHYSICIAN ASSISTANT

## 2021-10-05 PROCEDURE — 85027 COMPLETE CBC AUTOMATED: CPT | Performed by: PHYSICIAN ASSISTANT

## 2021-10-05 PROCEDURE — 86803 HEPATITIS C AB TEST: CPT | Performed by: HOSPITALIST

## 2021-10-05 PROCEDURE — 120N000001 HC R&B MED SURG/OB

## 2021-10-05 PROCEDURE — 250N000013 HC RX MED GY IP 250 OP 250 PS 637: Performed by: HOSPITALIST

## 2021-10-05 PROCEDURE — 85379 FIBRIN DEGRADATION QUANT: CPT | Performed by: PHYSICIAN ASSISTANT

## 2021-10-05 PROCEDURE — 80053 COMPREHEN METABOLIC PANEL: CPT | Performed by: INTERNAL MEDICINE

## 2021-10-05 PROCEDURE — 36415 COLL VENOUS BLD VENIPUNCTURE: CPT | Performed by: HOSPITALIST

## 2021-10-05 PROCEDURE — 84132 ASSAY OF SERUM POTASSIUM: CPT | Performed by: PHYSICIAN ASSISTANT

## 2021-10-05 PROCEDURE — 250N000013 HC RX MED GY IP 250 OP 250 PS 637: Performed by: INTERNAL MEDICINE

## 2021-10-05 PROCEDURE — 99233 SBSQ HOSP IP/OBS HIGH 50: CPT | Performed by: HOSPITALIST

## 2021-10-05 RX ORDER — IBUPROFEN 400 MG/1
400 TABLET, FILM COATED ORAL EVERY 6 HOURS PRN
Status: DISCONTINUED | OUTPATIENT
Start: 2021-10-05 | End: 2021-10-10 | Stop reason: HOSPADM

## 2021-10-05 RX ORDER — POTASSIUM CHLORIDE 1500 MG/1
40 TABLET, EXTENDED RELEASE ORAL ONCE
Status: DISCONTINUED | OUTPATIENT
Start: 2021-10-05 | End: 2021-10-10 | Stop reason: HOSPADM

## 2021-10-05 RX ORDER — FAMOTIDINE 20 MG/1
20 TABLET, FILM COATED ORAL 2 TIMES DAILY
Status: DISCONTINUED | OUTPATIENT
Start: 2021-10-05 | End: 2021-10-10 | Stop reason: HOSPADM

## 2021-10-05 RX ORDER — POTASSIUM CHLORIDE 1500 MG/1
40 TABLET, EXTENDED RELEASE ORAL ONCE
Status: COMPLETED | OUTPATIENT
Start: 2021-10-05 | End: 2021-10-05

## 2021-10-05 RX ADMIN — ACETAMINOPHEN 650 MG: 325 TABLET, FILM COATED ORAL at 06:47

## 2021-10-05 RX ADMIN — ACETAMINOPHEN 975 MG: 325 TABLET, FILM COATED ORAL at 15:14

## 2021-10-05 RX ADMIN — ENOXAPARIN SODIUM 40 MG: 100 INJECTION SUBCUTANEOUS at 23:12

## 2021-10-05 RX ADMIN — FAMOTIDINE 20 MG: 20 TABLET ORAL at 19:48

## 2021-10-05 RX ADMIN — GUAIFENESIN 10 ML: 100 SOLUTION ORAL at 19:48

## 2021-10-05 RX ADMIN — BENZONATATE 100 MG: 100 CAPSULE ORAL at 23:24

## 2021-10-05 RX ADMIN — BENZONATATE 100 MG: 100 CAPSULE ORAL at 06:47

## 2021-10-05 RX ADMIN — DEXAMETHASONE 6 MG: 2 TABLET ORAL at 12:22

## 2021-10-05 RX ADMIN — GUAIFENESIN 10 ML: 100 SOLUTION ORAL at 07:02

## 2021-10-05 RX ADMIN — ALBUTEROL SULFATE 6 PUFF: 90 AEROSOL, METERED RESPIRATORY (INHALATION) at 08:00

## 2021-10-05 RX ADMIN — POTASSIUM CHLORIDE 40 MEQ: 1500 TABLET, EXTENDED RELEASE ORAL at 07:56

## 2021-10-05 RX ADMIN — FAMOTIDINE 20 MG: 20 TABLET ORAL at 07:55

## 2021-10-05 RX ADMIN — ACETAMINOPHEN 975 MG: 325 TABLET, FILM COATED ORAL at 23:24

## 2021-10-05 RX ADMIN — ALBUTEROL SULFATE 6 PUFF: 90 AEROSOL, METERED RESPIRATORY (INHALATION) at 23:14

## 2021-10-05 RX ADMIN — IBUPROFEN 400 MG: 400 TABLET ORAL at 08:54

## 2021-10-05 RX ADMIN — ENOXAPARIN SODIUM 40 MG: 100 INJECTION SUBCUTANEOUS at 12:24

## 2021-10-05 RX ADMIN — TOCILIZUMAB 560 MG: 20 INJECTION, SOLUTION, CONCENTRATE INTRAVENOUS at 11:17

## 2021-10-05 ASSESSMENT — ACTIVITIES OF DAILY LIVING (ADL)
ADLS_ACUITY_SCORE: 6
ADLS_ACUITY_SCORE: 4
ADLS_ACUITY_SCORE: 6
ADLS_ACUITY_SCORE: 6
ADLS_ACUITY_SCORE: 4
ADLS_ACUITY_SCORE: 6

## 2021-10-05 NOTE — PROGRESS NOTES
[facility] Medicine Progress Note  Date of Service: 10/05/2021    Assessment & Plan               Percy Curtis is a 46 year old male admitted on 10/4/2021. Patient previously seen in the ER 9/28/2021 and diagnosed with COVID-19.  He was discharged home with doxycycline x5 days.  Returned to ER 10/4 due to oxygen saturations in the 80s at home.  CT with multifocal PNA but no PE. Escalating oxygen needs overnight have prompted initiation of high flow nasal cannula this morning.      # Confirmed COVID-19 infection    # Acute Hypoxic Respiratory Failure secondary to COVID-19 infection  # Viral Pneumonia secondary to COVID-19 infection     Symptom Onset 9/23/2021   Date of 1st Positive Test 9/28/2021   Vaccination Status Declines Vaccine       - continuous pulse ox, COVID-19 special precautions.  Acetaminophen/ibuprofen as needed fevers  - Oxygen: continue current support with HFNC at 45 L/min, 60%; titrate to keep SpO2 between 90-96%  - Labs: Daily Covid labs per protocol.  Notable so far for  ? 196,  ? 657, fibrinogen 696 ? 760, and Ddimer 0.88.  - Imaging: no additional imaging needed at this time  - Breathing treatments: no inhalers needed; avoid nebulizers in favor of MDIs   - IV fluids: not indicated at this time  - Antibiotics: not indicated   - COVID-Focused Medications: Dexamethasone 6 mg x 10 days or until hospital discharge, started on 10/4 and Toculizumab started on 10/5 after ID consult.  Received approval from ID today given escalating oxygen needs and elevated markers of severity high CRP and worsening LDH and fibrinogen. Patient declined remdesivir use due to concern for renal impairment in setting of known family history of polycytic kidney disease (although patient has not been diagnosed with PCKD personally).   - DVT Prophylaxis: at high risk of thrombotic complications due to COVID-19 (DDimer = 0.88 ug/mL FEU (Ref range: 0.00 - 0.50 ug/mL  FEU) ).          - LOW INTENSITY dosing: Lovenox 0.5 mg/kg two times a day as is on HFNC        - consider anticoag on discharge for 30 days & until return to normal mobility     Tocilizumab screening  -Check QuantiFERON, hep B and hep C and HIV status    Transaminitis  Admit , . Alkphos normal. Patient has been using high amounts of acetaminophen recently, but admit acetaminophen level is normal. Patient has not had any alcohol since his COVID diagnosis, but does drink a few beers per week typically. Suspect LFT elevation is due to COVID rather than acetaminophen or alcohol.  - trend intermittently here     Hypokalemia  Admit K = 3.1.  Likely due to poor oral intake.  Improving.  -Status post 40 mg this morning, will give a second dose of 40 later today and recheck tomorrow.    Diet: Combination Diet Regular Diet Adult    DVT Prophylaxis: Enoxaparin (Lovenox) SQ  Sharma Catheter: Not present  Central Lines: None  Code Status: Full Code           Discussion: on med surg he currently.  I am hopeful we can down titrate his oxygen with proning which e has initiated this morning.  I am optimistic we can titrate his oxygen down, we will need to consider transfer to ICU later today.  Discussed with charge nurse..    Disposition: Anticipate discharge ???     Attestation:  I have reviewed today's vital signs, notes, medications, labs and imaging.    Total time: 45 minutes with 30 minutes spent with coordination of care and counseling reviewing plan of care with patient and also discussed with his wife, also discussed case with infectious disease [facility] physician at the [facility].  Patient is significantly worsening over the past 24 hours.      Rene      Interval History   Patient feels about the same as he did yesterday.  Some ongoing fevers.  Remains hesitant about remdesivir but after discussion of risk and benefits of tocilizumab he was agreeable to this if approved.    Physical Exam    Temp:  [98  F (36.7  C)-101.1  F (38.4  C)] 101.1  F (38.4  C)  Pulse:  [] 112  Resp:  [16-22] 22  BP: (103-131)/() 114/75  FiO2 (%):  [64 %-70 %] 64 %  SpO2:  [87 %-94 %] 94 %    Weights:   Vitals:    10/04/21 1301 10/04/21 2124   Weight: 72.6 kg (160 lb) 76.3 kg (168 lb 3.4 oz)    Body mass index is 25.58 kg/m .    Constitutional: Fatigued appearing male who appears his stated age laying prone in bed  CV: Regular  Respiratory: CTA bilaterally  GI: Soft, nontender  Skin: Warm and dry  Musculoskeletal:    Data   Recent Labs   Lab 10/05/21  0639 10/05/21  0159 10/04/21  2154 10/04/21  1928 10/04/21  1536 10/04/21  1536   WBC 6.6  --   --   --   --  7.5   HGB 14.5  --   --   --   --  14.5   MCV 83  --   --   --   --  83     --   --   --   --  221   INR  --   --   --  1.06  --   --      --   --   --   --  135   POTASSIUM 3.4 3.5 3.2*  --    < > 3.1*   CHLORIDE 108  --   --   --   --  101   CO2 25  --   --   --   --  24   BUN 18  --   --   --   --  16   CR 0.77  --   --   --   --  0.99   ANIONGAP 7  --   --   --   --  10   KAIT 8.9  --   --   --   --  8.6   *  --   --   --   --  100*   ALBUMIN 2.4*  --   --   --   --  2.5*   PROTTOTAL 6.7*  --   --   --   --  7.2   BILITOTAL 0.4  --   --   --   --  0.5   ALKPHOS 75  --   --   --   --  73   *  --   --   --   --  140*   *  --   --   --   --  208*   LIPASE  --   --   --   --   --  227   TROPONIN <0.015  --   --   --   --  <0.015    < > = values in this interval not displayed.       Recent Labs   Lab 10/05/21  0639 10/04/21  1536   * 100*        Unresulted Labs Ordered in the Past 30 Days of this Admission     No orders found for last 31 day(s).           Imaging  Recent Results (from the past 24 hour(s))   CT Chest Pulmonary Embolism w Contrast    Narrative    EXAM: CT CHEST PULMONARY EMBOLISM W CONTRAST  LOCATION: [facility]  DATE/TIME: 10/4/2021 5:31 PM    INDICATION: PE suspected, high  prob --history of Covid, increasing shortness of breath  COMPARISON: None.  TECHNIQUE: CT chest pulmonary angiogram during arterial phase injection of IV contrast. Multiplanar reformats and MIP reconstructions were performed. Dose reduction techniques were used.   CONTRAST: 71 mL Isovue 370    FINDINGS:  ANGIOGRAM CHEST: No evidence for pulmonary embolism. Pulmonary arteries normal in caliber. Thoracic aorta normal in caliber. No aortic dissection or other acute abnormality.    HEART: Cardiac chambers within normal limits. No pericardial effusion. No coronary artery calcification.    LUNGS AND PLEURA: Severe multifocal groundglass density with scattered areas of denser consolidation throughout the lungs compatible with COVID 19 pneumonia. No pleural effusion or pneumothorax.    MEDIASTINUM: No adenopathy or mass. Small hiatal hernia.    LIMITED UPPER ABDOMEN: Negative.    MUSCULOSKELETAL: Negative.      Impression    IMPRESSION:  1.  No evidence for pulmonary embolism.   2.  Severe pneumonia, appearance compatible with COVID 19 pneumonia.        I reviewed all new labs and imaging results over the last 24 hours. I personally reviewed the chest CT image(s) showing Multifocal pneumonia.    Medications     - MEDICATION INSTRUCTIONS -         acetaminophen  975 mg Oral Q8H     dexamethasone  6 mg Oral Daily     enoxaparin ANTICOAGULANT  40 mg Subcutaneous Q24H     famotidine  20 mg Oral BID     sodium chloride (PF)  3 mL Intracatheter Q8H     tocilizumab  8 mg/kg Intravenous Once       Rene Roman MD

## 2021-10-05 NOTE — PROGRESS NOTES
HFNC set up.  Instructed patient on self prone position and ambulation in room as able.  Temp 101.1 post tylenol.  Page sent to Dr. Roman and requested ibuprofen.

## 2021-10-05 NOTE — PROVIDER NOTIFICATION
"0650 Notified Dr. Renan Marquez: \"Pt now requiring 11L oxymask to keep saturations >92%. May need high flow.\"     0700 Notified Dr. Renan Marquez: \"Pt now requiring 15L oxymask. RT has brought up the HFNC and will set it up once the orders are in. Thanks!\"  "

## 2021-10-05 NOTE — H&P
[facility]    History and Physical - Hospitalist Service       Date of Admission:  10/4/2021    Assessment & Plan      Percy Curtis is a 46 year old male admitted on 10/4/2021. He presented to the emergency department for evaluation of worsening hypoxia and shortness of breath in the setting of known COVID-19 infection and was found to have acute hypoxic respiratory failure for which he is being admitted for further evaluation and treatment.    Acute respiratory failure with hypoxia  Pneumonia due to 2019 novel coronavirus  Symptom onset: 9/23/21  Positive PCR test: 9/28/21  Vaccination status: unvaccinated    Known COVID exposure from his brother. Initially evaluated in [facility] emergency department on 9/28/21 and was COVID positive, also concern for possible bacterial pneumonia and doxycycline prescribed x 5 days. Discharged home with supportive cares and pulse ox.     O2 sats at home initially OK, but dropped to high 80's starting 10/4/2021. Returned to [facility] emergency department with acute hypoxic respiratory failure requiring 3L supplemental O2 on admission.     CT PE was negative for PE, but shows extensive bilateral groundglass opacities consistent with COVID-19 infection.     COVID labs:  LFTs - elevated (see below)  CRP - 214  LDH - 635  Troponin - <0.025  INR - 1.06  Fibrinogen - 696  D-dimer - < 0.27    - Admit to medical floor with continuous pulse ox, titrate supplemental O2 to keep SpO2 between 90-96%  - Isolation: airborne, contact  - Dexamethasone 6 mg initiated in the emergency department 10/4/2021, continue daily x 10 days  - Patient declines remdesivir use due to concern for renal impairment in setting of known family history of polycytic kidney disease (although patient has not been diagnosed with PCKD personally)  - VTE prophylaxis: PE ruled out on CT, D-dimer <0.27 - Lovenox 40 mg daily  - Prn albuterol available  - Antitussive and antiemetic  "medications available prn   - IV fluids and antibiotics not indicated  - COVID labs daily per order set  - Consider anticoagulation on discharge for 30 days & until return to normal mobility    LFT elevation  Admit , . Alkphos normal. Patient has been using high amounts of acetaminophen recently, but admit acetaminophen level is normal. Patient has not had any alcohol since his COVID diagnosis, but does drink a few beers per week typically. Suspect LFT elevation is due to COVID rather than acetaminophen or alcohol.  - Daily CMP    Hypokalemia  Admit K = 3.1.   - K protocol         Diet: Combination Diet Regular Diet Adult    DVT Prophylaxis: Enoxaparin (Lovenox) SQ  Sharma Catheter: Not present  Central Lines: None  Code Status: Full Code  - discussed with patient at time of admission       Disposition Plan   Expected discharge: 2 + days recommended to prior living arrangement once improvement in respiratory status and weaned from supplemental O2 vs discharged home on home oxygen, clinically improving.     The patient's care was discussed with the Attending Physician, Dr. Renan Marquez and Patient.    Lianna Larose PA-C  [facility]  Securely message with the Vocera Web Console (learn more here)  Text page via [facility] Paging/Directory      ______________________________________________________________________    Chief Complaint   \"I have COVID and today I couldn't get my O2 sats above 90.\"    History is obtained from the patient, review of EMR, and emergency department sign out from Dr. Jeramie Sheikh.    History of Present Illness   Percy Curtis is a 46 year old male who presented to the emergency department with hypoxia on home pulse ox in the setting of known COVID.    Patient was exposed to SARS-CoV-2 by his brother a little over 2 weeks ago.  He started having symptoms about 12 days ago around 9/23/21.  He had a syncopal episode at home on 9/28 while on the toilet, " "after which he went to the [facility] emergency department.   He was diagnosed with COVID-19 infection and there was also concern for possible bacterial pneumonia as well, so he was prescribed doxycycline x 5 days.  He was discharged home with a pulse ox and told to return if sats were low.    He has continued to feel poorly since his prior emergency department visit.  He has been taking scheduled but alternating acetaminophen and ibuprofen qid.   Initially his O2 sats would sometimes drop below 90 but he would recover, although today he was persistently below 90 no matter what he did, so he returned to the emergency department.  He was found to have acute hypoxic respiratory failure due to COVID infection and is being admitted for ongoing cares.    Symptoms include a dry cough and persistent shortness of breath even at rest, along with slight wheezing.   Pain includes rib pain and abdominal pain associated with coughing, generalized myalgias, and headaches.  He has extreme fatigue, sleeping 18-20 hours per day but a little more energy the past 2 days.  Taste sensation is altered but he is able to taste things although he has a poor appetite, dry mouth and is not able to eat much.   Has alternating fevers and chills.  Some loose, watery diarrhea but no nausea, vomiting, or constipation.  No rashes, but some hand clamminess described as \"chalky feeling skin.\"  No rhinorrhea / nasal congestion, no sore throat, confusion, or slurred speech.  No confusion / slurred speech.  Has some balance issues, mostly when febrile.     He has not been vaccinated for COVID.  No current or former tobacco use.  He is healthy at baseline, no history of asthma, COPD, or other underlying respiratory problems.   He has been using scheduled but alternating acetaminophen (1g q 6 hours) and ibuprofen (400 mg q 6 hours).       Review of Systems    The 10 point Review of Systems is negative other than noted in the HPI or here.     Past " Medical History    I have reviewed this patient's medical history and updated it with pertinent information if needed.   No past medical history on file.    Past Surgical History   I have reviewed this patient's surgical history and updated it with pertinent information if needed.  Past Surgical History:   Procedure Laterality Date     TONSILLECTOMY         Social History   I have reviewed this patient's social history and updated it with pertinent information if needed.  Social History     Tobacco Use     Smoking status: Never Smoker     Smokeless tobacco: Never Used   Substance Use Topics     Alcohol use: Yes     Comment: A few drinks per week, mostly beer;     Drug use: None       Family History   I have reviewed this patient's family history and updated it with pertinent information if needed.  Family History   Problem Relation Age of Onset     Heart Disease Mother      Heart Disease Father      Aneurysm Father      Polycystic Kidney Diease Father      LUNG DISEASE Brother      Polycystic Kidney Diease Brother          Prior to Admission Medications   Prior to Admission Medications   Prescriptions Last Dose Informant Patient Reported? Taking?   OVER-THE-COUNTER 10/4/2021 at 0900 Self Yes Yes   Sig: Take 2 tablets by mouth daily POTASSIUM   Phenylephrine-APAP-guaiFENesin (MUCINEX FAST-MAX) -400 MG/20ML LIQD 10/4/2021 at 0900 Self Yes Yes   Sig: Take 20 mLs by mouth every 4 hours as needed MUCINEX   acetaminophen (TYLENOL) 500 MG tablet 10/4/2021 at 0900 Self Yes Yes   Sig: Take 1,000 mg by mouth every 6 hours as needed for mild pain   doxycycline monohydrate (MONODOX) 100 MG capsule   No No   Sig: Take 1 capsule (100 mg) by mouth 2 times daily for 5 days   ibuprofen (ADVIL/MOTRIN) 200 MG tablet 10/4/2021 at 1200 Self Yes Yes   Sig: Take 400 mg by mouth every 4 hours as needed for mild pain      Facility-Administered Medications: None     Allergies   Allergies   Allergen Reactions     Food      watermelon,  almonds, walnuts.     Penicillins Hives and Rash     Lumps on tongue       Physical Exam   Vital Signs: Temp: 98.4  F (36.9  C) Temp src: Oral BP: 113/75 Pulse: 93   Resp: 18 SpO2: 91 % O2 Device: Nasal cannula Oxygen Delivery: 3 LPM  Weight: 168 lbs 3.38 oz    Constitutional: Alert, oriented, cooperative. No apparent distress. Ill appearing but nontoxic, speaking in full sentences.    Eyes: Eyes are clear, pupils are reactive. No scleral icterus.    HEENT: Oropharynx is clear and moist, no lesions. Normocephalic, no evidence of cranial trauma.      Cardiovascular: Regular rhythm and rate, normal S1 and S2. No murmur, rubs, or gallops. Peripheral pulses intact bilaterally. No lower extremity edema.    Respiratory: Non-labored breathing on 2L supplemental O2. Occasional expiratory wheezing noted. Fine bibasilar crackles present, no rhonchi.     GI: Soft, non-distended. Non-tender, no rebound or guarding. No hepatosplenomegaly or masses appreciated. Normal bowel sounds.     Musculoskeletal: Without obvious deformity, normal range of motion. Normal muscle bulk and tone. Distal CMS intact.      Skin: Warm and dry, no rashes or ecchymoses. No mottling of skin.      Neurologic: Patient moves all extremities. Gross strength and sensation are equal bilaterally.    Genitourinary: Deferred      Data   Data reviewed today: I reviewed all medications, new labs and imaging results over the last 24 hours. I personally reviewed the chest CT image(s) showing extensive bilateral groundglass opacities consistent with COVID-19 infection.    Recent Labs   Lab 10/04/21  2154 10/04/21  1928 10/04/21  1536 09/28/21  0514   WBC  --   --  7.5 2.8*   HGB  --   --  14.5 14.8   MCV  --   --  83 82   PLT  --   --  221 160   INR  --  1.06  --   --    NA  --   --  135 137   POTASSIUM 3.2*  --  3.1* 3.2*   CHLORIDE  --   --  101 107   CO2  --   --  24 24   BUN  --   --  16 14   CR  --   --  0.99 1.10   ANIONGAP  --   --  10 6   KAIT  --   --  8.6  7.8*   GLC  --   --  100* 153*   ALBUMIN  --   --  2.5* 3.2*   PROTTOTAL  --   --  7.2 6.6*   BILITOTAL  --   --  0.5 0.2   ALKPHOS  --   --  73 50   ALT  --   --  140* 29   AST  --   --  208* 28   LIPASE  --   --  227  --    TROPONIN  --   --  <0.015  --      Recent Results (from the past 24 hour(s))   CT Chest Pulmonary Embolism w Contrast    Narrative    EXAM: CT CHEST PULMONARY EMBOLISM W CONTRAST  LOCATION: [facility]  DATE/TIME: 10/4/2021 5:31 PM    INDICATION: PE suspected, high prob --history of Covid, increasing shortness of breath  COMPARISON: None.  TECHNIQUE: CT chest pulmonary angiogram during arterial phase injection of IV contrast. Multiplanar reformats and MIP reconstructions were performed. Dose reduction techniques were used.   CONTRAST: 71 mL Isovue 370    FINDINGS:  ANGIOGRAM CHEST: No evidence for pulmonary embolism. Pulmonary arteries normal in caliber. Thoracic aorta normal in caliber. No aortic dissection or other acute abnormality.    HEART: Cardiac chambers within normal limits. No pericardial effusion. No coronary artery calcification.    LUNGS AND PLEURA: Severe multifocal groundglass density with scattered areas of denser consolidation throughout the lungs compatible with COVID 19 pneumonia. No pleural effusion or pneumothorax.    MEDIASTINUM: No adenopathy or mass. Small hiatal hernia.    LIMITED UPPER ABDOMEN: Negative.    MUSCULOSKELETAL: Negative.      Impression    IMPRESSION:  1.  No evidence for pulmonary embolism.   2.  Severe pneumonia, appearance compatible with COVID 19 pneumonia.

## 2021-10-05 NOTE — PLAN OF CARE
A&O X4. Independent in room, calls appropriately. 5L NC to w/ saturations 92-94%. LS diminished w/ fine crackles. Nonproductive cough. Reports loose stools at home. On K protocol. Utilizing tessalon for cough. Using urinal at bedside.    0645: Following coughing fit this morning, pt desaturated to 85%, requiring 15 LPM oxymask (was previously on 5 LPM NC). RT called. Hospitalist paged. Tessalon and Robitussin administered for cough. Currently 91% on 15 LPM oxymask.

## 2021-10-05 NOTE — PROGRESS NOTES
HFNC decreased to 55% and 45 L. IS exercises reviewed and demonstrated. All questions answered at this time.

## 2021-10-05 NOTE — PROGRESS NOTES
"[facility] ADMISSION NOTE    Patient admitted to room 2314 at approximately 2230 via cart from emergency room. Patient was accompanied by transport tech.     Verbal SBAR report received from Claire MARTINEZ prior to patient arrival.     Patient ambulated to bed independently. Patient alert and oriented X 3. The patient is not having any pain.  . Admission vital signs: Blood pressure 104/73, pulse 89, temperature 98  F (36.7  C), temperature source Oral, resp. rate 16, height 1.727 m (5' 8\"), weight 76.3 kg (168 lb 3.4 oz), SpO2 94 %. Patient were oriented to plan of care, call light, bed controls, tv, telephone, bathroom and visiting hours.     Risk Assessment    The following safety risks were identified during admission: fall and skin. Yellow risk band applied: YES.     Skin Initial Assessment    This writer admitted this patient and completed a full skin assessment and Ramin score in the Adult PCS flowsheet. Appropriate interventions initiated as needed.     Secondary skin check completed by Pt declined.         Education    Patient has a [place] to Observation order: No  Observation education completed and documented: No      Ai Aguila RN    "

## 2021-10-05 NOTE — PROGRESS NOTES
"CLINICAL NUTRITION SERVICES - ASSESSMENT NOTE     Nutrition Prescription    RECOMMENDATIONS FOR MDs/PROVIDERS TO ORDER:  None    Malnutrition Status:    Unable to determine due to no physical assessment or weight history.    Recommendations already ordered by Registered Dietitian (RD):  -Nerissa Doone cookies at 10am.  -Raspberry or orange fruit ice at 2pm.  -Applesauce at HS.  -Pears w/ meals as able.  -Daily weights.    Future/Additional Recommendations:  -Continue to monitor and encourage oral intake.  -Add/adjust snacks/supplements as tolerated.     REASON FOR ASSESSMENT  Percy Curtis is a/an 46 year old male assessed by the dietitian for Admission Nutrition Risk Screen for weight loss and poor oral intake.     NUTRITION HISTORY  Obtained from electronic medical record:  -Per H&P, \"admitted on 10/4/2021. He presented to the emergency department for evaluation of worsening hypoxia and shortness of breath in the setting of known COVID-19 infection and was found to have acute hypoxic respiratory failure. CT PE was negative for PE, but shows extensive bilateral groundglass opacities consistent with COVID-19 infection.\" Symptom onset: 9/23/21. Positive PCR test: 9/28/21. He presented to the emergency department on 9/28/21, diagnosed w/ COVID-19 and possible bacterial pneumonia but discharged home on doxycyline x 5 days.  -Otherwise healthy at baseline.  -Per H&P, \"Taste sensation is altered but he is able to taste things although he has a poor appetite, dry mouth and is not able to eat much.\" Some loose stools at home.  -O2: patient now requiring HFNC d/t increased needs on oxymask.    Spoke to patient via telephone:  -Poor appetite for 2 weeks. He's been eating oatmeal, cantaloupe, pineapple, and nothing too salty. He's been eating 50% of his normal intake.  -Normal intake is 1 meal/day but the meal is \"pretty big\" and eats a light snack such as pistachios. Coffee in the morning and a couple of drinks " "(unclear if this is alcohol or not) throughout the day, and then dinner.  -food allergies to watermelon, almonds and walnuts: can eat them but prefers to avoid.    CURRENT NUTRITION ORDERS  Diet: Orders Placed This Encounter      Combination Diet Regular Diet Adult      Intake/Tolerance:  -Bites of breakfast per flowsheets. Noted to have nutrition Ramin scores of 2 in the flowsheets.  -Patient reports he ate maybe half the eggs this morning. His toast was a little cold and didn't eat much of the gibson. Appetite is better than it has been.    LABS  K+ 3.4 (WNL) - up from admit 3.1 (L) following replacement.   (H)   (H)  CRP inflammation 196.0 (H)  Lactate Dehydrogenase 657 (H)   (H)    MEDICATIONS  Dexamethasone  Famotidine  K+ replacement protocol    ANTHROPOMETRICS  Height: 172.7 cm (5' 8\")  Most Recent Weight: 76.3 kg (168 lb 3.4 oz)    IBW: 70 kg (109% IBW)  BMI: 25.58 kg/m2 Overweight BMI 25-29.9  Weight History:   Wt Readings from Last 10 Encounters:   10/04/21 76.3 kg (168 lb 3.4 oz)   09/28/21 72.6 kg (160 lb)   Per care everywhere:  70.3 kg (155 lb) 03/24/2021 2:29 PM CDT    Due to limited weight history, unable to determine if patient has lost weight and if it meets criteria for malnutrition.    Patient doesn't feel like he's lost much weight. His UBW is 165-170 lb. He doesn't have a scale at home.      Current encounter:  Vitals:    10/04/21 1301 10/04/21 2124   Weight: 72.6 kg (160 lb) 76.3 kg (168 lb 3.4 oz)       Dosing Weight: 76 kg (current weight)    ASSESSED NUTRITION NEEDS  Estimated Energy Needs: 2239-4958 kcals/day (25 - 30 kcals/kg)  Justification: Maintenance  Estimated Protein Needs: 76-91 grams protein/day (1 - 1.2 grams of pro/kg)  Justification: Maintenance  Estimated Fluid Needs: (1 mL/kcal)   Justification: Per provider pending fluid status    PHYSICAL FINDINGS  Unable to assess due to isolation precautions secondary to COVID-19 infection. RDs not entering rooms to " preserve PPE and minimize exposure, per nutrition department guidelines.    MALNUTRITION  % Intake: </= 50% for >/= 5 days (severe)  % Weight Loss: Unable to assess  Subcutaneous Fat Loss: Unable to assess  Muscle Loss: Unable to assess  Fluid Accumulation/Edema: None noted  Malnutrition Diagnosis: Unable to determine due to no physical assessment or weight history.    NUTRITION DIAGNOSIS  Inadequate oral intake related to poor appetite secondary to acute illness as evidenced by patient report of eating 50% of his normal intake.      INTERVENTIONS  Implementation  Nutrition Education: Provided education on role of RD in nutrition plan of care. Discussed available snacks and supplements.     Collaboration with other providers: patient plan of care discussed during IDT rounds this morning.      Modify composition of meals/snacks    Daily weights.     Goals  Patient to consume % of nutritionally adequate meal trays TID, or the equivalent with supplements/snacks.     Monitoring/Evaluation  Progress toward goals will be monitored and evaluated per protocol.    Glo Carrasco RDN, DIVINA  Clinical Dietitian  Office (Monday-Friday): [phone]  Weekend/Holiday Pager: [phone]

## 2021-10-06 LAB
ALBUMIN SERPL-MCNC: 2.2 G/DL (ref 3.4–5)
ALP SERPL-CCNC: 71 U/L (ref 40–150)
ALT SERPL W P-5'-P-CCNC: 137 U/L (ref 0–70)
ANION GAP SERPL CALCULATED.3IONS-SCNC: 6 MMOL/L (ref 3–14)
AST SERPL W P-5'-P-CCNC: 123 U/L (ref 0–45)
BILIRUB SERPL-MCNC: 0.3 MG/DL (ref 0.2–1.3)
BUN SERPL-MCNC: 22 MG/DL (ref 7–30)
CALCIUM SERPL-MCNC: 8.5 MG/DL (ref 8.5–10.1)
CHLORIDE BLD-SCNC: 110 MMOL/L (ref 94–109)
CO2 SERPL-SCNC: 25 MMOL/L (ref 20–32)
CREAT SERPL-MCNC: 0.74 MG/DL (ref 0.66–1.25)
CRP SERPL-MCNC: 78.2 MG/L (ref 0–8)
D DIMER PPP FEU-MCNC: 0.79 UG/ML FEU (ref 0–0.5)
ERYTHROCYTE [DISTWIDTH] IN BLOOD BY AUTOMATED COUNT: 12.3 % (ref 10–15)
FIBRINOGEN PPP-MCNC: 686 MG/DL (ref 170–490)
GFR SERPL CREATININE-BSD FRML MDRD: >90 ML/MIN/1.73M2
GLUCOSE BLD-MCNC: 143 MG/DL (ref 70–99)
HBV CORE AB SERPL QL IA: NONREACTIVE
HBV SURFACE AG SERPL QL IA: NONREACTIVE
HCT VFR BLD AUTO: 39 % (ref 40–53)
HGB BLD-MCNC: 13.6 G/DL (ref 13.3–17.7)
HIV 1+2 AB+HIV1 P24 AG SERPL QL IA: NONREACTIVE
LDH SERPL L TO P-CCNC: 541 U/L (ref 85–227)
MCH RBC QN AUTO: 29.5 PG (ref 26.5–33)
MCHC RBC AUTO-ENTMCNC: 34.9 G/DL (ref 31.5–36.5)
MCV RBC AUTO: 85 FL (ref 78–100)
PLATELET # BLD AUTO: 274 10E3/UL (ref 150–450)
POTASSIUM BLD-SCNC: 3.6 MMOL/L (ref 3.4–5.3)
PROT SERPL-MCNC: 6.4 G/DL (ref 6.8–8.8)
RBC # BLD AUTO: 4.61 10E6/UL (ref 4.4–5.9)
SODIUM SERPL-SCNC: 141 MMOL/L (ref 133–144)
WBC # BLD AUTO: 5.6 10E3/UL (ref 4–11)

## 2021-10-06 PROCEDURE — 120N000001 HC R&B MED SURG/OB

## 2021-10-06 PROCEDURE — 82040 ASSAY OF SERUM ALBUMIN: CPT | Performed by: PHYSICIAN ASSISTANT

## 2021-10-06 PROCEDURE — 83615 LACTATE (LD) (LDH) ENZYME: CPT | Performed by: PHYSICIAN ASSISTANT

## 2021-10-06 PROCEDURE — 85384 FIBRINOGEN ACTIVITY: CPT | Performed by: PHYSICIAN ASSISTANT

## 2021-10-06 PROCEDURE — 85379 FIBRIN DEGRADATION QUANT: CPT | Performed by: PHYSICIAN ASSISTANT

## 2021-10-06 PROCEDURE — 250N000011 HC RX IP 250 OP 636: Performed by: HOSPITALIST

## 2021-10-06 PROCEDURE — 250N000013 HC RX MED GY IP 250 OP 250 PS 637: Performed by: PHYSICIAN ASSISTANT

## 2021-10-06 PROCEDURE — 86140 C-REACTIVE PROTEIN: CPT | Performed by: PHYSICIAN ASSISTANT

## 2021-10-06 PROCEDURE — 85027 COMPLETE CBC AUTOMATED: CPT | Performed by: PHYSICIAN ASSISTANT

## 2021-10-06 PROCEDURE — 86481 TB AG RESPONSE T-CELL SUSP: CPT | Performed by: HOSPITALIST

## 2021-10-06 PROCEDURE — 250N000012 HC RX MED GY IP 250 OP 636 PS 637: Performed by: PHYSICIAN ASSISTANT

## 2021-10-06 PROCEDURE — 99232 SBSQ HOSP IP/OBS MODERATE 35: CPT | Performed by: INTERNAL MEDICINE

## 2021-10-06 PROCEDURE — 250N000013 HC RX MED GY IP 250 OP 250 PS 637: Performed by: INTERNAL MEDICINE

## 2021-10-06 PROCEDURE — 36415 COLL VENOUS BLD VENIPUNCTURE: CPT | Performed by: PHYSICIAN ASSISTANT

## 2021-10-06 RX ADMIN — FAMOTIDINE 20 MG: 20 TABLET ORAL at 10:55

## 2021-10-06 RX ADMIN — ACETAMINOPHEN 975 MG: 325 TABLET, FILM COATED ORAL at 16:53

## 2021-10-06 RX ADMIN — DEXAMETHASONE 6 MG: 2 TABLET ORAL at 12:58

## 2021-10-06 RX ADMIN — GUAIFENESIN 10 ML: 100 SOLUTION ORAL at 12:58

## 2021-10-06 RX ADMIN — GUAIFENESIN 10 ML: 100 SOLUTION ORAL at 21:51

## 2021-10-06 RX ADMIN — ENOXAPARIN SODIUM 40 MG: 100 INJECTION SUBCUTANEOUS at 10:55

## 2021-10-06 RX ADMIN — ACETAMINOPHEN 975 MG: 325 TABLET, FILM COATED ORAL at 10:54

## 2021-10-06 RX ADMIN — FAMOTIDINE 20 MG: 20 TABLET ORAL at 21:51

## 2021-10-06 RX ADMIN — BENZONATATE 100 MG: 100 CAPSULE ORAL at 21:51

## 2021-10-06 ASSESSMENT — ACTIVITIES OF DAILY LIVING (ADL)
ADLS_ACUITY_SCORE: 6

## 2021-10-06 NOTE — PLAN OF CARE
Patient alert, oriented.   States overall feels better, afebrile so far today.  Oxygen sats 90-95% on HFNC at 40L and 51%  Oxygen sats decrease to 87-90% during cough spells but rebound within a few minutes. Robitussin prn given for productive cough.    Bowel movement today, soft- patient states diarrhea resolving   Appetite fair, many choices offered and patient lets dietary preferences known.  Good liquid intake and voiding good amounts.

## 2021-10-06 NOTE — PLAN OF CARE
Oxygen titrated per respiratory therapy this evening and patient doing well.  Currently saturations 94 % on HFNC 55% and 45 L.  Education handouts provided on prone position and COVID.  He reports this evening that he is feeling the best he has in awhile.  Has been afebrile since this morning's fever and he is also on scheduled tylenol.  He denies pain.  Has been standing in room and stepping in place and trying to ambulate as much as his equipment will allow.  Received dose of tocilizumab.  He reports his cough has decreased and no large sputum production.  Still has some diarrhea and had one loose stool today.

## 2021-10-06 NOTE — PROGRESS NOTES
[facility]    Hospitalist Progress Note    Date of Service (when I saw the patient): 10/06/2021    Assessment & Plan   Percy Curtis is a 46 year old male admitted on 10/4/2021. Patient previously seen in the ER 9/28/2021 and diagnosed with COVID-19.  He was discharged home with doxycycline x5 days.  Returned to ER 10/4 due to oxygen saturations in the 80s at home.  CT with multifocal PNA but no PE. Escalating oxygen needs overnight have prompted initiation of high flow nasal cannula this morning.     Confirmed COVID-19 infection    Acute Hypoxic Respiratory Failure secondary to COVID-19 infection  Viral Pneumonia secondary to COVID-19 infection     Symptom Onset 9/23/2021   Date of 1st Positive Test 9/28/2021   Vaccination Status Declines Vaccine         - Continuous pulse ox, COVID-19 special precautions.  Acetaminophen/ibuprofen as needed fevers  - Oxygen: continue current support with HFNC at 40 L/min, 50%; titrate to keep SpO2 between 90-96%  - Labs: Daily Covid labs per protocol.  Notable so far for  ? 196  ? 78,  ? 657  ? 541, fibrinogen 696 ? 760  ? 686, and Ddimer 0.88  ? 0.79.  - Imaging: no additional imaging needed at this time  - Breathing treatments: no inhalers needed; avoid nebulizers in favor of MDIs   - IV fluids: not indicated at this time  - Antibiotics: not indicated   - COVID-Focused Medications: Dexamethasone 6 mg x 10 days or until hospital discharge, started on 10/4 and Toculizumab started on 10/5 after ID consult.  Received approval from ID today given escalating oxygen needs and elevated markers of severity high CRP and worsening LDH and fibrinogen. Patient declined remdesivir use due to concern for renal impairment in setting of known family history of polycytic kidney disease (although patient has not been diagnosed with PCKD personally).   - DVT Prophylaxis: at high risk of thrombotic complications due to COVID-19 (DDimer = 0.88 ug/mL  FEU (Ref range: 0.00 - 0.50 ug/mL FEU) ).          - LOW INTENSITY dosing: Lovenox 0.5 mg/kg two times a day as is on HFNC        - consider anticoag on discharge for 30 days & until return to normal mobility     Tocilizumab screening  -Check QuantiFERON pending,   -Hep B and hep C and HIV negative     Transaminitis  Admit , . Alkphos normal. Patient has been using high amounts of acetaminophen recently, but admit acetaminophen level is normal. Patient has not had any alcohol since his COVID diagnosis, but does drink a few beers per week typically. Suspect LFT elevation is due to COVID rather than acetaminophen or alcohol.  - ,   - trend intermittently here     Hypokalemia  Admit K = 3.1.  Likely due to poor oral intake.  Improving.  -Status post 40 mg on 10/5, will give a second dose of 40 later and recheck tomorrow.  -K is 3.6 today     Diet: Combination Diet Regular Diet Adult    DVT Prophylaxis: Enoxaparin (Lovenox) SQ  Sharma Catheter: Not present  Central Lines: None  Code Status: Full Code      Disposition: Expected discharge in 3-5 days pending improved oxygen demands.    Gaetano Martin    Interval History   The patient is resting in bed.  He complains of frequent cough.  Appetite is modest.  No vomiting or diarrhea.    -Data reviewed today: I reviewed all new labs and imaging results over the last 24 hours. I personally reviewed no images or EKG's today.    Physical Exam   Temp: 97.5  F (36.4  C) Temp src: Oral BP: 124/82 Pulse: 88   Resp: 18 SpO2: 91 % O2 Device: High Flow Nasal Cannula (HFNC) Oxygen Delivery: 40 LPM  Vitals:    10/04/21 1301 10/04/21 2124   Weight: 72.6 kg (160 lb) 76.3 kg (168 lb 3.4 oz)     Vital Signs with Ranges  Temp:  [97.5  F (36.4  C)-98.2  F (36.8  C)] 97.5  F (36.4  C)  Pulse:  [82-88] 88  Resp:  [18-22] 18  BP: (112-124)/(71-95) 124/82  FiO2 (%):  [50 %-55 %] 52 %  SpO2:  [90 %-95 %] 91 %  I/O last 3 completed shifts:  In: 820 [P.O.:820]  Out: 450  [Urine:450]    Gen: Well nourished, well developed, alert and oriented x 3, no acute distressed  HEENT: Atraumatic, normocephalic; sclera non-injected, anicterric; oral mucosa moist, no lesion, no exudate  Lungs: Bibasilar rales, no wheezes, no rhonchi  Heart: Regular rate, regular rhythm, no gallops, no rubs, no murmurs  GI: Bowel sound normal, no hepatosplenomegaly, no masses, non-tender, non-distended, no guarding, no rebound tenderness  Lymph: No lymphadenopathy, no edema  Skin: No rashes, no chronic venous stasis     Medications     - MEDICATION INSTRUCTIONS -         acetaminophen  975 mg Oral Q8H     dexamethasone  6 mg Oral Daily     enoxaparin ANTICOAGULANT  40 mg Subcutaneous Q12H     famotidine  20 mg Oral BID     potassium chloride  40 mEq Oral Once     sodium chloride (PF)  3 mL Intracatheter Q8H       Data   Recent Labs   Lab 10/06/21  0515 10/05/21  0639 10/05/21  0159 10/04/21  2154 10/04/21  1928 10/04/21  1536 10/04/21  1536   WBC 5.6 6.6  --   --   --   --  7.5   HGB 13.6 14.5  --   --   --   --  14.5   MCV 85 83  --   --   --   --  83    253  --   --   --   --  221   INR  --   --   --   --  1.06  --   --     140  --   --   --   --  135   POTASSIUM 3.6 3.4 3.5   < >  --   --  3.1*   CHLORIDE 110* 108  --   --   --   --  101   CO2 25 25  --   --   --   --  24   BUN 22 18  --   --   --   --  16   CR 0.74 0.77  --   --   --   --  0.99   ANIONGAP 6 7  --   --   --   --  10   KAIT 8.5 8.9  --   --   --   --  8.6   * 138*  --   --   --   --  100*   ALBUMIN 2.2* 2.4*  --   --   --    < > 2.5*   PROTTOTAL 6.4* 6.7*  --   --   --    < > 7.2   BILITOTAL 0.3 0.4  --   --   --    < > 0.5   ALKPHOS 71 75  --   --   --    < > 73   * 141*  --   --   --    < > 140*   * 188*  --   --   --    < > 208*   LIPASE  --   --   --   --   --   --  227   TROPONIN  --  <0.015  --   --   --   --  <0.015    < > = values in this interval not displayed.       No results found for this or any  previous visit (from the past 24 hour(s)).

## 2021-10-06 NOTE — PLAN OF CARE
A&O X4. Afebrile. Independent in the room, using BC and urinal. Pt proned and side laying as tolerated.     Saturations 92-95% on HFNC 40 LPM 52% FiO2. Desaturates to 88-89% with activity/coughing but quickly recovers. 1 loose BM. Coughing up thick clear/white sputum. LS clear/diminished.     Robitussin X1 & tessalon X1 for coughing spells. Albuterol given X1 for wheezing and SOB.

## 2021-10-07 LAB
ALBUMIN SERPL-MCNC: 2.4 G/DL (ref 3.4–5)
ALP SERPL-CCNC: 69 U/L (ref 40–150)
ALT SERPL W P-5'-P-CCNC: 117 U/L (ref 0–70)
ANION GAP SERPL CALCULATED.3IONS-SCNC: 6 MMOL/L (ref 3–14)
AST SERPL W P-5'-P-CCNC: 67 U/L (ref 0–45)
BILIRUB SERPL-MCNC: 0.4 MG/DL (ref 0.2–1.3)
BUN SERPL-MCNC: 27 MG/DL (ref 7–30)
CALCIUM SERPL-MCNC: 8.6 MG/DL (ref 8.5–10.1)
CHLORIDE BLD-SCNC: 108 MMOL/L (ref 94–109)
CO2 SERPL-SCNC: 27 MMOL/L (ref 20–32)
CREAT SERPL-MCNC: 0.8 MG/DL (ref 0.66–1.25)
CRP SERPL-MCNC: 31.1 MG/L (ref 0–8)
D DIMER PPP FEU-MCNC: 0.66 UG/ML FEU (ref 0–0.5)
ERYTHROCYTE [DISTWIDTH] IN BLOOD BY AUTOMATED COUNT: 12.2 % (ref 10–15)
FIBRINOGEN PPP-MCNC: 642 MG/DL (ref 170–490)
GAMMA INTERFERON BACKGROUND BLD IA-ACNC: 0.14 IU/ML
GFR SERPL CREATININE-BSD FRML MDRD: >90 ML/MIN/1.73M2
GLUCOSE BLD-MCNC: 125 MG/DL (ref 70–99)
HCT VFR BLD AUTO: 42.1 % (ref 40–53)
HGB BLD-MCNC: 14.3 G/DL (ref 13.3–17.7)
LDH SERPL L TO P-CCNC: 430 U/L (ref 85–227)
M TB IFN-G BLD-IMP: ABNORMAL
M TB IFN-G CD4+ BCKGRND COR BLD-ACNC: 0.03 IU/ML
MCH RBC QN AUTO: 29.3 PG (ref 26.5–33)
MCHC RBC AUTO-ENTMCNC: 34 G/DL (ref 31.5–36.5)
MCV RBC AUTO: 86 FL (ref 78–100)
MITOGEN IGNF BCKGRD COR BLD-ACNC: -0.01 IU/ML
MITOGEN IGNF BCKGRD COR BLD-ACNC: 0 IU/ML
PLATELET # BLD AUTO: 325 10E3/UL (ref 150–450)
POTASSIUM BLD-SCNC: 3.8 MMOL/L (ref 3.4–5.3)
PROT SERPL-MCNC: 6.4 G/DL (ref 6.8–8.8)
QUANTIFERON MITOGEN: 0.17 IU/ML
QUANTIFERON NIL TUBE: 0.14 IU/ML
QUANTIFERON TB1 TUBE: 0.13 IU/ML
QUANTIFERON TB2 TUBE: 0.14
RBC # BLD AUTO: 4.88 10E6/UL (ref 4.4–5.9)
SODIUM SERPL-SCNC: 141 MMOL/L (ref 133–144)
TROPONIN I SERPL-MCNC: <0.015 UG/L (ref 0–0.04)
WBC # BLD AUTO: 4.9 10E3/UL (ref 4–11)

## 2021-10-07 PROCEDURE — 84484 ASSAY OF TROPONIN QUANT: CPT | Performed by: PHYSICIAN ASSISTANT

## 2021-10-07 PROCEDURE — 250N000013 HC RX MED GY IP 250 OP 250 PS 637: Performed by: INTERNAL MEDICINE

## 2021-10-07 PROCEDURE — 85027 COMPLETE CBC AUTOMATED: CPT | Performed by: PHYSICIAN ASSISTANT

## 2021-10-07 PROCEDURE — 85379 FIBRIN DEGRADATION QUANT: CPT | Performed by: PHYSICIAN ASSISTANT

## 2021-10-07 PROCEDURE — 250N000013 HC RX MED GY IP 250 OP 250 PS 637: Performed by: PHYSICIAN ASSISTANT

## 2021-10-07 PROCEDURE — 83615 LACTATE (LD) (LDH) ENZYME: CPT | Performed by: PHYSICIAN ASSISTANT

## 2021-10-07 PROCEDURE — 86140 C-REACTIVE PROTEIN: CPT | Performed by: PHYSICIAN ASSISTANT

## 2021-10-07 PROCEDURE — 36415 COLL VENOUS BLD VENIPUNCTURE: CPT | Performed by: PHYSICIAN ASSISTANT

## 2021-10-07 PROCEDURE — 250N000012 HC RX MED GY IP 250 OP 636 PS 637: Performed by: PHYSICIAN ASSISTANT

## 2021-10-07 PROCEDURE — 99232 SBSQ HOSP IP/OBS MODERATE 35: CPT | Performed by: FAMILY MEDICINE

## 2021-10-07 PROCEDURE — 120N000001 HC R&B MED SURG/OB

## 2021-10-07 PROCEDURE — 85384 FIBRINOGEN ACTIVITY: CPT | Performed by: PHYSICIAN ASSISTANT

## 2021-10-07 PROCEDURE — 82040 ASSAY OF SERUM ALBUMIN: CPT | Performed by: PHYSICIAN ASSISTANT

## 2021-10-07 PROCEDURE — 250N000011 HC RX IP 250 OP 636: Performed by: HOSPITALIST

## 2021-10-07 PROCEDURE — 99207 PR CDG-MDM COMPONENT: MEETS MODERATE - DOWN CODED: CPT | Performed by: FAMILY MEDICINE

## 2021-10-07 RX ADMIN — GUAIFENESIN 10 ML: 100 SOLUTION ORAL at 02:17

## 2021-10-07 RX ADMIN — BENZONATATE 100 MG: 100 CAPSULE ORAL at 21:13

## 2021-10-07 RX ADMIN — GUAIFENESIN 10 ML: 100 SOLUTION ORAL at 14:03

## 2021-10-07 RX ADMIN — ENOXAPARIN SODIUM 40 MG: 100 INJECTION SUBCUTANEOUS at 12:30

## 2021-10-07 RX ADMIN — GUAIFENESIN 10 ML: 100 SOLUTION ORAL at 06:23

## 2021-10-07 RX ADMIN — ACETAMINOPHEN 975 MG: 325 TABLET, FILM COATED ORAL at 00:11

## 2021-10-07 RX ADMIN — ALBUTEROL SULFATE 6 PUFF: 90 AEROSOL, METERED RESPIRATORY (INHALATION) at 21:25

## 2021-10-07 RX ADMIN — FAMOTIDINE 20 MG: 20 TABLET ORAL at 08:56

## 2021-10-07 RX ADMIN — ACETAMINOPHEN 975 MG: 325 TABLET, FILM COATED ORAL at 23:26

## 2021-10-07 RX ADMIN — FAMOTIDINE 20 MG: 20 TABLET ORAL at 21:13

## 2021-10-07 RX ADMIN — BENZONATATE 100 MG: 100 CAPSULE ORAL at 06:23

## 2021-10-07 RX ADMIN — ENOXAPARIN SODIUM 40 MG: 100 INJECTION SUBCUTANEOUS at 00:11

## 2021-10-07 RX ADMIN — GUAIFENESIN 10 ML: 100 SOLUTION ORAL at 21:13

## 2021-10-07 RX ADMIN — ENOXAPARIN SODIUM 40 MG: 100 INJECTION SUBCUTANEOUS at 23:26

## 2021-10-07 RX ADMIN — ALBUTEROL SULFATE 6 PUFF: 90 AEROSOL, METERED RESPIRATORY (INHALATION) at 00:11

## 2021-10-07 RX ADMIN — BENZONATATE 100 MG: 100 CAPSULE ORAL at 14:03

## 2021-10-07 RX ADMIN — ACETAMINOPHEN 975 MG: 325 TABLET, FILM COATED ORAL at 08:56

## 2021-10-07 RX ADMIN — ACETAMINOPHEN 975 MG: 325 TABLET, FILM COATED ORAL at 16:40

## 2021-10-07 RX ADMIN — DEXAMETHASONE 6 MG: 2 TABLET ORAL at 12:30

## 2021-10-07 ASSESSMENT — ACTIVITIES OF DAILY LIVING (ADL)
ADLS_ACUITY_SCORE: 6

## 2021-10-07 NOTE — PLAN OF CARE
Patient alert and oriented, denies pain.  Oxygen sats 90-95% on HFNC at 40L and 55%. Oxygen sats noted to decrease to 87-90% during cough spells but rebound within a few minutes. Robitussin and tessalon pearls PRN given for productive cough, scheduled tylenol given.  PRN albuterol inhaler given x1. Patient able to makes needs known, he is up independently to bedside commode.

## 2021-10-07 NOTE — PROGRESS NOTES
[facility] Hospitalist Service      Subjective:  Patient says he feels better.  His oxygen needs are relatively stable.  He is eating.  He is ambulatory.  Has no new complaints.  Desats with coughing spells    Review of Systems:  CONSTITUTIONAL weak  INTEGUMENTARY/SKIN: NEGATIVE for worrisome rashes, moles or lesions  EYES: NEGATIVE for vision changes or irritation  ENT/MOUTH: NEGATIVE for ear, mouth and throat problems  RESP: Shortness of breath is stable.  Coughing spells.  BREAST: NEGATIVE for masses, tenderness or discharge  CV: NEGATIVE for chest pain, palpitations or peripheral edema  GI: NEGATIVE for nausea, abdominal pain, heartburn, or change in bowel habits  : NEGATIVE for frequency, dysuria, or hematuria  MUSCULOSKELETAL: NEGATIVE for significant arthralgias or myalgia  NEURO: NEGATIVE for weakness, dizziness or paresthesias  ENDOCRINE: NEGATIVE for temperature intolerance, skin/hair changes  HEME: NEGATIVE for bleeding problems  PSYCHIATRIC: NEGATIVE for changes in mood or affect    Physical Exam:  Vitals Were Reviewed    Patient Vitals for the past 16 hrs:   BP Temp Temp src Pulse Resp SpO2   10/07/21 0851 119/81 97.8  F (36.6  C) Oral 71 18 94 %   10/07/21 0221 121/81 98.4  F (36.9  C) Oral 71 18 96 %   10/07/21 0013 111/71 98  F (36.7  C) Oral 77 18 --   10/06/21 2145 -- -- -- -- -- 95 %   10/06/21 2138 107/73 98.2  F (36.8  C) Oral 67 18 96 %   10/06/21 1909 105/77 98.1  F (36.7  C) Oral 100 18 94 %   10/06/21 1906 105/77 -- Oral 97 18 94 %         Intake/Output Summary (Last 24 hours) at 10/7/2021 1052  Last data filed at 10/6/2021 1204  Gross per 24 hour   Intake 500 ml   Output --   Net 500 ml       GENERAL APPEARANCE: healthy, alert and no distress  EYES: conjunctiva clear, eyes grossly normal  RESP: Some scattered crackles but no distress  CV: regular rate and rhythm, normal S1 S2, no S3 or S4 and no murmur, click or rub   ABDOMEN: soft, nontender, no HSM or masses and bowel sounds  normal  MS: no clubbing, cyanosis; no edema  SKIN: clear without significant rashes or lesions    Lab:  Recent Labs   Lab Test 10/07/21  0548 10/06/21  0515    141   POTASSIUM 3.8 3.6   CHLORIDE 108 110*   CO2 27 25   ANIONGAP 6 6   * 143*   BUN 27 22   CR 0.80 0.74   KAIT 8.6 8.5     CBC RESULTS:   Recent Labs   Lab Test 10/07/21  0548 10/06/21  0515 10/06/21  0515   WBC 4.9  --  5.6   RBC 4.88  --  4.61   HGB 14.3  --  13.6   HCT 42.1   < > 39.0*     --  274    < > = values in this interval not displayed.       Results for orders placed or performed during the hospital encounter of 10/04/21 (from the past 24 hour(s))   CBC with platelets   Result Value Ref Range    WBC Count 4.9 4.0 - 11.0 10e3/uL    RBC Count 4.88 4.40 - 5.90 10e6/uL    Hemoglobin 14.3 13.3 - 17.7 g/dL    Hematocrit 42.1 40.0 - 53.0 %    MCV 86 78 - 100 fL    MCH 29.3 26.5 - 33.0 pg    MCHC 34.0 31.5 - 36.5 g/dL    RDW 12.2 10.0 - 15.0 %    Platelet Count 325 150 - 450 10e3/uL   Fibrinogen activity   Result Value Ref Range    Fibrinogen Activity 642 (H) 170 - 490 mg/dL   CRP inflammation   Result Value Ref Range    CRP Inflammation 31.1 (H) 0.0 - 8.0 mg/L   D dimer quantitative   Result Value Ref Range    D-Dimer Quantitative 0.66 (H) 0.00 - 0.50 ug/mL FEU    Narrative    This D-dimer assay is intended for use in conjunction with a clinical pretest probability assessment model to exclude pulmonary embolism (PE) and deep venous thrombosis (DVT) in outpatients suspected of PE or DVT. The cut-off value is 0.50 ug/mL FEU.   Comprehensive metabolic panel   Result Value Ref Range    Sodium 141 133 - 144 mmol/L    Potassium 3.8 3.4 - 5.3 mmol/L    Chloride 108 94 - 109 mmol/L    Carbon Dioxide (CO2) 27 20 - 32 mmol/L    Anion Gap 6 3 - 14 mmol/L    Urea Nitrogen 27 7 - 30 mg/dL    Creatinine 0.80 0.66 - 1.25 mg/dL    Calcium 8.6 8.5 - 10.1 mg/dL    Glucose 125 (H) 70 - 99 mg/dL    Alkaline Phosphatase 69 40 - 150 U/L    AST 67 (H) 0 -  45 U/L     (H) 0 - 70 U/L    Protein Total 6.4 (L) 6.8 - 8.8 g/dL    Albumin 2.4 (L) 3.4 - 5.0 g/dL    Bilirubin Total 0.4 0.2 - 1.3 mg/dL    GFR Estimate >90 >60 mL/min/1.73m2   Lactate Dehydrogenase   Result Value Ref Range    Lactate Dehydrogenase 430 (H) 85 - 227 U/L   Troponin I   Result Value Ref Range    Troponin I <0.015 0.000 - 0.045 ug/L       Assessment and Plan:         Percy Curtis is a 46 year old male admitted on 10/4/2021. Patient previously seen in the ER 9/28/2021 and diagnosed with COVID-19.  He was discharged home with doxycycline x5 days.  Returned to ER 10/4 due to oxygen saturations in the 80s at home.  CT with multifocal PNA but no PE. Escalating oxygen needs overnight have prompted initiation of high flow nasal cannula this morning.        # Confirmed COVID-19 infection    # Acute Hypoxic Respiratory Failure secondary to COVID-19 infection  # Viral Pneumonia secondary to COVID-19 infection     Symptom Onset 9/23/2021   Date of 1st Positive Test 9/28/2021   Vaccination Status Declines Vaccine         - continuous pulse ox, COVID-19 special precautions.  Acetaminophen/ibuprofen as needed fevers  - Oxygen: continue current support with HFNC at 40L/min, 55%; titrate to keep SpO2 between 90-96%  - Labs: Daily Covid labs per protocol.   - Imaging: no additional imaging needed at this time  - Breathing treatments: no inhalers needed; avoid nebulizers in favor of MDIs   - IV fluids: not indicated at this time  - Antibiotics: not indicated   - COVID-Focused Medications: Dexamethasone 6 mg x 10 days or until hospital discharge, started on 10/4 and Toculizumab started on 10/5 after ID consult.  Received approval from ID  given escalating oxygen needs and elevated markers of severity high CRP and worsening LDH and fibrinogen. Patient declined remdesivir use due to concern for renal impairment in setting of known family history of polycytic kidney disease (although patient has not been  diagnosed with PCKD personally).   - DVT Prophylaxis: at high risk of thrombotic complications due to COVID-19 (DDimer = 0.88 ug/mL FEU (Ref range: 0.00 - 0.50 ug/mL FEU) ).          - LOW INTENSITY dosing: Lovenox 0.5 mg/kg two times a day as is on HFNC        - consider anticoag on discharge for 30 days & until return to normal mobility    --196--78  --657--430  fibrinogen 696--760--686--642  Ddimer 0.88--0.79  Troponin  less than 0.015--less than 0.015  --67  --117  WBC 5.6--4.9    Tocilizumab screening  -QuantiFERON-pending, hep B and hep C and HIV status are neg     Transaminitis-Covid related  Admit , . Alkphos normal. Patient has been using high amounts of acetaminophen recently, but admit acetaminophen level is normal. Patient has not had any alcohol since his COVID diagnosis, but does drink a few beers per week typically. Suspect LFT elevation is due to COVID rather than acetaminophen or alcohol.    See above     Hypokalemia  Admit K = 3.1.  Likely due to poor oral intake.  Improving.   Resolved.     Diet: Combination Diet Regular Diet Adult    DVT Prophylaxis: Enoxaparin (Lovenox) SQ  Sharma Catheter: Not present  Central Lines: None  Code Status: Full Code       Discussion:  Continue current therapies.  Probably 2-3 more days in hospital.    11:22 AM  Message left for wife Grecia

## 2021-10-08 LAB
ALBUMIN SERPL-MCNC: 2.3 G/DL (ref 3.4–5)
ALP SERPL-CCNC: 61 U/L (ref 40–150)
ALT SERPL W P-5'-P-CCNC: 89 U/L (ref 0–70)
ANION GAP SERPL CALCULATED.3IONS-SCNC: 5 MMOL/L (ref 3–14)
AST SERPL W P-5'-P-CCNC: 36 U/L (ref 0–45)
BILIRUB SERPL-MCNC: 0.4 MG/DL (ref 0.2–1.3)
BUN SERPL-MCNC: 23 MG/DL (ref 7–30)
CALCIUM SERPL-MCNC: 8.4 MG/DL (ref 8.5–10.1)
CHLORIDE BLD-SCNC: 108 MMOL/L (ref 94–109)
CO2 SERPL-SCNC: 28 MMOL/L (ref 20–32)
CREAT SERPL-MCNC: 0.73 MG/DL (ref 0.66–1.25)
CRP SERPL-MCNC: 15.2 MG/L (ref 0–8)
D DIMER PPP FEU-MCNC: 0.75 UG/ML FEU (ref 0–0.5)
ERYTHROCYTE [DISTWIDTH] IN BLOOD BY AUTOMATED COUNT: 11.8 % (ref 10–15)
FIBRINOGEN PPP-MCNC: 588 MG/DL (ref 170–490)
GFR SERPL CREATININE-BSD FRML MDRD: >90 ML/MIN/1.73M2
GLUCOSE BLD-MCNC: 100 MG/DL (ref 70–99)
HCT VFR BLD AUTO: 40.3 % (ref 40–53)
HGB BLD-MCNC: 13.9 G/DL (ref 13.3–17.7)
LDH SERPL L TO P-CCNC: 351 U/L (ref 85–227)
MCH RBC QN AUTO: 29.1 PG (ref 26.5–33)
MCHC RBC AUTO-ENTMCNC: 34.5 G/DL (ref 31.5–36.5)
MCV RBC AUTO: 85 FL (ref 78–100)
PLATELET # BLD AUTO: 338 10E3/UL (ref 150–450)
POTASSIUM BLD-SCNC: 3.7 MMOL/L (ref 3.4–5.3)
PROT SERPL-MCNC: 6.1 G/DL (ref 6.8–8.8)
RBC # BLD AUTO: 4.77 10E6/UL (ref 4.4–5.9)
SODIUM SERPL-SCNC: 141 MMOL/L (ref 133–144)
WBC # BLD AUTO: 4.9 10E3/UL (ref 4–11)

## 2021-10-08 PROCEDURE — 250N000013 HC RX MED GY IP 250 OP 250 PS 637: Performed by: INTERNAL MEDICINE

## 2021-10-08 PROCEDURE — 85384 FIBRINOGEN ACTIVITY: CPT | Performed by: PHYSICIAN ASSISTANT

## 2021-10-08 PROCEDURE — 82040 ASSAY OF SERUM ALBUMIN: CPT | Performed by: FAMILY MEDICINE

## 2021-10-08 PROCEDURE — 85027 COMPLETE CBC AUTOMATED: CPT | Performed by: PHYSICIAN ASSISTANT

## 2021-10-08 PROCEDURE — 250N000013 HC RX MED GY IP 250 OP 250 PS 637: Performed by: PHYSICIAN ASSISTANT

## 2021-10-08 PROCEDURE — 36415 COLL VENOUS BLD VENIPUNCTURE: CPT | Performed by: PHYSICIAN ASSISTANT

## 2021-10-08 PROCEDURE — 99207 PR CDG-MDM COMPONENT: MEETS MODERATE - DOWN CODED: CPT | Performed by: FAMILY MEDICINE

## 2021-10-08 PROCEDURE — 99232 SBSQ HOSP IP/OBS MODERATE 35: CPT | Performed by: FAMILY MEDICINE

## 2021-10-08 PROCEDURE — 86140 C-REACTIVE PROTEIN: CPT | Performed by: FAMILY MEDICINE

## 2021-10-08 PROCEDURE — 250N000012 HC RX MED GY IP 250 OP 636 PS 637: Performed by: PHYSICIAN ASSISTANT

## 2021-10-08 PROCEDURE — 250N000011 HC RX IP 250 OP 636: Performed by: HOSPITALIST

## 2021-10-08 PROCEDURE — 999N000215 HC STATISTIC HFNC ADULT NON-CPAP

## 2021-10-08 PROCEDURE — 83615 LACTATE (LD) (LDH) ENZYME: CPT | Performed by: PHYSICIAN ASSISTANT

## 2021-10-08 PROCEDURE — 120N000001 HC R&B MED SURG/OB

## 2021-10-08 PROCEDURE — 85379 FIBRIN DEGRADATION QUANT: CPT | Performed by: FAMILY MEDICINE

## 2021-10-08 RX ADMIN — GUAIFENESIN 10 ML: 100 SOLUTION ORAL at 03:13

## 2021-10-08 RX ADMIN — FAMOTIDINE 20 MG: 20 TABLET ORAL at 22:51

## 2021-10-08 RX ADMIN — ACETAMINOPHEN 975 MG: 325 TABLET, FILM COATED ORAL at 08:08

## 2021-10-08 RX ADMIN — ENOXAPARIN SODIUM 40 MG: 100 INJECTION SUBCUTANEOUS at 22:51

## 2021-10-08 RX ADMIN — ENOXAPARIN SODIUM 40 MG: 100 INJECTION SUBCUTANEOUS at 11:39

## 2021-10-08 RX ADMIN — ACETAMINOPHEN 975 MG: 325 TABLET, FILM COATED ORAL at 17:45

## 2021-10-08 RX ADMIN — FAMOTIDINE 20 MG: 20 TABLET ORAL at 08:07

## 2021-10-08 RX ADMIN — BENZONATATE 100 MG: 100 CAPSULE ORAL at 03:12

## 2021-10-08 RX ADMIN — ALBUTEROL SULFATE 6 PUFF: 90 AEROSOL, METERED RESPIRATORY (INHALATION) at 17:45

## 2021-10-08 RX ADMIN — DEXAMETHASONE 6 MG: 2 TABLET ORAL at 13:39

## 2021-10-08 ASSESSMENT — ACTIVITIES OF DAILY LIVING (ADL)
ADLS_ACUITY_SCORE: 6
ADLS_ACUITY_SCORE: 6
ADLS_ACUITY_SCORE: 8
ADLS_ACUITY_SCORE: 6
ADLS_ACUITY_SCORE: 8
ADLS_ACUITY_SCORE: 8

## 2021-10-08 NOTE — PROGRESS NOTES
"Patient alert and oriented. Received PRN tessalon pearls and robitussin cough syrup for cough. His oxygen saturations have been stable tonight. Discussed with patient decreasing high flow requirements. Patient now currently at 40 L and 35% FiO2. Patient tolerating well tonight.    /71 (BP Location: Left arm)   Pulse 68   Temp 98.1  F (36.7  C) (Oral)   Resp 18   Ht 1.727 m (5' 8\")   Wt 76.3 kg (168 lb 3.4 oz)   SpO2 94%   BMI 25.58 kg/m      "

## 2021-10-08 NOTE — PLAN OF CARE
Pt resting in bed all day, shifting weight independently. Denies pain. LS posterior upper lobe crackles. No coughing noted today. This RN reviewed with pt needing to prone during day, Pt verbalized good understanding, though has not proned as of the time of this note. Maintaining sats at 90-93% HFNC 35% 40L, did not attempt to adjust settings at this time. Pt had few times during day sats drop to 87%, while at rest, sitting in bed, lasting 30 sec to 2 minutes.VS otherwise stable. Call light in reach. Michelle Mandujano, RN BSN

## 2021-10-08 NOTE — PROGRESS NOTES
[facility]      Subjective:  He feels better.  Diarrhea gone.  He is eating better.  Oxygen needs less.    Review of Systems:  CONSTITUTIONAL: Feels stronger  INTEGUMENTARY/SKIN: NEGATIVE for worrisome rashes, moles or lesions  EYES: NEGATIVE for vision changes or irritation  ENT/MOUTH: NEGATIVE for ear, mouth and throat problems  RESP: Desats with coughing spells  BREAST: NEGATIVE for masses, tenderness or discharge  CV: NEGATIVE for chest pain, palpitations or peripheral edema  GI: Anorexia has resolved, diarrhea resolving  : NEGATIVE for frequency, dysuria, or hematuria  MUSCULOSKELETAL: NEGATIVE for significant arthralgias or myalgia  NEURO: NEGATIVE for weakness, dizziness or paresthesias  ENDOCRINE: NEGATIVE for temperature intolerance, skin/hair changes  HEME: NEGATIVE for bleeding problems  PSYCHIATRIC: NEGATIVE for changes in mood or affect    Physical Exam:  Vitals Were Reviewed    Patient Vitals for the past 16 hrs:   BP Temp Temp src Pulse Resp SpO2   10/08/21 0720 119/80 97.8  F (36.6  C) Oral 76 18 90 %   10/08/21 0315 -- -- -- -- -- 94 %   10/08/21 0125 -- -- -- -- -- 95 %   10/08/21 0100 -- -- -- -- -- 95 %   10/08/21 0030 -- -- -- -- -- 94 %   10/07/21 2345 -- -- -- -- -- 93 %   10/07/21 2331 104/71 98.1  F (36.7  C) Oral 68 -- 95 %   10/07/21 2330 -- -- -- -- -- 95 %   10/07/21 2000 104/73 98  F (36.7  C) Oral 72 18 95 %         Intake/Output Summary (Last 24 hours) at 10/8/2021 0812  Last data filed at 10/8/2021 0720  Gross per 24 hour   Intake 720 ml   Output --   Net 720 ml       GENERAL APPEARANCE: healthy, alert and no distress  EYES: conjunctiva clear, eyes grossly normal  RESP: lungs clear to auscultation - no rales, rhonchi or wheezes  CV: regular rate and rhythm, normal S1 S2, no S3 or S4 and no murmur, click or rub   ABDOMEN: soft, nontender, no HSM or masses and bowel sounds normal  MS: no clubbing, cyanosis; no edema  SKIN: clear without significant rashes or  lesions    Lab:  Recent Labs   Lab Test 10/08/21  0538 10/07/21  0548    141   POTASSIUM 3.7 3.8   CHLORIDE 108 108   CO2 28 27   ANIONGAP 5 6   * 125*   BUN 23 27   CR 0.73 0.80   KAIT 8.4* 8.6     CBC RESULTS:   Recent Labs   Lab Test 10/08/21  0538 10/07/21  0548 10/07/21  0548   WBC 4.9  --  4.9   RBC 4.77  --  4.88   HGB 13.9  --  14.3   HCT 40.3   < > 42.1     --  325    < > = values in this interval not displayed.       Results for orders placed or performed during the hospital encounter of 10/04/21 (from the past 24 hour(s))   CBC with platelets   Result Value Ref Range    WBC Count 4.9 4.0 - 11.0 10e3/uL    RBC Count 4.77 4.40 - 5.90 10e6/uL    Hemoglobin 13.9 13.3 - 17.7 g/dL    Hematocrit 40.3 40.0 - 53.0 %    MCV 85 78 - 100 fL    MCH 29.1 26.5 - 33.0 pg    MCHC 34.5 31.5 - 36.5 g/dL    RDW 11.8 10.0 - 15.0 %    Platelet Count 338 150 - 450 10e3/uL   Fibrinogen activity   Result Value Ref Range    Fibrinogen Activity 588 (H) 170 - 490 mg/dL   Lactate Dehydrogenase   Result Value Ref Range    Lactate Dehydrogenase 351 (H) 85 - 227 U/L   D dimer quantitative   Result Value Ref Range    D-Dimer Quantitative 0.75 (H) 0.00 - 0.50 ug/mL FEU    Narrative    This D-dimer assay is intended for use in conjunction with a clinical pretest probability assessment model to exclude pulmonary embolism (PE) and deep venous thrombosis (DVT) in outpatients suspected of PE or DVT. The cut-off value is 0.50 ug/mL FEU.   CRP inflammation   Result Value Ref Range    CRP Inflammation 15.2 (H) 0.0 - 8.0 mg/L   Comprehensive metabolic panel   Result Value Ref Range    Sodium 141 133 - 144 mmol/L    Potassium 3.7 3.4 - 5.3 mmol/L    Chloride 108 94 - 109 mmol/L    Carbon Dioxide (CO2) 28 20 - 32 mmol/L    Anion Gap 5 3 - 14 mmol/L    Urea Nitrogen 23 7 - 30 mg/dL    Creatinine 0.73 0.66 - 1.25 mg/dL    Calcium 8.4 (L) 8.5 - 10.1 mg/dL    Glucose 100 (H) 70 - 99 mg/dL    Alkaline Phosphatase 61 40 - 150 U/L    AST  36 0 - 45 U/L    ALT 89 (H) 0 - 70 U/L    Protein Total 6.1 (L) 6.8 - 8.8 g/dL    Albumin 2.3 (L) 3.4 - 5.0 g/dL    Bilirubin Total 0.4 0.2 - 1.3 mg/dL    GFR Estimate >90 >60 mL/min/1.73m2       Assessment and Plan:         Percy Curtis is a 46 year old male admitted on 10/4/2021. Patient previously seen in the ER 9/28/2021 and diagnosed with COVID-19.  He was discharged home with doxycycline x5 days.  Returned to ER 10/4 due to oxygen saturations in the 80s at home.  CT with multifocal PNA but no PE. Escalating oxygen needs overnight have prompted initiation of high flow nasal cannula this morning.        # Confirmed COVID-19 infection    # Acute Hypoxic Respiratory Failure secondary to COVID-19 infection  # Viral Pneumonia secondary to COVID-19 infection     Symptom Onset 9/23/2021   Date of 1st Positive Test 9/28/2021   Vaccination Status Declines Vaccine         - continuous pulse ox, COVID-19 special precautions.  Acetaminophen/ibuprofen as needed fevers  - Oxygen: continue current support with HFNC at 40L/min, 35%; titrate to keep SpO2 between 90-96%  - Labs: Daily Covid labs per protocol.   - Imaging: no additional imaging needed at this time  - Breathing treatments: no inhalers needed; avoid nebulizers in favor of MDIs   - IV fluids: not indicated at this time  - Antibiotics: not indicated   - COVID-Focused Medications: Dexamethasone 6 mg x 10 days or until hospital discharge, started on 10/4 and Toculizumab started on 10/5 after ID consult.  Received approval from ID  given escalating oxygen needs and elevated markers of severity high CRP and worsening LDH and fibrinogen. Patient declined remdesivir use due to concern for renal impairment in setting of known family history of polycytic kidney disease (although patient has not been diagnosed with PCKD personally).   - DVT Prophylaxis: at high risk of thrombotic complications due to COVID-19 (DDimer = 0.88 ug/mL FEU (Ref range: 0.00 - 0.50 ug/mL  FEU) ).          - LOW INTENSITY dosing: Lovenox 0.5 mg/kg two times a day as is on HFNC        - consider anticoag on discharge for 30 days & until return to normal mobility     --196--78--15.2  --657--430--351  fibrinogen 696--760--686--642--588  Ddimer 0.88--0.79--0.66--0.75  Troponin  less than 0.015--less than 0.015  --67-36  --117--89  WBC 5.6--4.9--4.9     Tocilizumab screening  -QuantiFERON-indeterminate.  hep B and hep C and HIV status are neg  Clinically improving-continue to follow.     Transaminitis-Covid related  Admit , . Alkphos normal. Patient has been using high amounts of acetaminophen recently, but admit acetaminophen level is normal. Patient has not had any alcohol since his COVID diagnosis, but does drink a few beers per week typically. Suspect LFT elevation is due to COVID rather than acetaminophen or alcohol.     See above     Hypokalemia  Admit K = 3.1.  Likely due to poor oral intake.  Improving. x  Resolved.     Diet: Combination Diet Regular Diet Adult    DVT Prophylaxis: Enoxaparin (Lovenox) SQ  Sharma Catheter: Not present  Central Lines: None  Code Status: Full Code       Discussion:   Continue current therapies.  Oxygen needs slightly reduced today.     8:42 AM  Message left on answering machine with update for his wife Grecia

## 2021-10-09 LAB
ALBUMIN SERPL-MCNC: 2.4 G/DL (ref 3.4–5)
ALP SERPL-CCNC: 62 U/L (ref 40–150)
ALT SERPL W P-5'-P-CCNC: 78 U/L (ref 0–70)
ANION GAP SERPL CALCULATED.3IONS-SCNC: 2 MMOL/L (ref 3–14)
AST SERPL W P-5'-P-CCNC: 34 U/L (ref 0–45)
BILIRUB SERPL-MCNC: 0.5 MG/DL (ref 0.2–1.3)
BUN SERPL-MCNC: 24 MG/DL (ref 7–30)
CALCIUM SERPL-MCNC: 7.9 MG/DL (ref 8.5–10.1)
CHLORIDE BLD-SCNC: 108 MMOL/L (ref 94–109)
CO2 SERPL-SCNC: 30 MMOL/L (ref 20–32)
CREAT SERPL-MCNC: 0.75 MG/DL (ref 0.66–1.25)
CRP SERPL-MCNC: 9.1 MG/L (ref 0–8)
D DIMER PPP FEU-MCNC: 0.62 UG/ML FEU (ref 0–0.5)
ERYTHROCYTE [DISTWIDTH] IN BLOOD BY AUTOMATED COUNT: 11.7 % (ref 10–15)
GFR SERPL CREATININE-BSD FRML MDRD: >90 ML/MIN/1.73M2
GLUCOSE BLD-MCNC: 93 MG/DL (ref 70–99)
HCT VFR BLD AUTO: 41.1 % (ref 40–53)
HGB BLD-MCNC: 14.4 G/DL (ref 13.3–17.7)
MCH RBC QN AUTO: 29.2 PG (ref 26.5–33)
MCHC RBC AUTO-ENTMCNC: 35 G/DL (ref 31.5–36.5)
MCV RBC AUTO: 83 FL (ref 78–100)
PLATELET # BLD AUTO: 372 10E3/UL (ref 150–450)
POTASSIUM BLD-SCNC: 3.9 MMOL/L (ref 3.4–5.3)
PROT SERPL-MCNC: 6 G/DL (ref 6.8–8.8)
RBC # BLD AUTO: 4.93 10E6/UL (ref 4.4–5.9)
SODIUM SERPL-SCNC: 140 MMOL/L (ref 133–144)
WBC # BLD AUTO: 5.2 10E3/UL (ref 4–11)

## 2021-10-09 PROCEDURE — 250N000013 HC RX MED GY IP 250 OP 250 PS 637: Performed by: INTERNAL MEDICINE

## 2021-10-09 PROCEDURE — 250N000011 HC RX IP 250 OP 636: Performed by: FAMILY MEDICINE

## 2021-10-09 PROCEDURE — 85379 FIBRIN DEGRADATION QUANT: CPT | Performed by: FAMILY MEDICINE

## 2021-10-09 PROCEDURE — 85027 COMPLETE CBC AUTOMATED: CPT | Performed by: FAMILY MEDICINE

## 2021-10-09 PROCEDURE — 999N000157 HC STATISTIC RCP TIME EA 10 MIN

## 2021-10-09 PROCEDURE — 99232 SBSQ HOSP IP/OBS MODERATE 35: CPT | Performed by: FAMILY MEDICINE

## 2021-10-09 PROCEDURE — 250N000012 HC RX MED GY IP 250 OP 636 PS 637: Performed by: PHYSICIAN ASSISTANT

## 2021-10-09 PROCEDURE — 99207 PR CDG-MDM COMPONENT: MEETS MODERATE - DOWN CODED: CPT | Performed by: FAMILY MEDICINE

## 2021-10-09 PROCEDURE — 120N000001 HC R&B MED SURG/OB

## 2021-10-09 PROCEDURE — 86140 C-REACTIVE PROTEIN: CPT | Performed by: FAMILY MEDICINE

## 2021-10-09 PROCEDURE — 250N000013 HC RX MED GY IP 250 OP 250 PS 637: Performed by: PHYSICIAN ASSISTANT

## 2021-10-09 PROCEDURE — 80053 COMPREHEN METABOLIC PANEL: CPT | Performed by: FAMILY MEDICINE

## 2021-10-09 PROCEDURE — 36415 COLL VENOUS BLD VENIPUNCTURE: CPT | Performed by: FAMILY MEDICINE

## 2021-10-09 RX ADMIN — ACETAMINOPHEN 975 MG: 325 TABLET, FILM COATED ORAL at 23:20

## 2021-10-09 RX ADMIN — GUAIFENESIN 10 ML: 100 SOLUTION ORAL at 10:20

## 2021-10-09 RX ADMIN — GUAIFENESIN 10 ML: 100 SOLUTION ORAL at 18:00

## 2021-10-09 RX ADMIN — ENOXAPARIN SODIUM 40 MG: 100 INJECTION SUBCUTANEOUS at 23:21

## 2021-10-09 RX ADMIN — ACETAMINOPHEN 975 MG: 325 TABLET, FILM COATED ORAL at 08:42

## 2021-10-09 RX ADMIN — ACETAMINOPHEN 975 MG: 325 TABLET, FILM COATED ORAL at 17:55

## 2021-10-09 RX ADMIN — DEXAMETHASONE 6 MG: 2 TABLET ORAL at 13:35

## 2021-10-09 RX ADMIN — FAMOTIDINE 20 MG: 20 TABLET ORAL at 21:21

## 2021-10-09 RX ADMIN — FAMOTIDINE 20 MG: 20 TABLET ORAL at 08:42

## 2021-10-09 ASSESSMENT — ACTIVITIES OF DAILY LIVING (ADL)
ADLS_ACUITY_SCORE: 8
ADLS_ACUITY_SCORE: 6
ADLS_ACUITY_SCORE: 6

## 2021-10-09 ASSESSMENT — MIFFLIN-ST. JEOR: SCORE: 1582.5

## 2021-10-09 NOTE — PLAN OF CARE
Patient maintained 95% on HFNC 40L 36%  Decreased HFNC to 40L 33% and oxygen sats at 92-93% currently.     Patient denies pain, states he feels his breathing is improving. Has productive cough.

## 2021-10-09 NOTE — PROGRESS NOTES
Assessment and Plan:           Percy Curtis is a 46 year old male admitted on 10/4/2021. Patient previously seen in the ER 9/28/2021 and diagnosed with COVID-19.  He was discharged home with doxycycline x5 days.  Returned to ER 10/4 due to oxygen saturations in the 80s at home.  CT with multifocal PNA but no PE.           # Confirmed COVID-19 infection    # Acute Hypoxic Respiratory Failure secondary to COVID-19 infection  # Viral Pneumonia secondary to COVID-19 infection     Symptom Onset 9/23/2021   Date of 1st Positive Test 9/28/2021   Vaccination Status Declines Vaccine         - continuous pulse ox, COVID-19 special precautions.  Acetaminophen/ibuprofen as needed fevers  - Oxygen: continue current support with HFNC at 40L/min, 35%; titrate to keep SpO2 between 90-96%  - Labs: Daily Covid labs per protocol.   - Imaging: no additional imaging needed at this time  - Breathing treatments: no inhalers needed; avoid nebulizers in favor of MDIs   - IV fluids: not indicated at this time  - Antibiotics: not indicated   - COVID-Focused Medications: Dexamethasone 6 mg x 10 days or until hospital discharge, started on 10/4 and Toculizumab started on 10/5 after ID consult.  Received approval from ID  given escalating oxygen needs and elevated markers of severity high CRP and worsening LDH and fibrinogen. Patient declined remdesivir use due to concern for renal impairment in setting of known family history of polycytic kidney disease (although patient has not been diagnosed with PCKD personally).   - DVT Prophylaxis: at high risk of thrombotic complications due to COVID-19 (DDimer = 0.88 ug/mL FEU (Ref range: 0.00 - 0.50 ug/mL FEU) ).          - LOW INTENSITY dosing: Lovenox 0.5 mg/kg two times a day as is on HFNC        - consider anticoag on discharge for 30 days & until return to normal mobility     --196--78--15.2--9.1  --657--430--351  fibrinogen 696--760--686--642--588  Ddimer  "0.88--0.79--0.66--0.75--0.62  Troponin  less than 0.015--less than 0.015  --67-36  --117--89  WBC 5.6--4.9--4.9       Tocilizumab screening  -QuantiFERON-indeterminate.  hep B and hep C and HIV status are neg  Clinically improving-continue to follow.     Transaminitis-Covid related  Admit , . Alkphos normal. Patient has been using high amounts of acetaminophen recently, but admit acetaminophen level is normal. Patient has not had any alcohol since his COVID diagnosis, but does drink a few beers per week typically. Suspect LFT elevation is due to COVID rather than acetaminophen or alcohol.        Hypokalemia  Admit K = 3.1.  Likely due to poor oral intake.  Improving. x  Resolved.     Diet: Combination Diet Regular Diet Adult    DVT Prophylaxis: Enoxaparin (Lovenox) SQ  Sharma Catheter: Not present  Central Lines: None  Code Status: Full Code       Discussion:   Continue current therapies.  Oxygen needs slightly reduced today.                 SUBJECTIVE:   Still shortness of breath, some cough but may be getting better  Appetite is okay  Strength feels getting better  No pain       ROS:4 point ROS including Respiratory, CV, GI and , other than that noted in the HPI,  is negative     OBJECTIVE:   /69   Pulse 79   Temp 98  F (36.7  C) (Oral)   Resp 18   Ht 1.727 m (5' 8\")   Wt 73.3 kg (161 lb 9.6 oz)   SpO2 92%   BMI 24.57 kg/m      GENERAL APPEARANCE: Awake, alert, no apparent distress on high flow nasal cannula at 40 L, 30% FiO2     RESP: Rales both bases     CV: regular rate and rhythm, no murmur , edema: None     Abdomen: soft, nontender, no liver or spleen enlargement, no masses, BSs normal   Skin: no cyanosis, pallor, or jaundice    CMPRecent Labs   Lab 10/09/21  0523 10/08/21  0538 10/07/21  0548 10/06/21  0515    141 141 141   POTASSIUM 3.9 3.7 3.8 3.6   CHLORIDE 108 108 108 110*   CO2 30 28 27 25   ANIONGAP 2* 5 6 6   GLC 93 100* 125* 143*   BUN 24 23 27 22   CR " 0.75 0.73 0.80 0.74   GFRESTIMATED >90 >90 >90 >90   KAIT 7.9* 8.4* 8.6 8.5   PROTTOTAL 6.0* 6.1* 6.4* 6.4*   ALBUMIN 2.4* 2.3* 2.4* 2.2*   BILITOTAL 0.5 0.4 0.4 0.3   ALKPHOS 62 61 69 71   AST 34 36 67* 123*   ALT 78* 89* 117* 137*     CBC  Recent Labs   Lab 10/09/21  0523 10/08/21  0538 10/07/21  0548 10/06/21  0515   WBC 5.2 4.9 4.9 5.6   RBC 4.93 4.77 4.88 4.61   HGB 14.4 13.9 14.3 13.6   HCT 41.1 40.3 42.1 39.0*   MCV 83 85 86 85   MCH 29.2 29.1 29.3 29.5   MCHC 35.0 34.5 34.0 34.9   RDW 11.7 11.8 12.2 12.3    338 325 274     INR  Recent Labs   Lab 10/04/21  1928   INR 1.06     Arterial BloodGas  Recent Labs   Lab 10/04/21  1536   O2PER 0      Venous Blood Gas  Recent Labs   Lab 10/04/21  1536   PHV 7.51*   PCO2V 35*   PO2V 23*   HCO3V 28   JERRY 4.8*   O2PER 0       Medications     acetaminophen  975 mg Oral Q8H     dexamethasone  6 mg Oral Daily     enoxaparin ANTICOAGULANT  40 mg Subcutaneous Q12H     famotidine  20 mg Oral BID     potassium chloride  40 mEq Oral Once     sodium chloride (PF)  3 mL Intracatheter Q8H       Intake/Output Summary (Last 24 hours) at 10/9/2021 0717  Last data filed at 10/9/2021 0704  Gross per 24 hour   Intake 1470 ml   Output 800 ml   Net 670 ml

## 2021-10-09 NOTE — PROVIDER NOTIFICATION
10/09/21 1757   Oxygen Therapy   SpO2 93 %   O2 Device Oxymask   Oxygen Delivery 7 LPM   Patient glad to be off highflow. 02 sats maintained at 93-94% on 7 lpm via oxymask. Up adlib in room. Appetite fair. Cough medication for intermittent cough.

## 2021-10-09 NOTE — PLAN OF CARE
"Pt is A/O, able to make needs known. Blood pressure 106/73, pulse 74, temperature 98  F (36.7  C), resp. rate 18, height 1.727 m (5' 8\"), weight 73.3 kg (161 lb 9.6 oz), SpO2 92 %.  Pt remains on HFNC. Patient desats with coughing. Frequent productive cough.   Resting intermittently. Denies pain, nausea, or emesis.   Continue to assess per plan of care.  "

## 2021-10-10 VITALS
HEIGHT: 68 IN | HEART RATE: 79 BPM | OXYGEN SATURATION: 91 % | BODY MASS INDEX: 24.49 KG/M2 | SYSTOLIC BLOOD PRESSURE: 93 MMHG | RESPIRATION RATE: 16 BRPM | TEMPERATURE: 98.2 F | WEIGHT: 161.6 LBS | DIASTOLIC BLOOD PRESSURE: 57 MMHG

## 2021-10-10 LAB
ALBUMIN SERPL-MCNC: 2.5 G/DL (ref 3.4–5)
ALP SERPL-CCNC: 59 U/L (ref 40–150)
ALT SERPL W P-5'-P-CCNC: 86 U/L (ref 0–70)
ANION GAP SERPL CALCULATED.3IONS-SCNC: 6 MMOL/L (ref 3–14)
AST SERPL W P-5'-P-CCNC: 37 U/L (ref 0–45)
BILIRUB SERPL-MCNC: 0.5 MG/DL (ref 0.2–1.3)
BUN SERPL-MCNC: 27 MG/DL (ref 7–30)
CALCIUM SERPL-MCNC: 8.4 MG/DL (ref 8.5–10.1)
CHLORIDE BLD-SCNC: 106 MMOL/L (ref 94–109)
CO2 SERPL-SCNC: 29 MMOL/L (ref 20–32)
CREAT SERPL-MCNC: 0.81 MG/DL (ref 0.66–1.25)
CRP SERPL-MCNC: 5 MG/L (ref 0–8)
D DIMER PPP FEU-MCNC: 0.53 UG/ML FEU (ref 0–0.5)
ERYTHROCYTE [DISTWIDTH] IN BLOOD BY AUTOMATED COUNT: 11.7 % (ref 10–15)
GFR SERPL CREATININE-BSD FRML MDRD: >90 ML/MIN/1.73M2
GLUCOSE BLD-MCNC: 95 MG/DL (ref 70–99)
HCT VFR BLD AUTO: 40.6 % (ref 40–53)
HGB BLD-MCNC: 14 G/DL (ref 13.3–17.7)
MCH RBC QN AUTO: 29 PG (ref 26.5–33)
MCHC RBC AUTO-ENTMCNC: 34.5 G/DL (ref 31.5–36.5)
MCV RBC AUTO: 84 FL (ref 78–100)
PLATELET # BLD AUTO: 418 10E3/UL (ref 150–450)
POTASSIUM BLD-SCNC: 4 MMOL/L (ref 3.4–5.3)
PROT SERPL-MCNC: 6.1 G/DL (ref 6.8–8.8)
RBC # BLD AUTO: 4.83 10E6/UL (ref 4.4–5.9)
SODIUM SERPL-SCNC: 141 MMOL/L (ref 133–144)
WBC # BLD AUTO: 6.1 10E3/UL (ref 4–11)

## 2021-10-10 PROCEDURE — 80053 COMPREHEN METABOLIC PANEL: CPT | Performed by: FAMILY MEDICINE

## 2021-10-10 PROCEDURE — 250N000013 HC RX MED GY IP 250 OP 250 PS 637: Performed by: INTERNAL MEDICINE

## 2021-10-10 PROCEDURE — 250N000013 HC RX MED GY IP 250 OP 250 PS 637: Performed by: PHYSICIAN ASSISTANT

## 2021-10-10 PROCEDURE — 99239 HOSP IP/OBS DSCHRG MGMT >30: CPT | Performed by: FAMILY MEDICINE

## 2021-10-10 PROCEDURE — 250N000012 HC RX MED GY IP 250 OP 636 PS 637: Performed by: PHYSICIAN ASSISTANT

## 2021-10-10 PROCEDURE — 85379 FIBRIN DEGRADATION QUANT: CPT | Performed by: FAMILY MEDICINE

## 2021-10-10 PROCEDURE — 86140 C-REACTIVE PROTEIN: CPT | Performed by: FAMILY MEDICINE

## 2021-10-10 PROCEDURE — 85027 COMPLETE CBC AUTOMATED: CPT | Performed by: FAMILY MEDICINE

## 2021-10-10 PROCEDURE — 36415 COLL VENOUS BLD VENIPUNCTURE: CPT | Performed by: FAMILY MEDICINE

## 2021-10-10 RX ADMIN — FAMOTIDINE 20 MG: 20 TABLET ORAL at 09:11

## 2021-10-10 RX ADMIN — DEXAMETHASONE 6 MG: 2 TABLET ORAL at 13:39

## 2021-10-10 RX ADMIN — ACETAMINOPHEN 975 MG: 325 TABLET, FILM COATED ORAL at 09:11

## 2021-10-10 ASSESSMENT — ACTIVITIES OF DAILY LIVING (ADL)
ADLS_ACUITY_SCORE: 6
ADLS_ACUITY_SCORE: 8

## 2021-10-10 NOTE — PLAN OF CARE
"Patient is A&Ox4. Up Ind in room uses commode at bedside. Patient denies any pain or nausea. Oxygen level set at 7 liters via oxymask. Patient is maintaining 92-94% on 7 liters. Writer tried weaning oxygen down to 6 patient sat around 87%. Some productive cough, with a small amount of white mucus. Lung sounds clear in all fields.Fluids promoted, along with use of IS. Saline locked.     /64   Pulse 72   Temp 98.6  F (37  C) (Oral)   Resp 18   Ht 1.727 m (5' 8\")   Wt 73.3 kg (161 lb 9.6 oz)   SpO2 94%   BMI 24.57 kg/m      Erica Potter RN on 10/10/2021 at 3:16 AM    "

## 2021-10-10 NOTE — PROGRESS NOTES
[facility] DISCHARGE NOTE    Patient discharged to home at 2:25 PM via wheel chair. Accompanied by spouse and staff. Discharge instructions reviewed with patient, opportunity offered to ask questions. Prescriptions sent to patients preferred pharmacy. All belongings sent with patient.    Lupe Valentin RN

## 2021-10-10 NOTE — DISCHARGE SUMMARY
[facility]    Percy Curtis MRN# [id number]   Age: 46 year old YOB: 1975     Date of Admission:  10/4/2021  Date of Discharge::  10/10/2021  Admitting Physician:  Renan Marquez MD  Discharge Physician:  Joni Hernandez MD  Primary Physician: No Ref-Primary, Physician       Home clinic:                Discharge Diagnosis:   Principle diagnosis: Acute hypoxic respiratory failure secondary to COVID-19 pneumonia    Secondary diagnoses:  Transaminitis secondary to Covid  Hypokalemia     Discharge Instructions:   For COVID-19 pneumonia  -No further medications for the pneumonia at this time  -You were stable for only a short time on room air, so we are not sending you out with that O2 prescription.  -It is certainly possible you may have some increasing O2 needs.  -Would keep your activity minimal for the next few days to continue to recover  -Check O2 sats intermittently and call or recheck in ED if persistently in the mid 80s.  You can tolerate some high 80s if there intermittent and resolve with rest and some deep breathing.  -To prevent blood clotting from this disease apixaban 2.5 mg twice daily x30 days      Follow up with primary care provider in 7 days        Procedures:       Results for orders placed or performed during the hospital encounter of 10/04/21   CT Chest Pulmonary Embolism w Contrast    Narrative    EXAM: CT CHEST PULMONARY EMBOLISM W CONTRAST  LOCATION: [facility]  DATE/TIME: 10/4/2021 5:31 PM    INDICATION: PE suspected, high prob --history of Covid, increasing shortness of breath  COMPARISON: None.  TECHNIQUE: CT chest pulmonary angiogram during arterial phase injection of IV contrast. Multiplanar reformats and MIP reconstructions were performed. Dose reduction techniques were used.   CONTRAST: 71 mL Isovue 370    FINDINGS:  ANGIOGRAM CHEST: No evidence for pulmonary embolism. Pulmonary arteries normal in caliber.  Thoracic aorta normal in caliber. No aortic dissection or other acute abnormality.    HEART: Cardiac chambers within normal limits. No pericardial effusion. No coronary artery calcification.    LUNGS AND PLEURA: Severe multifocal groundglass density with scattered areas of denser consolidation throughout the lungs compatible with COVID 19 pneumonia. No pleural effusion or pneumothorax.    MEDIASTINUM: No adenopathy or mass. Small hiatal hernia.    LIMITED UPPER ABDOMEN: Negative.    MUSCULOSKELETAL: Negative.      Impression    IMPRESSION:  1.  No evidence for pulmonary embolism.   2.  Severe pneumonia, appearance compatible with COVID 19 pneumonia.                    Allergies:      Allergies   Allergen Reactions     Food      watermelon, almonds, walnuts.     Penicillins Hives and Rash     Lumps on tongue                  Discharge Medications:     Current Discharge Medication List      START taking these medications    Details   apixaban ANTICOAGULANT (ELIQUIS) 2.5 MG tablet Take 1 tablet (2.5 mg) by mouth 2 times daily  Qty: 60 tablet, Refills: 0    Associated Diagnoses: Pneumonia due to 2019 novel coronavirus         CONTINUE these medications which have NOT CHANGED    Details   acetaminophen (TYLENOL) 500 MG tablet Take 1,000 mg by mouth every 6 hours as needed for mild pain      ibuprofen (ADVIL/MOTRIN) 200 MG tablet Take 400 mg by mouth every 4 hours as needed for mild pain      OVER-THE-COUNTER Take 2 tablets by mouth daily POTASSIUM      Phenylephrine-APAP-guaiFENesin (MUCINEX FAST-MAX) -400 MG/20ML LIQD Take 20 mLs by mouth every 4 hours as needed MUCINEX         STOP taking these medications       doxycycline monohydrate (MONODOX) 100 MG capsule Comments:   Reason for Stopping:                     Consultations:   None           Brief History of Presenting Illness:   Percy Curtis is a 46 year old male who presented to the emergency department with hypoxia on home pulse ox in the setting of  "known COVID.     Patient was exposed to SARS-CoV-2 by his brother a little over 2 weeks ago.  He started having symptoms about 12 days ago around 9/23/21.  He had a syncopal episode at home on 9/28 while on the toilet, after which he went to the [facility] emergency department.   He was diagnosed with COVID-19 infection and there was also concern for possible bacterial pneumonia as well, so he was prescribed doxycycline x 5 days.  He was discharged home with a pulse ox and told to return if sats were low.     He has continued to feel poorly since his prior emergency department visit.  He has been taking scheduled but alternating acetaminophen and ibuprofen qid.   Initially his O2 sats would sometimes drop below 90 but he would recover, although today he was persistently below 90 no matter what he did, so he returned to the emergency department.  He was found to have acute hypoxic respiratory failure due to COVID infection and is being admitted for ongoing cares.     Symptoms include a dry cough and persistent shortness of breath even at rest, along with slight wheezing.   Pain includes rib pain and abdominal pain associated with coughing, generalized myalgias, and headaches.  He has extreme fatigue, sleeping 18-20 hours per day but a little more energy the past 2 days.  Taste sensation is altered but he is able to taste things although he has a poor appetite, dry mouth and is not able to eat much.   Has alternating fevers and chills.  Some loose, watery diarrhea but no nausea, vomiting, or constipation.  No rashes, but some hand clamminess described as \"chalky feeling skin.\"  No rhinorrhea / nasal congestion, no sore throat, confusion, or slurred speech.  No confusion / slurred speech.  Has some balance issues, mostly when febrile.      He has not been vaccinated for COVID.  No current or former tobacco use.  He is healthy at baseline, no history of asthma, COPD, or other underlying respiratory problems.   He " has been using scheduled but alternating acetaminophen (1g q 6 hours) and ibuprofen (400 mg q 6 hours).                    Hospital Course:   Percy Curtis is a 46 year old male admitted on 10/4/2021. Patient previously seen in the ER 9/28/2021 and diagnosed with COVID-19.  He was discharged home with doxycycline x5 days.  Returned to ER 10/4 due to oxygen saturations in the 80s at home.  CT with multifocal PNA but no PE.            # Confirmed COVID-19 infection    # Acute Hypoxic Respiratory Failure secondary to COVID-19 infection  # Viral Pneumonia secondary to COVID-19 infection     Symptom Onset 9/23/2021   Date of 1st Positive Test 9/28/2021   Vaccination Status Declines Vaccine         - continuous pulse ox, COVID-19 special precautions.  Acetaminophen/ibuprofen as needed fevers  - Oxygen: continue current support with HFNC at 40L/min, 35%; titrate to keep SpO2 between 90-96%  - Labs: Daily Covid labs per protocol.   - Imaging: no additional imaging needed at this time  - Breathing treatments: no inhalers needed; avoid nebulizers in favor of MDIs   - IV fluids: not indicated at this time  - Antibiotics: not indicated   - COVID-Focused Medications: Dexamethasone 6 mg x 10 days or until hospital discharge, started on 10/4 and Toculizumab started on 10/5 after ID consult.  Received approval from ID  given escalating oxygen needs and elevated markers of severity high CRP and worsening LDH and fibrinogen. Patient declined remdesivir use due to concern for renal impairment in setting of known family history of polycytic kidney disease (although patient has not been diagnosed with PCKD personally).   - DVT Prophylaxis: at high risk of thrombotic complications due to COVID-19 (DDimer = 0.88 ug/mL FEU (Ref range: 0.00 - 0.50 ug/mL FEU) ).          - dosing: Lovenox 0.5 mg/kg two times a day as long as he is on HFNC        - consider anticoag on discharge for 30 days & until return to normal mobility     CRP  "214--196--78--15.2--9.1  --657--430--351  fibrinogen 696--760--686--642--588  Ddimer 0.88--0.79--0.66--0.75--0.62  Troponin  less than 0.015--less than 0.015  --67-36  --117--89  WBC 5.6--4.9--4.9        Tocilizumab screening  -QuantiFERON-indeterminate.  hep B and hep C and HIV status are neg  Clinically improving-continue to follow.     Transaminitis-Covid related  Admit , . Alkphos normal. Patient has been using high amounts of acetaminophen recently, but admit acetaminophen level is normal. Patient has not had any alcohol since his COVID diagnosis, but does drink a few beers per week typically. Suspect LFT elevation is due to COVID rather than acetaminophen or alcohol.        Hypokalemia  Admit K = 3.1.  Likely due to poor oral intake.  Improving. x  Resolved.     Diet: Combination Diet Regular Diet Adult    DVT Prophylaxis: Enoxaparin (Lovenox) SQ  Sharma Catheter: Not present  Central Lines: None  Code Status: Full Code                     Discharge Exam:   OBJECTIVE:   BP 92/64   Pulse 87   Temp 98  F (36.7  C) (Oral)   Resp 18   Ht 1.727 m (5' 8\")   Wt 73.3 kg (161 lb 9.6 oz)   SpO2 91%   BMI 24.57 kg/m      GENERAL APPEARANCE:  Alert, NAD, Ox3     RESP:clear      CV: regular rates and rhythm,no murmur, no click or rub - no edema     Abdomen: soft, nontender, no liver or spleen enlargement, no masses, BSs normal   Skin: no cyanosis, pallor, or jaundice            Pending Tests at Discharge:     Unresulted Labs Ordered in the Past 30 Days of this Admission     No orders found from 9/4/2021 to 10/5/2021.                   Discharge Disposition:   Discharged to home      Attestation:  Amount of time performed on this discharge : 45 minutes.    Joni Hernandez MD MD      "

## 2021-10-14 ENCOUNTER — VIRTUAL VISIT (OUTPATIENT)
Dept: FAMILY MEDICINE | Facility: CLINIC | Age: 46
End: 2021-10-14
Payer: COMMERCIAL

## 2021-10-14 DIAGNOSIS — U07.1 PNEUMONIA DUE TO 2019 NOVEL CORONAVIRUS: Primary | ICD-10-CM

## 2021-10-14 DIAGNOSIS — J12.82 PNEUMONIA DUE TO 2019 NOVEL CORONAVIRUS: Primary | ICD-10-CM

## 2021-10-14 PROCEDURE — 99203 OFFICE O/P NEW LOW 30 MIN: CPT | Performed by: NURSE PRACTITIONER

## 2021-10-14 NOTE — LETTER
October 14, 2021      Percy Bobanc  [address] [address] [place] [address]        To Whom It May Concern:    Peryc Curtis  was seen on 10/14/2021.  Please excuse him  until 11/1/21 due to illness. He may work from home in small amounts of time as tolerated until then. Will follow up in clinic if not able to return to full shift without restrictions as of 11/1/2021.        Sincerely,        SHAILESH Leavitt CNP

## 2021-10-14 NOTE — PROGRESS NOTES
"Percy is a 46 year old who is being evaluated via a billable telephone visit.      What phone number would you like to be contacted at? mobile  How would you like to obtain your AVS? Mail a copy    Assessment & Plan     Pneumonia due to 2019 novel coronavirus  Improving               BMI:   Estimated body mass index is 24.57 kg/m  as calculated from the following:    Height as of 10/4/21: 1.727 m (5' 8\").    Weight as of 10/9/21: 73.3 kg (161 lb 9.6 oz).       FUTURE APPOINTMENTS:       - Follow-up visit in case of new or worsening symptoms.     No follow-ups on file.    SHAILESH Leavitt CNP  [facility]    Anais Greer is a 46 year old who presents for the following health issues     HPI       Hospital Follow-up Visit:    Hospital/Nursing Home/IP Rehab Facility: [facility]  Date of Admission: 10/4/21  Date of Discharge: 10/10/21  Reason(s) for Admission: COVID 19      Was your hospitalization related to COVID-19? YES   How are you feeling today? Much better  In the past 24 hours have you had shortness of breath when speaking, walking, or climbing stairs? My breathing issues have improved  Do you have a cough? Yes, I have a cough but it's not worse  When is the last time you had a fever greater than 100? Before he was in hospital   Are you having any other symptoms? Body aches, shortness of breath with talking, fatigue, muscle loss   Do you have any other stressors you would like to discuss with your provider? Job Concerns-need more time off to recover.        Monitoring his oxygen levels - at rest 92%, with activity 96%      Was the patient in the ICU or did the patient experience delirium during hospitalization?  No          Problems taking medications regularly:  None  Medication changes since discharge: None  Problems adhering to non-medication therapy:  None    Summary of hospitalization:  [facility] hospital discharge " summary reviewed  Diagnostic Tests/Treatments reviewed.  Follow up needed: none  Other Healthcare Providers Involved in Patient s Care:         None  Update since discharge: improved.  Additional issues: having trouble with talking, gets winded. Is a  and talks on the phone all day.       Post Discharge Medication Reconciliation: discharge medications reconciled and changed, per note/orders.  Plan of care communicated with patient   [id number]}    Review of Systems   Constitutional, HEENT, cardiovascular, pulmonary, gi and gu systems are negative, except as otherwise noted.      Objective           Vitals:  No vitals were obtained today due to virtual visit.    Physical Exam   healthy, alert and no distress  PSYCH: Alert and oriented times 3; coherent speech, normal   rate and volume, able to articulate logical thoughts, able   to abstract reason, no tangential thoughts, no hallucinations   or delusions  His affect is normal  RESP: No cough, no audible wheezing, NOT able to talk in full sentences  Remainder of exam unable to be completed due to telephone visits                Phone call duration: 20 minutes

## 2021-10-19 ENCOUNTER — NURSE TRIAGE (OUTPATIENT)
Dept: FAMILY MEDICINE | Facility: CLINIC | Age: 46
End: 2021-10-19

## 2021-10-19 NOTE — TELEPHONE ENCOUNTER
Reason for Call:  Other     Detailed comments: Patient calling to report that he is having a bloody nose and he is on blood thinner medication. Asking for recommendation.    Phone Number Patient can be reached at: Home number on file [phone] (home)    Best Time: any    Can we leave a detailed message on this number? YES    Call taken on 10/19/2021 at 12:06 PM by Buffy Zhou

## 2021-10-19 NOTE — TELEPHONE ENCOUNTER
Nurse Triage SBAR    Is this a 2nd Level Triage? YES, LICENSED PRACTITIONER REVIEW IS REQUIRED    Situation    : Patient had nosebleed this morning    Background    : Pt had nosebleed when blowing his nose this morning.  Lasted 5 min and was fairly mild.  No further issues.  He is on eliquis.     Assessment   : nosebleed on blood thinner.      (See information below for more triage details.)    Recommendation   : Second level triage - office visit TODAY recommended per protocol.  Do you think patient needs to be seen?  No appts available.    Routing to provider for recommendation on nosebleed.    Protocol Recommended Disposition: Telephone advice    Unable to reach patient/caregiver. Left message to return call to [facility] (To enter phone number for call back directly to clinic/staff member). Upon return call please notify caller of providers recommendations.    Reason for Disposition    Taking Coumadin (warfarin) or other strong blood thinner, or known bleeding disorder (e.g., thrombocytopenia)    Additional Information    Negative: Bleeding present > 30 minutes and using correct method of direct pressure    Negative: Bleeding now and second call after being instructed in correct technique of direct pressure    Negative: Lightheadedness or dizziness    Negative: Pale skin (pallor) of new onset or worsening    Negative: Has nasal packing (inserted by health care provider to control bleeding) and now has new rash    Negative: Has nasal packing and now has bleeding around the packing (Exception: few drops or ooze)    Negative: Patient sounds very sick or weak to the triager    Negative: Nosebleed followed nose injury    Negative: Fainted (passed out), or too weak to stand following large blood loss    Negative: Sounds like a life-threatening emergency to the triager    Negative: Large amount of blood has been lost (e.g., one cup)    Negative: Bleeding recurs 3 or more times in 24 hours despite direct  "pressure    Answer Assessment - Initial Assessment Questions  1. AMOUNT OF BLEEDING: \"How bad is the bleeding?\" \"How much blood was lost?\" \"Has the bleeding stopped?\"    - MILD: needed a couple tissues    - MODERATE: needed many tissues    - SEVERE: large blood clots, soaked many tissues, lasted more than 30 minutes       mild  2. ONSET: \"When did the nosebleed start?\"       This morning  3. FREQUENCY: \"How many nosebleeds have you had in the last 24 hours?\"       one  4. RECURRENT SYMPTOMS: \"Have there been other recent nosebleeds?\" If so, ask: \"How long did it take you to stop the bleeding?\" \"What worked best?\"       5 min to stop bleeding  5. CAUSE: \"What do you think caused this nosebleed?\"      Blowing the nose too hard  6. LOCAL FACTORS: \"Do you have any cold symptoms?\", \"Have you been rubbing or picking at your nose?\"      Yes   7. SYSTEMIC FACTORS: \"Do you have high blood pressure or any bleeding problems?\"      no  8. BLOOD THINNERS: \"Do you take any blood thinners?\" (e.g., coumadin, heparin, aspirin, Plavix)      yes  9. OTHER SYMPTOMS: \"Do you have any other symptoms?\" (e.g., lightheadedness)      no  10. PREGNANCY: \"Is there any chance you are pregnant?\" \"When was your last menstrual period?\"        n/a    Protocols used: NOSEBLEED-A-OH      "

## 2021-10-19 NOTE — TELEPHONE ENCOUNTER
No appointment needed unless unable to stop the bleeding then he needs to go to ER. Have him do Afrin nasal spray twice daily for next 3 days and avoid blowing nose.   SHAILESH Bazan [facility]

## 2021-10-30 ENCOUNTER — NURSE TRIAGE (OUTPATIENT)
Dept: NURSING | Facility: CLINIC | Age: 46
End: 2021-10-30

## 2021-10-30 ENCOUNTER — HOSPITAL ENCOUNTER (EMERGENCY)
Facility: CLINIC | Age: 46
Discharge: HOME | End: 2021-10-30
Attending: EMERGENCY MEDICINE | Admitting: EMERGENCY MEDICINE
Payer: COMMERCIAL

## 2021-10-30 VITALS
BODY MASS INDEX: 25.91 KG/M2 | WEIGHT: 170.42 LBS | DIASTOLIC BLOOD PRESSURE: 87 MMHG | HEART RATE: 96 BPM | TEMPERATURE: 98.3 F | RESPIRATION RATE: 18 BRPM | OXYGEN SATURATION: 96 % | SYSTOLIC BLOOD PRESSURE: 124 MMHG

## 2021-10-30 DIAGNOSIS — M25.472 LEFT ANKLE SWELLING: ICD-10-CM

## 2021-10-30 PROCEDURE — 99284 EMERGENCY DEPT VISIT MOD MDM: CPT | Mod: 25 | Performed by: EMERGENCY MEDICINE

## 2021-10-30 PROCEDURE — 76882 US LMTD JT/FCL EVL NVASC XTR: CPT | Mod: 26 | Performed by: EMERGENCY MEDICINE

## 2021-10-30 PROCEDURE — 76882 US LMTD JT/FCL EVL NVASC XTR: CPT | Performed by: EMERGENCY MEDICINE

## 2021-10-30 NOTE — TELEPHONE ENCOUNTER
"PCP: [facility] [place] : Mandy Fairfield CNP  COVID + early October/discharged from hospital Oct 10.  Slowly recovering. Getting better each day. I have had some side effects from healing. My left ankle is swollen today. I am on a blood thinner. I have been on my feet all day, which I have not done in the last 6 weeks. It is only slightly swollen, and is just in the ankle. No redness. No pain or tenderness.   Every once in awhile my previous injuries flare up--like my back, for about a minute. My left leg was very sore the third day after I left. I rubbed it and took Tylenol. T= 98.9 orally.  P 108-110. He states that when he was first released it was 110-120. His O2 sat=95-96 and he is on Eliquis. He took Tylenol @ 4:30pm for a headache. 2pm headache began, after eating the headache decreased.\"     Triaged to a disposition of See HCP within 4 hrs (or PCP triage).   Dr Alexia Pugh on call, paged via [employer] @ 6:20pm  Patient should be seen in the ER (for a possible US to check for a clot).   Contacted patient with this instruction. He agreed to come in to the ER now.     Geeta Menendez RN Triage Nurse Advisor 6:27 PM 10/30/2021    Reason for Disposition    [1] Thigh, calf, or ankle swelling AND [2] only 1 side    Additional Information    Negative: Chest pain    Negative: Followed an ankle injury    Negative: Ankle pain is main symptom    Negative: Swelling of both ankles (i.e., pedal edema)    Negative: Swelling of calf or leg    Negative: Difficulty breathing    Negative: Entire foot is cool or blue in comparison to other side    Negative: Fever    Negative: Patient sounds very sick or weak to the triager    Negative: [1] SEVERE pain (e.g., excruciating, unable to walk) AND [2] not improved after 2 hours of pain medicine    Negative: [1] Can't move swollen joint at all AND [2] no fever    Negative: [1] Redness AND [2] painful when touched AND [3] no fever    Negative: [1] Red area or streak [2] large (> 2 in. or 5 " cm)    Negative: [1] Thigh or calf pain AND [2] only 1 side AND [3] present > 1 hour    Protocols used: ANKLE SWELLING-A-AH  COVID 19 Nurse Triage Plan/Patient Instructions    Please be aware that novel coronavirus (COVID-19) may be circulating in the community. If you develop symptoms such as fever, cough, or SOB or if you have concerns about the presence of another infection including coronavirus (COVID-19), please contact your health care provider or visit [email or web address].     Disposition/Instructions    ED Visit recommended. Follow protocol based instructions.     Bring Your Own Device:  Please also bring your smart device(s) (smart phones, tablets, laptops) and their charging cables for your personal use and to communicate with your care team during your visit.    Thank you for taking steps to prevent the spread of this virus.  o Limit your contact with others.  o Wear a simple mask to cover your cough.  o Wash your hands well and often.    Resources    [facility]: About COVID-19: [email or web address]    CDC: What to Do If You're Sick: www.cdc.gov/coronavirus/2019-ncov/about/steps-when-sick.html    CDC: Ending Home Isolation: www.cdc.gov/coronavirus/2019-ncov/hcp/disposition-in-home-patients.html     CDC: Caring for Someone: www.cdc.gov/coronavirus/2019-ncov/if-you-are-sick/care-for-someone.html     [facility]: Interim Guidance for Hospital Discharge to Home: www.health.[facility].[place]/diseases/coronavirus/hcp/hospdischarge.pdf    [facility] clinical trials (COVID-19 research studies): [email or web address]-trials     Below are the COVID-19 hotlines at the [place] Department of [facility] ([facility]). Interpreters are available.   o For health questions: Call 160-412-5538 or 1-897.980.2239 (7 a.m. to 7 p.m.)  o For questions about schools and childcare: Call 980-099-6488 or 1-203.262.7347 (7 a.m. to 7 p.m.)

## 2021-10-31 NOTE — ED PROVIDER NOTES
History     Chief Complaint   Patient presents with     Ankle Pain     HPI  Percy Curtis is a 46 year old male with history notable for SARS-CoV-2 infection, discharge from hospital 20 days ago on apixaban for DVT prophylaxis who presents emergency department with left ankle swelling and concern for DVT.  Patient reports compliance with his apixaban.  Says he was late on 1 dose and had to adjust his scheduling but tries to take it at 9 AM and 9 PM.  No obvious injury to ankle no ankle pain.  When taking off his shoes earlier today there was an indentation from where his sock was on his left ankle.  No edema of his leg.  No pain or swelling in his calf.  No erythema or ecchymosis.  No reported trauma or injury.  No foot pain.  No chest pain or shortness of breath.    The patient's PMHx, Surgical Hx, Allergies, and Medications were all reviewed with the patient.    Allergies:  Allergies   Allergen Reactions     Food      watermelon, almonds, walnuts.     Penicillins Hives and Rash     Lumps on tongue       Problem List:    Patient Active Problem List    Diagnosis Date Noted     Acute respiratory failure with hypoxia (H) 10/04/2021     Priority: Medium     Pneumonia due to 2019 novel coronavirus 10/04/2021     Priority: Medium     LFT elevation 10/04/2021     Priority: Medium     Hypokalemia 10/04/2021     Priority: Medium        Past Medical History:    No past medical history on file.    Past Surgical History:    Past Surgical History:   Procedure Laterality Date     TONSILLECTOMY         Family History:    Family History   Problem Relation Age of Onset     Heart Disease Mother      Heart Disease Father      Aneurysm Father      Polycystic Kidney Diease Father      LUNG DISEASE Brother      Polycystic Kidney Diease Brother        Social History:  Marital Status:   [2]  Social History     Tobacco Use     Smoking status: Never Smoker     Smokeless tobacco: Never Used   Substance Use Topics     Alcohol  use: Yes     Comment: A few drinks per week, mostly beer;     Drug use: Never        Medications:    acetaminophen (TYLENOL) 500 MG tablet  acetylcysteine (NAC) 600 MG CAPS capsule  apixaban ANTICOAGULANT (ELIQUIS) 2.5 MG tablet  ibuprofen (ADVIL/MOTRIN) 200 MG tablet  OVER-THE-COUNTER  Phenylephrine-APAP-guaiFENesin (MUCINEX FAST-MAX) -400 MG/20ML LIQD          Review of Systems   A complete review of systems performed and is otherwise negative except as noted in the HPI.     Physical Exam   BP: (!) 136/92  Pulse: 103  Temp: 98.1  F (36.7  C)  Resp: 16  Weight: 77.3 kg (170 lb 6.7 oz)  SpO2: 95 %    Physical Exam  GEN: Awake, alert, and cooperative. No acute distress.  Appears anxious  HENT: MMM. External ears and nose normal bilaterally.  EYES: EOM intact. Conjunctiva clear. No discharge.   NECK: Symmetric, freely mobile.   CV : Regular rate and rhythm.  PULM: Normal effort.  Speaking in full sentences  NEURO: Normal speech. Following commands. CN II-XII grossly intact. Answering questions and interacting appropriately.   EXT: Never had recommend this recommend that abdominal is gone leaving the same, as you question as well all 60s  INT: Warm. No diaphoresis. Normal color.        ED Course        Procedures  Results for orders placed during the hospital encounter of 10/30/21    POC US FOR DVT UNILATERAL LIMITED    Impression  [facility] Procedure Note    Limited Bedside ED Ultrasound for DVT:    PERFORMED BY: Dr. Christopher Chang MD  INDICATIONS:  Leg swelling  PROBE: High frequency linear probe  BODY LOCATION:  Left leg  FINDINGS:  Left:  Common femoral vein:  Compressible  Thrombus:  Absent  Superficial femoral vein:  Compressible  Thrombus:  Absent  Popliteal vein: Compressible  Thrombus:  Absent    INTERPRETATION:  The common femoral, superficial femoral and popliteal veins were identified and differentiated from the corresponding arteries.  Compression of the vein demonstrated no DVT and no  thrombus was visualized in the veins.  IMAGE DOCUMENTATION: Images were archived to PACs system.          Critical Care time:  none               Results for orders placed or performed during the hospital encounter of 10/30/21 (from the past 24 hour(s))   POC US FOR DVT UNILATERAL LIMITED    Impression    [facility] Procedure Note      Limited Bedside ED Ultrasound for DVT:    PERFORMED BY: Dr. Christopher Chang MD  INDICATIONS:  Leg swelling  PROBE: High frequency linear probe  BODY LOCATION:  Left leg  FINDINGS:    Left:      Common femoral vein:  Compressible    Thrombus:  Absent   Superficial femoral vein:  Compressible    Thrombus:  Absent   Popliteal vein: Compressible    Thrombus:  Absent    INTERPRETATION:  The common femoral, superficial femoral and popliteal veins were identified and differentiated from the corresponding arteries.  Compression of the vein demonstrated no DVT and no thrombus was visualized in the veins.    IMAGE DOCUMENTATION: Images were archived to PACs system.         Medications - No data to display    Assessments & Plan (with Medical Decision Making)   46 year old male with past medical history significant for SARS-CoV-2 and action discharged from hospital 20 days ago on apixaban with 1 day of painless nonpitting ankle edema who presents with concern for DVT.  No erythema or ecchymosis.  No calf tenderness or edema.  Point-of-care ultrasound with two-point compression of femoral and popliteal veins.  Very low suspicion for DVT.  Recommend patient continue to take his apixaban as prescribed.  No ankle or foot pain.  No reported injury.  Do not feel that additional imaging would be beneficial at this time.  Patient feels reassured after ultrasound.  Discharged in stable condition.  ED return precautions discussed.    I have reviewed the nursing notes.         Discharge Medication List as of 10/30/2021  8:11 PM          Final diagnoses:   Left ankle swelling     Christopher SINGH  Bernardo WOODALL    10/30/2021   [facility] EMERGENCY DEPT    Disclaimer: This note consists of words and symbols derived from keyboarding and dictation using voice recognition software.  As a result, there may be errors that have gone undetected.  Please consider this when interpreting information found in this note.             Christopher Chang MD  10/30/21 2039

## 2021-10-31 NOTE — DISCHARGE INSTRUCTIONS
No signs of blood clot in your left leg. With no ankle pain, I don't suspect a sprain or acute injury. Elevate your leg when able and see if this helps your swelling. Follow up with your primary provider if you swelling worsens.     If your symptoms worsen or you develop new or concerning symptoms, please return to the Emergency Department for further evaluation and treatment.

## 2021-10-31 NOTE — ED TRIAGE NOTES
Recovering from covid. Had covid on sept 20. Now has left ankle swelling and is afraid it is a blood clot.

## 2021-11-03 ENCOUNTER — NURSE TRIAGE (OUTPATIENT)
Dept: FAMILY MEDICINE | Facility: CLINIC | Age: 46
End: 2021-11-03

## 2021-11-03 NOTE — TELEPHONE ENCOUNTER
Patient states that he had COVID in late September.  His taste is nearing 100% back.  States that he is concerned with tingling under his chin every so often.  Last 2-3 days.  When he grabs it, the chin feels a little numb on the left.  This lasts several seconds to minutes.  He does report that he has a beard and sometimes the beard can cause similar symptoms.  He is going to shave his beard to see if that makes a difference.      Additional Information    Negative: Difficult to awaken or acting confused (e.g., disoriented, slurred speech)    Negative: New neurologic deficit that is present NOW, sudden onset of ANY of the following: * Weakness of the face, arm, or leg on one side of the body* Numbness of the face, arm, or leg on one side of the body* Loss of speech or garbled speech    Negative: Sounds like a life-threatening emergency to the triager    Negative: Confusion, disorientation, or hallucinations is the main symptom    Negative: Dizziness is the main symptom    Negative: Followed a head injury within last 3 days    Negative: Headache (with neurologic deficit)    Negative: Unable to urinate (or only a few drops) and bladder feels very full    Negative: Loss of control of bowel or bladder (i.e., incontinence) of new onset    Negative: Back pain with numbness (loss of sensation) in groin or rectal area    Negative: Patient sounds very sick or weak to the triager    Negative: Neurologic deficit that was brief (now gone), ANY of the following: * Weakness of the face, arm, or leg on one side of the body * Numbness of the face, arm, or leg on one side of the body * Loss of speech or garbled speech    Negative: Neurologic deficit of gradual onset, ANY of the following: * Weakness of the face, arm, or leg on one side of the body * Numbness of the face, arm, or leg on one side of the body * Loss of speech or garbled speech    Negative: [place] palsy suspected (i.e., weakness only one side of the face, developing  "over hours to days, no other symptoms)    Negative: Tingling (e.g., pins and needles) of the face, arm or leg on one side of the body, that is  present now (Exceptions: chronic/recurrent symptom lasting > 4 weeks or tingling from known cause, such as: bumped elbow, carpal tunnel syndrome, pinched nerve, frostbite)    Negative: Neck pain (with neurologic deficit)    Negative: Back pain (with neurologic deficit)    Negative: Patient wants to be seen    Negative: Loss of speech or garbled speech is a chronic symptom (recurrent or ongoing problem lasting > 4 weeks)    Negative: Weakness of arm or leg is a chronic symptom (recurrent or ongoing problem lasting > 4 weeks)    Negative: Numbness or tingling in one or both hands is a chronic symptom (recurrent or ongoing problem lasting > 4 weeks)    Negative: Numbness or tingling in one or both feet is a chronic symptom (recurrent or ongoing problem lasting > 4 weeks)    Negative: Numbness or tingling on both sides of body and is a new symptom lasting > 24 hours    Negative: Brief (now gone) tingling in hand (e.g., pins and needles) after prolonged laying on arm    Negative: Brief (now gone) tingling in foot (e.g., pins and needles) after prolonged sitting with legs crossed    Negative: Brief (now gone) numbness (or tingling or burning) in hand and fingers after bumped elbow    Answer Assessment - Initial Assessment Questions  1. SYMPTOM: \"What is the main symptom you are concerned about?\" (e.g., weakness, numbness)      Numbness on left side of chin  2. ONSET: \"When did this start?\" (minutes, hours, days; while sleeping)      2-3 days ago  3. LAST NORMAL: \"When was the last time you were normal (no symptoms)?\"      2-3 days ago  4. PATTERN \"Does this come and go, or has it been constant since it started?\"  \"Is it present now?\"      Comes and goes.  Not present now  5. CARDIAC SYMPTOMS: \"Have you had any of the following symptoms: chest pain, difficulty breathing, " "palpitations?\"      no  6. NEUROLOGIC SYMPTOMS: \"Have you had any of the following symptoms: headache, dizziness, vision loss, double vision, changes in speech, unsteady on your feet?\"      no  7. OTHER SYMPTOMS: \"Do you have any other symptoms?\"      no  8. PREGNANCY: \"Is there any chance you are pregnant?\" \"When was your last menstrual period?\"      n/a    Protocols used: NEUROLOGIC DEFICIT-A-[place]      "

## 2021-11-03 NOTE — TELEPHONE ENCOUNTER
Reason for call:    Symptom or request:     Patient called stating that he is recovering from covid has some concern about numbness in chin area.     covid dx late sept.       Best Time:  any    Can we leave a detailed message on this number?  YES     Rosy VOSS  Station

## 2021-11-11 ENCOUNTER — TELEPHONE (OUTPATIENT)
Dept: FAMILY MEDICINE | Facility: CLINIC | Age: 46
End: 2021-11-11
Payer: COMMERCIAL

## 2021-11-11 NOTE — TELEPHONE ENCOUNTER
Patient was in the hospital for COVID and pneumonia. He was given tocilizumab. He is concerned about side effect from it. Also patient would like to make an appointment to establish care and have a physical. Appointment made.   Rach Franks RN

## 2021-11-11 NOTE — TELEPHONE ENCOUNTER
Reason for call:  Patient reporting a symptom    Symptom or request: Pt wants to speak to an RN regarding ongoing issues with his fingernails.  Please call patient and advise.      Duration (how long have symptoms been present): ongoing    Have you been treated for this before? Yes    Additional comments:     Phone Number patient can be reached at:  Home number on file [phone] (home)    Best Time:  any    Can we leave a detailed message on this number:  YES    Call taken on 11/11/2021 at 1:47 PM by Michelle Melendez

## 2021-11-18 ENCOUNTER — OFFICE VISIT (OUTPATIENT)
Dept: FAMILY MEDICINE | Facility: CLINIC | Age: 46
End: 2021-11-18
Payer: COMMERCIAL

## 2021-11-18 ENCOUNTER — ANCILLARY PROCEDURE (OUTPATIENT)
Dept: GENERAL RADIOLOGY | Facility: CLINIC | Age: 46
End: 2021-11-18
Attending: NURSE PRACTITIONER
Payer: COMMERCIAL

## 2021-11-18 VITALS
SYSTOLIC BLOOD PRESSURE: 124 MMHG | WEIGHT: 169 LBS | OXYGEN SATURATION: 97 % | HEART RATE: 107 BPM | DIASTOLIC BLOOD PRESSURE: 86 MMHG | TEMPERATURE: 97.3 F | HEIGHT: 67 IN | RESPIRATION RATE: 18 BRPM | BODY MASS INDEX: 26.53 KG/M2

## 2021-11-18 DIAGNOSIS — U09.9 POST-COVID-19 SYNDROME MANIFESTING AS CHRONIC JOINT PAIN: ICD-10-CM

## 2021-11-18 DIAGNOSIS — M25.50 POST-COVID-19 SYNDROME MANIFESTING AS CHRONIC JOINT PAIN: ICD-10-CM

## 2021-11-18 DIAGNOSIS — Z13.6 CARDIOVASCULAR SCREENING; LDL GOAL LESS THAN 130: ICD-10-CM

## 2021-11-18 DIAGNOSIS — G89.29 POST-COVID-19 SYNDROME MANIFESTING AS CHRONIC JOINT PAIN: ICD-10-CM

## 2021-11-18 DIAGNOSIS — R07.89 ATYPICAL CHEST PAIN: ICD-10-CM

## 2021-11-18 DIAGNOSIS — R00.2 POST-COVID-19 SYNDROME MANIFESTING AS CHRONIC PALPITATIONS: ICD-10-CM

## 2021-11-18 DIAGNOSIS — Z00.00 ROUTINE PHYSICAL EXAMINATION: Primary | ICD-10-CM

## 2021-11-18 DIAGNOSIS — U09.9 POST-COVID-19 SYNDROME MANIFESTING AS CHRONIC PALPITATIONS: ICD-10-CM

## 2021-11-18 DIAGNOSIS — L60.8 NAIL DISCOLORATION: ICD-10-CM

## 2021-11-18 LAB
ALBUMIN SERPL-MCNC: 4.1 G/DL (ref 3.4–5)
ALP SERPL-CCNC: 92 U/L (ref 40–150)
ALT SERPL W P-5'-P-CCNC: 46 U/L (ref 0–70)
ANION GAP SERPL CALCULATED.3IONS-SCNC: 4 MMOL/L (ref 3–14)
AST SERPL W P-5'-P-CCNC: 20 U/L (ref 0–45)
B BURGDOR IGG+IGM SER QL: 0.17
BILIRUB SERPL-MCNC: 0.7 MG/DL (ref 0.2–1.3)
BUN SERPL-MCNC: 15 MG/DL (ref 7–30)
CALCIUM SERPL-MCNC: 9.6 MG/DL (ref 8.5–10.1)
CHLORIDE BLD-SCNC: 106 MMOL/L (ref 94–109)
CHOLEST SERPL-MCNC: 202 MG/DL
CO2 SERPL-SCNC: 30 MMOL/L (ref 20–32)
CREAT SERPL-MCNC: 0.8 MG/DL (ref 0.66–1.25)
CRP SERPL-MCNC: <2.9 MG/L (ref 0–8)
DEPRECATED CALCIDIOL+CALCIFEROL SERPL-MC: 31 UG/L (ref 20–75)
ERYTHROCYTE [DISTWIDTH] IN BLOOD BY AUTOMATED COUNT: 13 % (ref 10–15)
FASTING STATUS PATIENT QL REPORTED: NO
FOLATE SERPL-MCNC: 20.8 NG/ML
GFR SERPL CREATININE-BSD FRML MDRD: >90 ML/MIN/1.73M2
GLUCOSE BLD-MCNC: 127 MG/DL (ref 70–99)
HCT VFR BLD AUTO: 44.5 % (ref 40–53)
HDLC SERPL-MCNC: 67 MG/DL
HGB BLD-MCNC: 15.3 G/DL (ref 13.3–17.7)
LDLC SERPL CALC-MCNC: 86 MG/DL
MAGNESIUM SERPL-MCNC: 2.6 MG/DL (ref 1.6–2.3)
MCH RBC QN AUTO: 29 PG (ref 26.5–33)
MCHC RBC AUTO-ENTMCNC: 34.4 G/DL (ref 31.5–36.5)
MCV RBC AUTO: 84 FL (ref 78–100)
NONHDLC SERPL-MCNC: 135 MG/DL
PLATELET # BLD AUTO: 454 10E3/UL (ref 150–450)
POTASSIUM BLD-SCNC: 3.9 MMOL/L (ref 3.4–5.3)
PROT SERPL-MCNC: 7.8 G/DL (ref 6.8–8.8)
RBC # BLD AUTO: 5.27 10E6/UL (ref 4.4–5.9)
SODIUM SERPL-SCNC: 140 MMOL/L (ref 133–144)
TRIGL SERPL-MCNC: 243 MG/DL
TSH SERPL DL<=0.005 MIU/L-ACNC: 1.04 MU/L (ref 0.4–4)
VIT B12 SERPL-MCNC: 286 PG/ML (ref 193–986)
WBC # BLD AUTO: 6.9 10E3/UL (ref 4–11)

## 2021-11-18 PROCEDURE — 36415 COLL VENOUS BLD VENIPUNCTURE: CPT | Performed by: NURSE PRACTITIONER

## 2021-11-18 PROCEDURE — 80061 LIPID PANEL: CPT | Performed by: NURSE PRACTITIONER

## 2021-11-18 PROCEDURE — 86140 C-REACTIVE PROTEIN: CPT | Performed by: NURSE PRACTITIONER

## 2021-11-18 PROCEDURE — 82306 VITAMIN D 25 HYDROXY: CPT | Performed by: NURSE PRACTITIONER

## 2021-11-18 PROCEDURE — 86618 LYME DISEASE ANTIBODY: CPT | Performed by: NURSE PRACTITIONER

## 2021-11-18 PROCEDURE — 84443 ASSAY THYROID STIM HORMONE: CPT | Performed by: NURSE PRACTITIONER

## 2021-11-18 PROCEDURE — 93000 ELECTROCARDIOGRAM COMPLETE: CPT | Performed by: NURSE PRACTITIONER

## 2021-11-18 PROCEDURE — 85027 COMPLETE CBC AUTOMATED: CPT | Performed by: NURSE PRACTITIONER

## 2021-11-18 PROCEDURE — 82607 VITAMIN B-12: CPT | Performed by: NURSE PRACTITIONER

## 2021-11-18 PROCEDURE — 82746 ASSAY OF FOLIC ACID SERUM: CPT | Performed by: NURSE PRACTITIONER

## 2021-11-18 PROCEDURE — 99000 SPECIMEN HANDLING OFFICE-LAB: CPT | Performed by: NURSE PRACTITIONER

## 2021-11-18 PROCEDURE — 83735 ASSAY OF MAGNESIUM: CPT | Performed by: NURSE PRACTITIONER

## 2021-11-18 PROCEDURE — 99396 PREV VISIT EST AGE 40-64: CPT | Performed by: NURSE PRACTITIONER

## 2021-11-18 PROCEDURE — 99213 OFFICE O/P EST LOW 20 MIN: CPT | Mod: 25 | Performed by: NURSE PRACTITIONER

## 2021-11-18 PROCEDURE — 71046 X-RAY EXAM CHEST 2 VIEWS: CPT | Mod: FY | Performed by: RADIOLOGY

## 2021-11-18 PROCEDURE — 84425 ASSAY OF VITAMIN B-1: CPT | Mod: 90 | Performed by: NURSE PRACTITIONER

## 2021-11-18 PROCEDURE — 80053 COMPREHEN METABOLIC PANEL: CPT | Performed by: NURSE PRACTITIONER

## 2021-11-18 ASSESSMENT — ANXIETY QUESTIONNAIRES
GAD7 TOTAL SCORE: 2
4. TROUBLE RELAXING: SEVERAL DAYS
5. BEING SO RESTLESS THAT IT IS HARD TO SIT STILL: NOT AT ALL
3. WORRYING TOO MUCH ABOUT DIFFERENT THINGS: SEVERAL DAYS
7. FEELING AFRAID AS IF SOMETHING AWFUL MIGHT HAPPEN: NOT AT ALL
2. NOT BEING ABLE TO STOP OR CONTROL WORRYING: NOT AT ALL
GAD7 TOTAL SCORE: 2
8. IF YOU CHECKED OFF ANY PROBLEMS, HOW DIFFICULT HAVE THESE MADE IT FOR YOU TO DO YOUR WORK, TAKE CARE OF THINGS AT HOME, OR GET ALONG WITH OTHER PEOPLE?: SOMEWHAT DIFFICULT
1. FEELING NERVOUS, ANXIOUS, OR ON EDGE: NOT AT ALL
6. BECOMING EASILY ANNOYED OR IRRITABLE: NOT AT ALL
7. FEELING AFRAID AS IF SOMETHING AWFUL MIGHT HAPPEN: NOT AT ALL
GAD7 TOTAL SCORE: 2

## 2021-11-18 ASSESSMENT — MIFFLIN-ST. JEOR: SCORE: 1600.21

## 2021-11-18 ASSESSMENT — PAIN SCALES - GENERAL: PAINLEVEL: MODERATE PAIN (4)

## 2021-11-18 NOTE — LETTER
[facility]  [address]  [facility] [address]  Phone: [phone]    11/18/21    Pecry Curtis  [address]  [facility] [address]      To whom it may concern:     Nabor was seen in clinic today for ongoing post COVID symptoms. Please excuse him from missed work periodically as needed for flares. He also will need more rest periods throughout his day for impaired breathing and concentration.     Sincerely,      SHAILESH Leavitt CNP

## 2021-11-18 NOTE — PATIENT INSTRUCTIONS
Our Clinic hours are:  Mondays    7:20 am - 7 pm  Tues - Fri  7:20 am - 5 pm    Clinic Phone: [phone]    The clinic lab opens at 7:30 am Mon - Fri and appointments are required.    [facility]. [phone]  Monday  8 am - 7pm  Tues - Fri 8 am - 5:30 pm         Patient Education     Mediterranean Diet  A heart healthy eating plan  The Mediterranean Diet is based on the eating habits of people in countries near the [place]. People living in this part of the world have long lives and low rates of chronic diseases. They have lower rates of death from heart disease, cancer and other illnesses.  When you follow this eating plan you'll have better control of your blood sugar and weight. The plan requires simple changes in your habits of eating, and the whole family can take part.  Mediterranean lifestyle   Enjoy eating with others. Sit at the table with family and friends and enjoy your meal. When you eat slowly, you are able to tune in to your body's hunger and fullness signals. You're less likely to overeat.  Be physically active: Being active every day is important for overall good health. Run, walk, dance and do lighter activities such as house and yard work. Move more and sit less!  Drink plenty of water during the day.  Drink red wine with meals in modest amounts (optional).  The Mediterranean Pyramid   The pyramid shows the food groups and the amounts eaten in relation to the whole diet. It is more than a diet; it is also a life-style plan.  The largest food group at bottom contains plant foods: vegetables, fruits, grains, nuts, legumes, seeds, olives and olive oil. These foods make up the largest part of your meals.   The next groups above: fish and seafood, then poultry, cheese, eggs and yogurt are eaten less often and in smaller servings.   The top group, meats and sweets, are eaten the least often and in the smallest amounts.    Tips for adding plant foods to your  "meals   Vegetables and fruits:     Aim for 3 to 8 servings each day. A serving is   to 2 cups, depending on the food.    Choose a variety of colors. Big green salads are a great way to include several vegetable servings.    Try fresh fruit as a dessert: oranges, grapes, apples pomegranates or fresh figs.    At home keep fresh fruit in a bowl to tempt family.    Bring fruit and vegetables to work for a snack.  Oil     Replace butter and margarine with healthy fats such as olive oil. Other plant-based oils are canola, walnut and peanut oil. These are high in good fats. Use them in cooking, salad dressings and baking.    Drizzle your bread with olive oil instead of butter or margarine. Use herbs and spices to add flavor and aroma and to reduce fat and salt when cooking.  Whole grains    Look for the term \"whole\" or \"whole grain\" on the package. Processing grains removes vitamins, minerals and fiber. Whole grains may include: corn, wheat, oats, rye, rice and barley.    Examples of Mediterranean grains include: barley, farro, buckwheat, bulgur, couscous, and wheatberries.    Slowly switch to a whole grain by using whole-grain blends of pastas and rice. Or mix whole grains with refined; for example, mix whole-wheat pasta with white pasta.  Beans and legumes     Beans are a good source of protein and fiber, adding flavor and texture to dishes. Examples include cannellini beans, chickpea, ankita beans, green beans, kidney beans, lentils and split peas.    Cook a vegetarian meal one night a week: use beans or legumes with vegetables and grains.    Nuts are high in healthy fats. Try walnuts, almonds, pine nuts, hazelnuts and cashews. Avoid candied, honey-roasted and heavily salted nuts.    Limit your intake of nuts to a small handful each day. Explore ways to add to nuts to salads and other dishes.  Tips for using fish and seafood in your meals    Aim for meals with fish or shellfish at least twice a week.    Tuna, herring, " "salmon and sardines are rich in heart-healthy omega-3. Shellfish, such as mussels, oysters and clams, have similar benefits for brain and heart health.  Tips for using poultry, eggs and cheese and yogurt in your meals    Eat poultry or eggs at least twice a week.    Roast, broil or grill your poultry. Season with fresh or dried herbs.    Enjoy low-fat cheese or yogurt every day.  Tips for using red meat and sweets in your meals     Limit lean red meat to one time a week or 4 times a month.    Red meat has more saturated fats. Choose a lean cut, like top loin, sirloin, flank steak, strip steak or 90% lean ground beef.    Limit portions to 3 to 4 ounces.    Make whole grains and vegetables the main focus of a meal. Add meat in small amounts for flavor.    Limit sweets such as ice cream or cookies for a special times or holidays.  Snacks    Snack on a handful of almonds, walnuts or sunflower seeds in place of chips, cookies or other processed snack foods.    Calcium-rich, low-fat cheese or low- and nonfat plain yogurt with fresh fruit are healthy snacks that are easy to take with you.  Like to know more?  For tips on shoppping, cooking and eating well the Mediterranean way, go to the [employer] website at www.[employer] [place].org.  For informational purposes only. Not to replace the advice of your health care provider.   Copyright   2014 [facility]. All rights reserved.   Mediterranean pyramrid used with permission of the [employer]. [employer] 380040 - 09/15.           Patient Education   Coping with Life after COVID-19  Being in the hospital because of COVID-19 is scary. Going home can be scary, too. You may face changes to your life, the way you work or what you can eat.   It's hard to adjust to change, and it's normal to feel afraid, frustrated or even angry. These feelings usually go away over time. If your feelings don't start to get better, it's called \"adjustment disorder.\"   What signs should I " "look out for?  Adjustment disorder can happen to anyone in a time of stress. It makes it hard to cope with daily life. You may feel lonely or fight with loved ones, even if you're glad to be home.   Watch for these signs:    Fear or worry    Hard time focusing    Sadness or anger    Trouble talking to family or friends    Feeling like you don't fit in or isolating yourself    Problems with sleep    Drinking alcohol or taking drugs to cope  What can I do?  You can help yourself get better. Feeling you have control helps you move forward. You may wonder if you'll be able to do things you did before. Be patient. Do your best to make the most of every day. Try to build relationships, be as active as you can, eat right and keep a sense of humor. Avoid smoking and drinking too much alcohol.   Call your family doctor or clinic if you're not sure what to do. They can guide you to care or other services.  When should I get help?  Think about getting counseling if your sadness or frustration gets worse. Together with a trained counselor, you can talk about your problems adjusting to life after your hospital stay. You can come up with new ways to handle changes that give you more control.   Get help if you're thinking about hurting yourself. If you need help right away, call 911 or go to the nearest emergency room. You can also try the Crisis Text Line.  Crisis Text Line: 151-753 (http://www.crisistextline.org)  The Crisis Text Line serves anyone, in any crisis. It gives free, 24/7 support. Here's how it works:  1. Text HOME to 016571 from anywhere in the [place], anytime, about any type of crisis.  2. A live, trained Crisis Counselor will text you back quickly.  3. The volunteer Crisis Counselor can help you move from a \"hot moment\" to a \"cool moment.\" They can also help you work out a safety plan.  For informational purposes only. Not to replace the advice of your health care provider. Copyright   2020 [employer]. " All rights reserved. Clinically reviewed by the Ambulatory COVID-19 Care Map Team. [employer] 782229 - 04/20.

## 2021-11-18 NOTE — LETTER
[facility]  [address]  [facility] [address]  Phone: [phone]    11/18/21    Percy Curtis  [address]  [facility] [address]      To whom it may concern:     Nabor was seen in clinic today for ongoing post COVID symptoms. Please excuse him from missed work periodically as needed for flares.    Sincerely,      SHAILESH Leavitt CNP

## 2021-11-18 NOTE — PROGRESS NOTES
SUBJECTIVE:   CC: Percy Curtis is an 46 year old male who presents for preventative health visit.       Patient has been advised of split billing requirements and indicates understanding: Yes  Imm/Inj    Healthy Habits:    Getting at least 3 servings of Calcium per day:  NO    Bi-annual eye exam:  NO    Dental care twice a year:  NO    Sleep apnea or symptoms of sleep apnea:  None    Diet:  Regular (no restrictions)    Frequency of exercise:  None    Duration of exercise:  N/A    Taking medications regularly:  0    Barriers to taking medications:  Not applicable    Medication side effects:  Not applicable    PHQ-2 Total Score:    Additional concerns today:  Yes  History of Present Illness       Mental Health Follow-up:  Patient presents to follow-up on Anxiety.    Patient's anxiety since last visit has been:  Medium  The patient is having other symptoms associated with anxiety.  Any significant life events: health concerns  Patient is not feeling anxious or having panic attacks.  Patient has no concerns about alcohol or drug use.     Social History  Tobacco Use    Smoking status: Never Smoker    Smokeless tobacco: Never Used  Alcohol use: Yes    Comment: A few drinks per week, mostly beer;  Drug use: Never      Today's PHQ-9         PHQ-9 Total Score:         PHQ-9 Q9 Thoughts of better off dead/self-harm past 2 weeks :       Thoughts of suicide or self harm:      Self-harm Plan:        Self-harm Action:          Safety concerns for self or others:           He eats 2-3 servings of fruits and vegetables daily.He consumes 0 sweetened beverage(s) daily.He exercises with enough effort to increase his heart rate 9 or less minutes per day.  He exercises with enough effort to increase his heart rate 3 or less days per week.   He is taking medications regularly.  He is not taking prescribed medications regularly due to Not applicable.    Chief Complaint   Patient presents with     Physical     Establish Care      Imm/Inj     declined flu and covid vaccine     Covid Concern     Patient had covid in Sept and is having postcovid troubles. Patient is still having shortness breath, chest muscles pain and a sharp pain in right upper lung when taking a deep breath.        ADDITIONAL PROVIDER NOTES:   Dx with COVID 9/28/21. Admitted to the hospital 10/4/21- 10/10/21 for hypoxia. Was discharged on Eliquis for 1 month.  Evaluated in ER 10/30/21 for ankle swelling- DVT was ruled out. Since illness Nabor reports waxing and waning symptoms including fatigue, muscle aches, joint aches, nail bed changes, anxiety, palpitations, dyspnea on exertion, pleuritic pain, worsening rash on scalp and beard. Denies fever, productive cough, swelling in lower legs or joints.       Today's PHQ-2 Score:   PHQ-2 ( 1999 Pfizer) 11/18/2021   Q1: Little interest or pleasure in doing things 0   Q2: Feeling down, depressed or hopeless 0   PHQ-2 Score 0   Q1: Little interest or pleasure in doing things Not at all   Q2: Feeling down, depressed or hopeless Not at all   PHQ-2 Score Incomplete       Abuse: Current or Past(Physical, Sexual or Emotional)- No  Do you feel safe in your environment? Yes    Have you ever done Advance Care Planning? (For example, a Health Directive, POLST, or a discussion with a medical provider or your loved ones about your wishes): Yes, advance care planning is on file.    Social History     Tobacco Use     Smoking status: Never Smoker     Smokeless tobacco: Never Used   Substance Use Topics     Alcohol use: Yes     Comment: A few drinks per week, mostly beer;     If you drink alcohol do you typically have >3 drinks per day or >7 drinks per week? No    No flowsheet data found.No flowsheet data found.    Last PSA: No results found for: PSA    Reviewed orders with patient. Reviewed health maintenance and updated orders accordingly - Yes  Labs reviewed in EPIC  BP Readings from Last 3 Encounters:   11/18/21 124/86   10/30/21 124/87  "  10/10/21 93/57    Wt Readings from Last 3 Encounters:   11/18/21 76.7 kg (169 lb)   10/30/21 77.3 kg (170 lb 6.7 oz)   10/09/21 73.3 kg (161 lb 9.6 oz)                    Reviewed and updated as needed this visit by clinical staff  Tobacco  Allergies  Meds   Med Hx  Surg Hx  Fam Hx  Soc Hx       Reviewed and updated as needed this visit by Provider                   Review of Systems  CONSTITUTIONAL: NEGATIVE for fever, chills, change in weight  INTEGUMENTARY/SKIN: NEGATIVE for worrisome rashes, moles or lesions  EYES: NEGATIVE for vision changes or irritation  ENT: NEGATIVE for ear, mouth and throat problems  RESP:POSITIVE for dyspnea on exertion and Hx of COVID  CV: POSITIVE for dyspnea on exertion, palpitations and tachycardia  GI: NEGATIVE for nausea, abdominal pain, heartburn, or change in bowel habits   male: negative for dysuria, hematuria, decreased urinary stream, erectile dysfunction, urethral discharge  MUSCULOSKELETAL:POSITIVE  for arthralgias, muscle weakness - generalized and myalgia  NEURO: POSITIVE for paresthesias - intermittent face  PSYCHIATRIC: POSITIVE foranxiety and fatigue    OBJECTIVE:   /86   Pulse 107   Temp 97.3  F (36.3  C) (Tympanic)   Resp 18   Ht 1.702 m (5' 7\")   Wt 76.7 kg (169 lb)   SpO2 97%   BMI 26.47 kg/m      Physical Exam  GENERAL: healthy, alert and no distress  EYES: Eyes grossly normal to inspection  HENT: ear canals and TM's normal, nose and mouth without ulcers or lesions  NECK: no adenopathy, no asymmetry, masses, or scars and thyroid normal to palpation  RESP: lungs clear to auscultation - no rales, rhonchi or wheezes  CV: regular rates and rhythm, normal S1 S2, no S3 or S4, no murmur, click or rub and no peripheral edema  ABDOMEN: soft, nontender, no hepatosplenomegaly, no masses and bowel sounds normal  MS: no gross musculoskeletal defects noted, no edema  SKIN: no suspicious lesions or rashes, nail beds with horizontal hypopigmented line  NEURO: " Normal strength and tone, mentation intact and speech normal  PSYCH: mentation appears normal, affect normal/bright    Diagnostic Test Results:  Labs reviewed in Epic    ASSESSMENT/PLAN:   Percy was seen today for physical, establish care, imm/inj and covid concern.    Diagnoses and all orders for this visit:    Routine physical examination    Atypical chest pain  -     XR Chest 2 Views; Future  -     EKG 12-lead complete w/read - Clinics    CARDIOVASCULAR SCREENING; LDL GOAL LESS THAN 130  -     Lipid panel reflex to direct LDL Non-fasting; Future  -     Lipid panel reflex to direct LDL Non-fasting    Post-COVID-19 syndrome manifesting as chronic palpitations  -     CRP inflammation; Future  -     TSH with free T4 reflex; Future  -     CRP inflammation  -     TSH with free T4 reflex    Post-COVID-19 syndrome manifesting as chronic joint pain  -     CRP inflammation; Future  -     TSH with free T4 reflex; Future  -     Lyme Disease Radha with reflex to WB Serum; Future  -     Comprehensive metabolic panel; Future  -     CBC with platelets; Future  -     Magnesium; Future  -     CRP inflammation  -     TSH with free T4 reflex  -     Lyme Disease Radha with reflex to WB Serum  -     Comprehensive metabolic panel  -     CBC with platelets  -     Magnesium    Nail discoloration  -     TSH with free T4 reflex; Future  -     CBC with platelets; Future  -     Vitamin B12; Future  -     Folate; Future  -     Vitamin B1 whole blood; Future  -     Vitamin D Deficiency; Future  -     TSH with free T4 reflex  -     CBC with platelets  -     Vitamin B12  -     Folate  -     Vitamin B1 whole blood  -     Vitamin D Deficiency      Time spent in chart review in preparation to see patient, time with patient for interview/exam, ordering medications/tests/and/or procedures, and time spent in charting and coordinating care: 45 minutes.       Patient has been advised of split billing requirements and indicates understanding:  "Yes  COUNSELING:   Reviewed preventive health counseling, as reflected in patient instructions    Estimated body mass index is 26.47 kg/m  as calculated from the following:    Height as of this encounter: 1.702 m (5' 7\").    Weight as of this encounter: 76.7 kg (169 lb).     Weight management plan: Discussed healthy diet and exercise guidelines    He reports that he has never smoked. He has never used smokeless tobacco.      Counseling Resources:  ATP IV Guidelines  Pooled Cohorts Equation Calculator  FRAX Risk Assessment  ICSI Preventive Guidelines  Dietary Guidelines for Americans, 2010  USDA's MyPlate  ASA Prophylaxis  Lung CA Screening    SHAILESH Leavitt CNP  [facility]  Answers for HPI/ROS submitted by the patient on 11/18/2021  DERREK 7 TOTAL SCORE: 2      "

## 2021-11-19 ENCOUNTER — MYC MEDICAL ADVICE (OUTPATIENT)
Dept: FAMILY MEDICINE | Facility: CLINIC | Age: 46
End: 2021-11-19
Payer: COMMERCIAL

## 2021-11-19 DIAGNOSIS — R00.0 TACHYCARDIA: ICD-10-CM

## 2021-11-19 DIAGNOSIS — H93.12 TINNITUS, LEFT: Primary | ICD-10-CM

## 2021-11-19 ASSESSMENT — ANXIETY QUESTIONNAIRES: GAD7 TOTAL SCORE: 2

## 2021-11-19 NOTE — RESULT ENCOUNTER NOTE
[facility]    Labs are all unremarkable so far. The blood sugar and triglycerides levels are secondary to not being a fasting test- no worries. Still waiting for the vitamin B1 level. Please let us know if you have any questions.     Take care,    SHAILESH Bazan CNP

## 2021-11-23 LAB — VIT B1 PYROPHOSHATE BLD-SCNC: 108 NMOL/L

## 2021-11-23 NOTE — RESULT ENCOUNTER NOTE
Melida Greer    Your vitamin B1 results came back within normal limits. Please let us know if you have any questions.     Take care,    SHAILESH Bazan CNP

## 2021-12-12 ENCOUNTER — HEALTH MAINTENANCE LETTER (OUTPATIENT)
Age: 46
End: 2021-12-12

## 2021-12-15 ENCOUNTER — HOSPITAL ENCOUNTER (OUTPATIENT)
Dept: CARDIOLOGY | Facility: CLINIC | Age: 46
Discharge: HOME | End: 2021-12-15
Attending: NURSE PRACTITIONER | Admitting: NURSE PRACTITIONER
Payer: COMMERCIAL

## 2021-12-15 DIAGNOSIS — R00.0 TACHYCARDIA: ICD-10-CM

## 2021-12-15 PROCEDURE — 93242 EXT ECG>48HR<7D RECORDING: CPT

## 2021-12-15 PROCEDURE — 93244 EXT ECG>48HR<7D REV&INTERPJ: CPT | Performed by: INTERNAL MEDICINE

## 2021-12-30 NOTE — PROGRESS NOTES
Chief Complaint   Patient presents with     Ear Problem     tinitus for last 2 years and swishing sound recently      History of Present Illness  Percy Curtis is a 46 year old male who presents today for evaluation.  I am seeing this patient in consultation for left tinnitus at the request of the provider Mandy Hay CNP. The patient developed Covid in September 2021 and had significant illness with hospitalization for multiple days.  When he returned home from the hospital, he started noticing pulsatile tinnitus in the left ear only.  This is intermittent and was initially quite severe.  The severity has slowly improved over the past month or so.  He was noting that if he pressed in the posterior mastoid area or upper neck, the pulsation would improve.  He does have a history of noise exposure history with some firearm use and loud music exposure.  He currently denies any otalgia, otorrhea, bloody otorrhea.  No dizziness or adilson vertigo.  He did have ear tubes as a child but no other significant history of ear surgery.  He did have a recent hemoglobin and TSH on 12/11/2021 that were within normal limits.      Past Medical History  Patient Active Problem List   Diagnosis     Acute respiratory failure with hypoxia (H)     Pneumonia due to 2019 novel coronavirus     LFT elevation     Hypokalemia     Current Medications     Current Outpatient Medications:      acetylcysteine (NAC) 600 MG CAPS capsule, Take by mouth daily, Disp: , Rfl:      Ascorbic Acid (VITAMIN C) 500 MG CAPS, Take 1 capsule by mouth daily, Disp: , Rfl:      biotin 1000 MCG TABS tablet, Take 1,000 mcg by mouth daily, Disp: , Rfl:      cholecalciferol (VITAMIN D3) 125 mcg (5000 units) capsule, Take 125 mcg by mouth daily, Disp: , Rfl:      Multiple Vitamin (MULTIVITAMIN ADULT PO), Take 1 tablet by mouth daily, Disp: , Rfl:      zinc sulfate (ZINC-220) 220 (50 Zn) MG capsule, Take 220 mg by mouth daily, Disp: , Rfl:      acetaminophen  (TYLENOL) 500 MG tablet, Take 1,000 mg by mouth every 6 hours as needed for mild pain, Disp: , Rfl:     Allergies  Allergies   Allergen Reactions     Food      watermelon, almonds, walnuts.     Penicillins Hives and Rash     Lumps on tongue       Social History   Social History     Socioeconomic History     Marital status:      Spouse name: Not on file     Number of children: Not on file     Years of education: Not on file     Highest education level: Not on file   Occupational History     Not on file   Tobacco Use     Smoking status: Never Smoker     Smokeless tobacco: Never Used   Vaping Use     Vaping Use: Never used   Substance and Sexual Activity     Alcohol use: Yes     Comment: A few drinks per week, mostly beer;     Drug use: Never     Sexual activity: Yes     Partners: Female   Other Topics Concern     Not on file   Social History Narrative     Not on file     Social Determinants of Health     Financial Resource Strain: Not on file   Food Insecurity: Not on file   Transportation Needs: Not on file   Physical Activity: Not on file   Stress: Not on file   Social Connections: Not on file   Intimate Partner Violence: Not on file   Housing Stability: Not on file       Family History  Family History   Problem Relation Age of Onset     Heart Disease Father      Aneurysm Father      Polycystic Kidney Diease Father      Atrial fibrillation Father      LUNG DISEASE Brother      Polycystic Kidney Diease Brother        Review of Systems  As per HPI and PMHx, otherwise 10+ comprehensive system review is negative.    Physical Exam  /71 (BP Location: Left arm, Patient Position: Chair, Cuff Size: Adult Regular)   Pulse 105   Temp 97.4  F (36.3  C) (Tympanic)   Wt 76.7 kg (169 lb)   BMI 26.47 kg/m    GENERAL: Patient is a pleasant, cooperative 46 year old male in no acute distress.  HEAD: Normocephalic, atraumatic.  Hair and scalp are normal.  EYES: Pupils are equal, round, reactive to light and  accommodation.  Extraocular movements are intact.  The sclera nonicteric without injection.  The extraocular structures are normal.  EARS: Normal shape and symmetry.  No tenderness when palpating the mastoid or tragal areas bilaterally.  Otoscopic exam on the right reveals a clear canal.  The right tympanic membrane is intact with a slight amount of erythema and mild retraction.  No evidence of middle ear effusion, granulation, drainage, or evidence of cholesteatoma.  Otoscope exam the left reveals a clear canal.  Left tympanic membrane is round, intact without evidence effusion, good landmarks.  No retraction, granulation, drainage, or evidence of cholesteatoma.   NEUROLOGIC: Cranial nerves II through XII are grossly intact.  Voice is strong.  Facial nerve examination incomplete due to the patient wearing a mask.  CARDIOVASCULAR: Extremities are warm and well-perfused.  No significant peripheral edema.  RESPIRATORY: Patient has nonlabored breathing without cough, wheeze, stridor.  PSYCHIATRIC: Patient is alert and oriented.  Mood and affect appear normal.  SKIN: Warm and dry.  No scalp, face, or neck lesions noted.    Audiogram  The patient underwent an audiogram performed today.  My review of the audiogram shows normal hearing to 2000 Hz sloping to mild sloping to moderate sloping to mild sensorineural hearing loss in the right ear and normal to 3000 Hz sloping to mild sensorineural hearing loss sloping to normal hearing in the left ear.  Pure-tone average is 5 dB on the right and 3 dB on the left.  Speech reception threshold is 5 dB on the right and 10 dB on the left.  The patient had 100% word recognition on the right and 100% word recognition on the left.  The patient had a type A tympanogram on the right and a type A deep tympanogram on the left.     Assessment and Plan     ICD-10-CM    1. Bilateral high frequency sensorineural hearing loss  H90.3 Adult Audiology Referral     MR Brain w/o & w Contrast     MRA  Brain ([place]) wo & w Contrast   2. Asymmetrical sensorineural hearing loss  H90.3    3. Pulsatile tinnitus, left ear  H93.A2 Adult Audiology Referral     MR Brain w/o & w Contrast     MRA Brain ([place]) wo & w Contrast   4. Tinnitus, bilateral  H93.13 Adult Audiology Referral     MR Brain w/o & w Contrast     MRA Brain ([place]) wo & w Contrast   5. History of 2019 novel coronavirus disease (COVID-19)  Z86.16 Adult Audiology Referral     MR Brain w/o & w Contrast     MRA Brain ([place]) wo & w Contrast     It was my pleasure seeing Percy Curtis today in clinic.  The patient presents to me today with recent onset of pulsatile tinnitus after acute illness with COVID-19 in September 2021.  He has had some slight improvement in his symptoms but continues to have pulsatility.  He also has some background subjective tinnitus bilaterally.  His audiogram suggest some asymmetric sensorineural hearing loss in the right ear.  I think given the onset of pulsatile tinnitus and his asymmetric sensorineural hearing loss, we should obtain an MRI of the brain and brainstem with IAC protocol.  We can also perform an MR angiogram at that time.  If his imaging is within normal limits, we could discuss close observation of his pulsatility if it continues to improve.  We also discussed a short trial of acetazolamide for pulsatile tinnitus which has been shown to show some improvement in patients with  idiopathic pulsatile tinnitus.  I will contact the patient with the results of his imaging when available.     We spent the remainder of today's visit on education. We discussed steps that can be taken to mask the noise, such as a low volume de-tuned radio, a fan in the background, and/or hearing aids.  Correlation with stress, anxiety, and depression were also discussed.  The patient was also cautioned on the numerous expensive non-pharmaceutical options that are advertised, and have no  proven benefit.    Joe Dumas MD  Department of Otolaryngology-Head and Neck Surgery  [facility]

## 2022-01-05 ENCOUNTER — OFFICE VISIT (OUTPATIENT)
Dept: AUDIOLOGY | Facility: CLINIC | Age: 47
End: 2022-01-05
Payer: COMMERCIAL

## 2022-01-05 ENCOUNTER — OFFICE VISIT (OUTPATIENT)
Dept: OTOLARYNGOLOGY | Facility: CLINIC | Age: 47
End: 2022-01-05
Payer: COMMERCIAL

## 2022-01-05 VITALS
DIASTOLIC BLOOD PRESSURE: 71 MMHG | WEIGHT: 169 LBS | HEART RATE: 105 BPM | TEMPERATURE: 97.4 F | SYSTOLIC BLOOD PRESSURE: 133 MMHG | BODY MASS INDEX: 26.47 KG/M2

## 2022-01-05 DIAGNOSIS — H93.A2 PULSATILE TINNITUS, LEFT EAR: ICD-10-CM

## 2022-01-05 DIAGNOSIS — H90.3 ASYMMETRICAL SENSORINEURAL HEARING LOSS: ICD-10-CM

## 2022-01-05 DIAGNOSIS — H93.13 TINNITUS, BILATERAL: ICD-10-CM

## 2022-01-05 DIAGNOSIS — H90.3 BILATERAL HIGH FREQUENCY SENSORINEURAL HEARING LOSS: Primary | ICD-10-CM

## 2022-01-05 DIAGNOSIS — H93.13 TINNITUS OF BOTH EARS: Primary | ICD-10-CM

## 2022-01-05 DIAGNOSIS — Z86.16 HISTORY OF 2019 NOVEL CORONAVIRUS DISEASE (COVID-19): ICD-10-CM

## 2022-01-05 PROCEDURE — 92557 COMPREHENSIVE HEARING TEST: CPT | Performed by: AUDIOLOGIST

## 2022-01-05 PROCEDURE — 99203 OFFICE O/P NEW LOW 30 MIN: CPT | Performed by: OTOLARYNGOLOGY

## 2022-01-05 PROCEDURE — 99207 PR NO CHARGE LOS: CPT | Performed by: AUDIOLOGIST

## 2022-01-05 PROCEDURE — 92550 TYMPANOMETRY & REFLEX THRESH: CPT | Performed by: AUDIOLOGIST

## 2022-01-05 RX ORDER — ZINC SULFATE 50(220)MG
220 CAPSULE ORAL DAILY
COMMUNITY
End: 2022-05-03

## 2022-01-05 RX ORDER — MULTIVIT-MIN/IRON/FOLIC ACID/K 18-600-40
1 CAPSULE ORAL DAILY
COMMUNITY

## 2022-01-05 RX ORDER — BIOTIN 1 MG
1000 TABLET ORAL DAILY
COMMUNITY
End: 2023-11-01

## 2022-01-05 ASSESSMENT — PAIN SCALES - GENERAL: PAINLEVEL: NO PAIN (0)

## 2022-01-05 NOTE — NURSING NOTE
"Initial /71 (BP Location: Left arm, Patient Position: Chair, Cuff Size: Adult Regular)   Pulse 105   Temp 97.4  F (36.3  C) (Tympanic)   Wt 76.7 kg (169 lb)   BMI 26.47 kg/m   Estimated body mass index is 26.47 kg/m  as calculated from the following:    Height as of 11/18/21: 1.702 m (5' 7\").    Weight as of this encounter: 76.7 kg (169 lb). .    Isis You LPN    "

## 2022-01-05 NOTE — LETTER
1/5/2022         RE: Percy Curtis  [address] [address] [place] [facility] [address]        Dear Colleague,    Thank you for referring your patient, Percy Curtis, to the [facility]. Please see a copy of my visit note below.    Chief Complaint   Patient presents with     Ear Problem     tinitus for last 2 years and swishing sound recently      History of Present Illness  Percy Curtis is a 46 year old male who presents today for evaluation.  I am seeing this patient in consultation for left tinnitus at the request of the provider Mandy Hay CNP. The patient developed Covid in September 2021 and had significant illness with hospitalization for multiple days.  When he returned home from the hospital, he started noticing pulsatile tinnitus in the left ear only.  This is intermittent and was initially quite severe.  The severity has slowly improved over the past month or so.  He was noting that if he pressed in the posterior mastoid area or upper neck, the pulsation would improve.  He does have a history of noise exposure history with some firearm use and loud music exposure.  He currently denies any otalgia, otorrhea, bloody otorrhea.  No dizziness or adilson vertigo.  He did have ear tubes as a child but no other significant history of ear surgery.  He did have a recent hemoglobin and TSH on 12/11/2021 that were within normal limits.      Past Medical History  Patient Active Problem List   Diagnosis     Acute respiratory failure with hypoxia (H)     Pneumonia due to 2019 novel coronavirus     LFT elevation     Hypokalemia     Current Medications     Current Outpatient Medications:      acetylcysteine (NAC) 600 MG CAPS capsule, Take by mouth daily, Disp: , Rfl:      Ascorbic Acid (VITAMIN C) 500 MG CAPS, Take 1 capsule by mouth daily, Disp: , Rfl:      biotin 1000 MCG TABS tablet, Take 1,000 mcg by mouth daily, Disp: , Rfl:      cholecalciferol (VITAMIN D3) 125 mcg (5000 units)  capsule, Take 125 mcg by mouth daily, Disp: , Rfl:      Multiple Vitamin (MULTIVITAMIN ADULT PO), Take 1 tablet by mouth daily, Disp: , Rfl:      zinc sulfate (ZINC-220) 220 (50 Zn) MG capsule, Take 220 mg by mouth daily, Disp: , Rfl:      acetaminophen (TYLENOL) 500 MG tablet, Take 1,000 mg by mouth every 6 hours as needed for mild pain, Disp: , Rfl:     Allergies  Allergies   Allergen Reactions     Food      watermelon, almonds, walnuts.     Penicillins Hives and Rash     Lumps on tongue       Social History   Social History     Socioeconomic History     Marital status:      Spouse name: Not on file     Number of children: Not on file     Years of education: Not on file     Highest education level: Not on file   Occupational History     Not on file   Tobacco Use     Smoking status: Never Smoker     Smokeless tobacco: Never Used   Vaping Use     Vaping Use: Never used   Substance and Sexual Activity     Alcohol use: Yes     Comment: A few drinks per week, mostly beer;     Drug use: Never     Sexual activity: Yes     Partners: Female   Other Topics Concern     Not on file   Social History Narrative     Not on file     Social Determinants of Health     Financial Resource Strain: Not on file   Food Insecurity: Not on file   Transportation Needs: Not on file   Physical Activity: Not on file   Stress: Not on file   Social Connections: Not on file   Intimate Partner Violence: Not on file   Housing Stability: Not on file       Family History  Family History   Problem Relation Age of Onset     Heart Disease Father      Aneurysm Father      Polycystic Kidney Diease Father      Atrial fibrillation Father      LUNG DISEASE Brother      Polycystic Kidney Diease Brother        Review of Systems  As per HPI and PMHx, otherwise 10+ comprehensive system review is negative.    Physical Exam  /71 (BP Location: Left arm, Patient Position: Chair, Cuff Size: Adult Regular)   Pulse 105   Temp 97.4  F (36.3  C) (Tympanic)    Wt 76.7 kg (169 lb)   BMI 26.47 kg/m    GENERAL: Patient is a pleasant, cooperative 46 year old male in no acute distress.  HEAD: Normocephalic, atraumatic.  Hair and scalp are normal.  EYES: Pupils are equal, round, reactive to light and accommodation.  Extraocular movements are intact.  The sclera nonicteric without injection.  The extraocular structures are normal.  EARS: Normal shape and symmetry.  No tenderness when palpating the mastoid or tragal areas bilaterally.  Otoscopic exam on the right reveals a clear canal.  The right tympanic membrane is intact with a slight amount of erythema and mild retraction.  No evidence of middle ear effusion, granulation, drainage, or evidence of cholesteatoma.  Otoscope exam the left reveals a clear canal.  Left tympanic membrane is round, intact without evidence effusion, good landmarks.  No retraction, granulation, drainage, or evidence of cholesteatoma.   NEUROLOGIC: Cranial nerves II through XII are grossly intact.  Voice is strong.  Facial nerve examination incomplete due to the patient wearing a mask.  CARDIOVASCULAR: Extremities are warm and well-perfused.  No significant peripheral edema.  RESPIRATORY: Patient has nonlabored breathing without cough, wheeze, stridor.  PSYCHIATRIC: Patient is alert and oriented.  Mood and affect appear normal.  SKIN: Warm and dry.  No scalp, face, or neck lesions noted.    Audiogram  The patient underwent an audiogram performed today.  My review of the audiogram shows normal hearing to 2000 Hz sloping to mild sloping to moderate sloping to mild sensorineural hearing loss in the right ear and normal to 3000 Hz sloping to mild sensorineural hearing loss sloping to normal hearing in the left ear.  Pure-tone average is 5 dB on the right and 3 dB on the left.  Speech reception threshold is 5 dB on the right and 10 dB on the left.  The patient had 100% word recognition on the right and 100% word recognition on the left.  The patient had a  type A tympanogram on the right and a type A deep tympanogram on the left.     Assessment and Plan     ICD-10-CM    1. Bilateral high frequency sensorineural hearing loss  H90.3 Adult Audiology Referral     MR Brain w/o & w Contrast     MRA Brain ([place]) wo & w Contrast   2. Asymmetrical sensorineural hearing loss  H90.3    3. Pulsatile tinnitus, left ear  H93.A2 Adult Audiology Referral     MR Brain w/o & w Contrast     MRA Brain ([place]) wo & w Contrast   4. Tinnitus, bilateral  H93.13 Adult Audiology Referral     MR Brain w/o & w Contrast     MRA Brain ([place]) wo & w Contrast   5. History of 2019 novel coronavirus disease (COVID-19)  Z86.16 Adult Audiology Referral     MR Brain w/o & w Contrast     MRA Brain ([place]) wo & w Contrast     It was my pleasure seeing Percy Curtis today in clinic.  The patient presents to me today with recent onset of pulsatile tinnitus after acute illness with COVID-19 in September 2021.  He has had some slight improvement in his symptoms but continues to have pulsatility.  He also has some background subjective tinnitus bilaterally.  His audiogram suggest some asymmetric sensorineural hearing loss in the right ear.  I think given the onset of pulsatile tinnitus and his asymmetric sensorineural hearing loss, we should obtain an MRI of the brain and brainstem with IAC protocol.  We can also perform an MR angiogram at that time.  If his imaging is within normal limits, we could discuss close observation of his pulsatility if it continues to improve.  We also discussed a short trial of acetazolamide for pulsatile tinnitus which has been shown to show some improvement in patients with  idiopathic pulsatile tinnitus.  I will contact the patient with the results of his imaging when available.     We spent the remainder of today's visit on education. We discussed steps that can be taken to mask the noise, such as a low volume de-tuned radio,  a fan in the background, and/or hearing aids.  Correlation with stress, anxiety, and depression were also discussed.  The patient was also cautioned on the numerous expensive non-pharmaceutical options that are advertised, and have no proven benefit.    Joe Dumas MD  Department of Otolaryngology-Head and Neck Surgery  [facility]         Again, thank you for allowing me to participate in the care of your patient.        Sincerely,        Joe Dumas MD

## 2022-01-05 NOTE — PROGRESS NOTES
"AUDIOLOGY REPORT:    Patient was referred from ENT by Dr. Dumas for audiology evaluation. The patient reports pulsatile tinnitus (described as a \"swishing\" sound) in the left ear that started after he had COVID in September 2021. It is in time with his heart. He notes that it is improving as his heart rate and blood pressure improve. He can temporarily stop the sound by pressing behind his ear. The patient also reports ringing tinnitus that has been present for a couple of years and is worse in the right ear than the left. The patient reports that he had PE tubes as a child, but otherwise has not had ear problems or ear surgery. He reports that he has long COVID. He reports that he usually does not have ear pain, but he felt like he was getting an ear infection in the left ear on 12/30/2021, and then the ear pain went away and he developed a headache. 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 The patient reports a family history of hearing loss with age.    Testing:    Otoscopy:   Otoscopic exam indicates ears are clear of cerumen bilaterally     Tympanograms:    RIGHT: normal eardrum mobility     LEFT:   hypercompliant eardrum mobility    Reflexes (reported by stimulus ear):  RIGHT: Ipsilateral is present at normal levels  RIGHT: Contralateral could not test, likely due to hypercompliance  LEFT:   Ipsilateral could not test, likely due to hypercompliance  LEFT:   Contralateral is present at elevated levels     Thresholds:   Pure Tone Thresholds assessed using conventional audiometry with good reliability from 250-8000 Hz bilaterally using insert earphones and circumaural headphones     RIGHT:  moderate to mild sensorineural " hearing loss at 4638-2871 Hz with normal hearing sensitivity at all other tested frequencies    LEFT:    mild sensorineural hearing loss at 4000 Hz with normal hearing sensitivity at all other tested frequencies    Speech Reception Threshold:    RIGHT: 5 dB HL    LEFT:   10 dB HL  Results are in agreement with pure tone average.     Word Recognition Score:     RIGHT: 100% at 55 dB HL using NU-6 recorded word list.    LEFT:   100% at 55 dB HL using NU-6 recorded word list.    Discussed results with the patient.     Patient was returned to ENT for follow up.     Lucy Worthy, CCC-A  Licensed Audiologist #[id number]  1/5/2022

## 2022-01-06 ENCOUNTER — HOSPITAL ENCOUNTER (OUTPATIENT)
Dept: MRI IMAGING | Facility: CLINIC | Age: 47
End: 2022-01-06
Attending: OTOLARYNGOLOGY
Payer: COMMERCIAL

## 2022-01-06 DIAGNOSIS — H90.3 BILATERAL HIGH FREQUENCY SENSORINEURAL HEARING LOSS: ICD-10-CM

## 2022-01-06 DIAGNOSIS — H93.A2 PULSATILE TINNITUS, LEFT EAR: ICD-10-CM

## 2022-01-06 DIAGNOSIS — Z86.16 HISTORY OF 2019 NOVEL CORONAVIRUS DISEASE (COVID-19): ICD-10-CM

## 2022-01-06 DIAGNOSIS — H93.13 TINNITUS, BILATERAL: ICD-10-CM

## 2022-01-06 PROCEDURE — 255N000002 HC RX 255 OP 636: Performed by: OTOLARYNGOLOGY

## 2022-01-06 PROCEDURE — 70553 MRI BRAIN STEM W/O & W/DYE: CPT

## 2022-01-06 PROCEDURE — 70544 MR ANGIOGRAPHY HEAD W/O DYE: CPT | Mod: XU

## 2022-01-06 PROCEDURE — A9585 GADOBUTROL INJECTION: HCPCS | Performed by: OTOLARYNGOLOGY

## 2022-01-06 RX ORDER — GADOBUTROL 604.72 MG/ML
7.5 INJECTION INTRAVENOUS ONCE
Status: COMPLETED | OUTPATIENT
Start: 2022-01-06 | End: 2022-01-06

## 2022-01-06 RX ADMIN — GADOBUTROL 7.5 ML: 604.72 INJECTION INTRAVENOUS at 16:53

## 2022-01-07 ENCOUNTER — TELEPHONE (OUTPATIENT)
Dept: OTOLARYNGOLOGY | Facility: CLINIC | Age: 47
End: 2022-01-07
Payer: COMMERCIAL

## 2022-01-07 DIAGNOSIS — H93.A2 PULSATILE TINNITUS, LEFT EAR: ICD-10-CM

## 2022-01-07 DIAGNOSIS — H90.3 BILATERAL HIGH FREQUENCY SENSORINEURAL HEARING LOSS: ICD-10-CM

## 2022-01-07 DIAGNOSIS — Q04.9 DURAL ARTERIOVENOUS MALFORMATION (H): Primary | ICD-10-CM

## 2022-01-07 DIAGNOSIS — H93.13 TINNITUS, BILATERAL: ICD-10-CM

## 2022-01-07 DIAGNOSIS — H90.3 ASYMMETRICAL SENSORINEURAL HEARING LOSS: ICD-10-CM

## 2022-01-07 NOTE — TELEPHONE ENCOUNTER
I spoke to the patient on the phone.  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  I have to have him meet with the neuro interventional list, neurosurgeon, or skull base surgeon to discuss further work-up and management.  I placed a neurosurgery referral.  I will send a message to the neuro otologist to see if they these in skull base clinic.    Joe Dumas MD  Department of Otolaryngology-Head and Neck Surgery  [facility]

## 2022-01-10 ENCOUNTER — TELEPHONE (OUTPATIENT)
Dept: NEUROSURGERY | Facility: CLINIC | Age: 47
End: 2022-01-10

## 2022-01-10 NOTE — RESULT ENCOUNTER NOTE
Melida Greer    Your heart monitor results came back within normal limits with a rare premature beat, no concerning or abnormal arrhythmias. Please let us know if you have any questions.     Take care,    SHAILESH Bazan CNP

## 2022-01-10 NOTE — TELEPHONE ENCOUNTER
[facility] Call Center    Phone Message    May a detailed message be left on voicemail: yes     Reason for Call: Appointment Intake  / Results  Referring Provider Name alicia ragland - ENT  Diagnosis and/or Symptoms: Dural arteriovenous malformation (H) [Q04.9]  Pulsatile tinnitus, left ear [H93.A2]  Tinnitus, bilateral [H93.13]  Asymmetrical sensorineural hearing loss [H90.3]  Bilateral high frequency sensorineural hearing loss [H90.3]        Patient asking for return call to schedule.   It doesn't look like referrals been reviewed by care team yet. Also, messages sent to [facility] team per referring provider but no one has contacted patient yet. Wanting to go over MRI/MRA results.      Action Taken: Other: NEUROSURGERY    Travel Screening: Not Applicable

## 2022-01-14 ENCOUNTER — CARE COORDINATION (OUTPATIENT)
Dept: NEUROSURGERY | Facility: CLINIC | Age: 47
End: 2022-01-14
Payer: COMMERCIAL

## 2022-01-14 NOTE — PROGRESS NOTES
Writer spoke with patient to inform with Dr. Herring on 01/25/22.   Yessica Jaquez RNCC approved for patient to see Dr. Herring.     Writer sent message to Neurosurgery Clinic Scheduling Team to have patient schedule for video visit. Patient needs last appointment of the day.      Rach Pugh LPN  Neurosurgery   ;

## 2022-01-14 NOTE — TELEPHONE ENCOUNTER
Patient calling again re referral. It has been an additional 4 days since he has called. He tried to give enough time and still no one has called and hes worried. Would like to be called to schedule, please.   Routing as high priority. Referral was submitted by referral source as patient needing to be seen: URGENT 3-5 days. Referral entered in system 1/7/22.

## 2022-01-24 ENCOUNTER — TELEPHONE (OUTPATIENT)
Dept: NEUROSURGERY | Facility: CLINIC | Age: 47
End: 2022-01-24
Payer: COMMERCIAL

## 2022-01-25 NOTE — TELEPHONE ENCOUNTER
FUTURE VISIT INFORMATION      FUTURE VISIT INFORMATION:    Date: 1/26/2022    Time: 930am    [place] [facility] [place]:    Referring provider:  Dr. Dumas    Referring providers clinic:  [facility]     Reason for visit/diagnosis  Dural Arteriovenous    RECORDS REQUESTED FROM:       Clinic name Comments Records Status Imaging Status   Internal Dr. Dumas-1/5/2022    MR Brain-1/6/2022    MRA Brain-1/6/2022 Epic PACS

## 2022-01-26 ENCOUNTER — TELEPHONE (OUTPATIENT)
Dept: NEUROSURGERY | Facility: CLINIC | Age: 47
End: 2022-01-26

## 2022-01-26 ENCOUNTER — PRE VISIT (OUTPATIENT)
Dept: NEUROSURGERY | Facility: CLINIC | Age: 47
End: 2022-01-26

## 2022-01-26 ENCOUNTER — VIRTUAL VISIT (OUTPATIENT)
Dept: NEUROSURGERY | Facility: CLINIC | Age: 47
End: 2022-01-26
Payer: COMMERCIAL

## 2022-01-26 DIAGNOSIS — I67.1 CEREBROVASCULAR DURAL AV FISTULA: ICD-10-CM

## 2022-01-26 DIAGNOSIS — I77.0 AVF (ARTERIOVENOUS FISTULA) (H): Primary | ICD-10-CM

## 2022-01-26 PROCEDURE — 99207 PR NO DOCUMENTATION ON VISIT: CPT | Performed by: NEUROLOGICAL SURGERY

## 2022-01-26 RX ORDER — MULTIVITAMIN WITH IRON
1 TABLET ORAL DAILY
COMMUNITY
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
## 2022-01-26 NOTE — TELEPHONE ENCOUNTER
Called patient to schedule surgery with Dr. Zapata  Date of Surgery: 2/23    Location of surgery: [facility]    Pre-Op H&P: NA    Imaging Scheduled:  No    Scheduled COVID-19 Testing: Yes [facility] 2/21      Additional comments: patient wanted date near the end of February, Scheduled 2/23 will call with any questions        Anna C. Schoenecker on 1/26/2022 at 1:15 PM
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
## 2022-01-26 NOTE — PATIENT INSTRUCTIONS
We will contact you to schedule your angiogram.    If you have any questions please contact me at [phone], option [phone].    Anton Woodson RN, CNRN  Stroke & Endovascular Care Coordinator    Thank you for choosing [facility] for your health care needs.
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
## 2022-02-04 DIAGNOSIS — Z11.59 ENCOUNTER FOR SCREENING FOR OTHER VIRAL DISEASES: Primary | ICD-10-CM

## 2022-02-07 ENCOUNTER — PATIENT OUTREACH (OUTPATIENT)
Dept: NEUROLOGY | Facility: CLINIC | Age: 47
End: 2022-02-07
Payer: COMMERCIAL

## 2022-02-07 NOTE — PROGRESS NOTES
Informed patient of procedure instructions. Confirmed date and time. Patient verbalized understanding. All questions answered. Map and patient instructions mailed to patient and sent via [employer]. Patient has my contact information and was encouraged to call with questions/concerns.

## 2022-02-07 NOTE — PATIENT INSTRUCTIONS
You are scheduled for a Diagnostic Cerebral Angiogram with Dr. Zapata on 2/23/22 at 8:30 AM.     Please follow these instructions:    * Prior to your procedure you will need to obtain COVID-19 testing within 2-4 days of your scheduled procedure. You are scheduled for 2/21/22 at 1:30 AM at [facility], located at [address] [place] [address]. Their phone number is [phone]. You will also need to obtain blood work at time of COVID testing. You are scheduled for 2/21/22 at 1:45 PM at [facility].    * You should arrive at the [facility] waiting room at the [facility] at 7:00 AM. The address is [address] [place] [address]. The phone number is [phone]. A map is enclosed.    * Do not eat after Midnight; you may drink clear liquids (includes water, Jell-O, clear broth, apple juice or any non-carbonated beverage that you can see through) until 6:30 AM.     * You may take your medications with a sip of water the morning of the procedure.     * You will be discharged the same day. You must have a  home and someone that can stay with you through the night.     PLEASE NOTE our COVID-19 visitors policy: For the protection of our patients and visitors, patients are allowed one consistent visitor, 18 years of age or older, per adult patient in the hospital. Visitors must wear a mask at all times while in the hospital.     All discharge instructions will be given to the  or volunteer. Documentation for the post-operative plan will be given to the patient and . Patients are required to have someone to stay with them for 24 hours after their procedure.    If you have questions regarding your procedure, please contact me at [phone], option 3.    If you need to cancel, reschedule or have procedure scheduling related questions, please call David at [phone].    Thank you,  Anton Woodson RN, CNRN  Stroke & Endovascular  Care Coordinator

## 2022-02-21 ENCOUNTER — LAB (OUTPATIENT)
Dept: LAB | Facility: CLINIC | Age: 47
End: 2022-02-21
Payer: COMMERCIAL

## 2022-02-21 DIAGNOSIS — I77.0 AVF (ARTERIOVENOUS FISTULA) (H): ICD-10-CM

## 2022-02-21 DIAGNOSIS — Z11.59 ENCOUNTER FOR SCREENING FOR OTHER VIRAL DISEASES: ICD-10-CM

## 2022-02-21 LAB
ANION GAP SERPL CALCULATED.3IONS-SCNC: 6 MMOL/L (ref 3–14)
APTT PPP: 39 SECONDS (ref 22–38)
BUN SERPL-MCNC: 17 MG/DL (ref 7–30)
CALCIUM SERPL-MCNC: 9.3 MG/DL (ref 8.5–10.1)
CHLORIDE BLD-SCNC: 110 MMOL/L (ref 94–109)
CO2 SERPL-SCNC: 26 MMOL/L (ref 20–32)
CREAT SERPL-MCNC: 1.01 MG/DL (ref 0.66–1.25)
ERYTHROCYTE [DISTWIDTH] IN BLOOD BY AUTOMATED COUNT: 12.4 % (ref 10–15)
GFR SERPL CREATININE-BSD FRML MDRD: >90 ML/MIN/1.73M2
GLUCOSE BLD-MCNC: 86 MG/DL (ref 70–99)
HCT VFR BLD AUTO: 44.3 % (ref 40–53)
HGB BLD-MCNC: 15 G/DL (ref 13.3–17.7)
INR PPP: 1.03 (ref 0.85–1.15)
MCH RBC QN AUTO: 29 PG (ref 26.5–33)
MCHC RBC AUTO-ENTMCNC: 33.9 G/DL (ref 31.5–36.5)
MCV RBC AUTO: 86 FL (ref 78–100)
PLATELET # BLD AUTO: 336 10E3/UL (ref 150–450)
POTASSIUM BLD-SCNC: 4.1 MMOL/L (ref 3.4–5.3)
RBC # BLD AUTO: 5.17 10E6/UL (ref 4.4–5.9)
SODIUM SERPL-SCNC: 142 MMOL/L (ref 133–144)
WBC # BLD AUTO: 4.8 10E3/UL (ref 4–11)

## 2022-02-21 PROCEDURE — U0005 INFEC AGEN DETEC AMPLI PROBE: HCPCS

## 2022-02-21 PROCEDURE — 85730 THROMBOPLASTIN TIME PARTIAL: CPT

## 2022-02-21 PROCEDURE — 80048 BASIC METABOLIC PNL TOTAL CA: CPT

## 2022-02-21 PROCEDURE — 85027 COMPLETE CBC AUTOMATED: CPT

## 2022-02-21 PROCEDURE — U0003 INFECTIOUS AGENT DETECTION BY NUCLEIC ACID (DNA OR RNA); SEVERE ACUTE RESPIRATORY SYNDROME CORONAVIRUS 2 (SARS-COV-2) (CORONAVIRUS DISEASE [COVID-19]), AMPLIFIED PROBE TECHNIQUE, MAKING USE OF HIGH THROUGHPUT TECHNOLOGIES AS DESCRIBED BY CMS-2020-01-R: HCPCS

## 2022-02-21 PROCEDURE — 36415 COLL VENOUS BLD VENIPUNCTURE: CPT

## 2022-02-21 PROCEDURE — 85610 PROTHROMBIN TIME: CPT

## 2022-02-22 LAB — SARS-COV-2 RNA RESP QL NAA+PROBE: NEGATIVE

## 2022-02-22 RX ORDER — HEPARIN SODIUM 200 [USP'U]/100ML
1 INJECTION, SOLUTION INTRAVENOUS CONTINUOUS PRN
Status: CANCELLED | OUTPATIENT
Start: 2022-02-22

## 2022-02-23 ENCOUNTER — APPOINTMENT (OUTPATIENT)
Dept: INTERVENTIONAL RADIOLOGY/VASCULAR | Facility: CLINIC | Age: 47
End: 2022-02-23
Attending: NEUROLOGICAL SURGERY
Payer: COMMERCIAL

## 2022-02-23 ENCOUNTER — APPOINTMENT (OUTPATIENT)
Dept: MEDSURG UNIT | Facility: CLINIC | Age: 47
End: 2022-02-23
Attending: NEUROLOGICAL SURGERY
Payer: COMMERCIAL

## 2022-02-23 ENCOUNTER — HOSPITAL ENCOUNTER (OUTPATIENT)
Facility: CLINIC | Age: 47
Discharge: HOME | End: 2022-02-23
Attending: NEUROLOGICAL SURGERY | Admitting: NEUROLOGICAL SURGERY
Payer: COMMERCIAL

## 2022-02-23 VITALS
HEIGHT: 68 IN | SYSTOLIC BLOOD PRESSURE: 104 MMHG | BODY MASS INDEX: 25.76 KG/M2 | HEART RATE: 72 BPM | DIASTOLIC BLOOD PRESSURE: 76 MMHG | WEIGHT: 170 LBS | OXYGEN SATURATION: 94 % | RESPIRATION RATE: 16 BRPM | TEMPERATURE: 97.8 F

## 2022-02-23 DIAGNOSIS — I77.0 AVF (ARTERIOVENOUS FISTULA) (H): ICD-10-CM

## 2022-02-23 PROCEDURE — 36227 PLACE CATH XTRNL CAROTID: CPT | Mod: 50 | Performed by: NEUROLOGICAL SURGERY

## 2022-02-23 PROCEDURE — 36224 PLACE CATH CAROTD ART: CPT | Mod: 50

## 2022-02-23 PROCEDURE — C1760 CLOSURE DEV, VASC: HCPCS

## 2022-02-23 PROCEDURE — 272N000566 HC SHEATH CR3

## 2022-02-23 PROCEDURE — C1769 GUIDE WIRE: HCPCS

## 2022-02-23 PROCEDURE — 76937 US GUIDE VASCULAR ACCESS: CPT | Mod: 26 | Performed by: NEUROLOGICAL SURGERY

## 2022-02-23 PROCEDURE — 272N000506 HC NEEDLE CR6

## 2022-02-23 PROCEDURE — 99153 MOD SED SAME PHYS/QHP EA: CPT

## 2022-02-23 PROCEDURE — 250N000013 HC RX MED GY IP 250 OP 250 PS 637: Performed by: NEUROLOGICAL SURGERY

## 2022-02-23 PROCEDURE — 99152 MOD SED SAME PHYS/QHP 5/>YRS: CPT | Mod: GC | Performed by: NEUROLOGICAL SURGERY

## 2022-02-23 PROCEDURE — 36224 PLACE CATH CAROTD ART: CPT | Mod: 50 | Performed by: NEUROLOGICAL SURGERY

## 2022-02-23 PROCEDURE — 76937 US GUIDE VASCULAR ACCESS: CPT

## 2022-02-23 PROCEDURE — 272N000192 HC ACCESSORY CR2

## 2022-02-23 PROCEDURE — 999N000134 HC STATISTIC PP CARE STAGE 3

## 2022-02-23 PROCEDURE — 36227 PLACE CATH XTRNL CAROTID: CPT

## 2022-02-23 PROCEDURE — C1887 CATHETER, GUIDING: HCPCS

## 2022-02-23 PROCEDURE — 36226 PLACE CATH VERTEBRAL ART: CPT | Mod: 50 | Performed by: NEUROLOGICAL SURGERY

## 2022-02-23 PROCEDURE — 99152 MOD SED SAME PHYS/QHP 5/>YRS: CPT

## 2022-02-23 PROCEDURE — 36226 PLACE CATH VERTEBRAL ART: CPT | Mod: 50

## 2022-02-23 PROCEDURE — 250N000011 HC RX IP 250 OP 636: Performed by: STUDENT IN AN ORGANIZED HEALTH CARE EDUCATION/TRAINING PROGRAM

## 2022-02-23 PROCEDURE — 999N000142 HC STATISTIC PROCEDURE PREP ONLY

## 2022-02-23 PROCEDURE — 250N000009 HC RX 250: Performed by: STUDENT IN AN ORGANIZED HEALTH CARE EDUCATION/TRAINING PROGRAM

## 2022-02-23 RX ORDER — HEPARIN SODIUM 200 [USP'U]/100ML
1 INJECTION, SOLUTION INTRAVENOUS CONTINUOUS PRN
Status: DISCONTINUED | OUTPATIENT
Start: 2022-02-23 | End: 2022-02-23 | Stop reason: HOSPADM

## 2022-02-23 RX ORDER — NALOXONE HYDROCHLORIDE 0.4 MG/ML
0.2 INJECTION, SOLUTION INTRAMUSCULAR; INTRAVENOUS; SUBCUTANEOUS
Status: DISCONTINUED | OUTPATIENT
Start: 2022-02-23 | End: 2022-02-23 | Stop reason: HOSPADM

## 2022-02-23 RX ORDER — HYDROCODONE BITARTRATE AND ACETAMINOPHEN 5; 325 MG/1; MG/1
1 TABLET ORAL ONCE
Status: COMPLETED | OUTPATIENT
Start: 2022-02-23 | End: 2022-02-23

## 2022-02-23 RX ORDER — LIDOCAINE 40 MG/G
CREAM TOPICAL
Status: DISCONTINUED | OUTPATIENT
Start: 2022-02-23 | End: 2022-02-23 | Stop reason: HOSPADM

## 2022-02-23 RX ORDER — NALOXONE HYDROCHLORIDE 0.4 MG/ML
0.4 INJECTION, SOLUTION INTRAMUSCULAR; INTRAVENOUS; SUBCUTANEOUS
Status: DISCONTINUED | OUTPATIENT
Start: 2022-02-23 | End: 2022-02-23 | Stop reason: HOSPADM

## 2022-02-23 RX ORDER — SODIUM CHLORIDE 9 MG/ML
INJECTION, SOLUTION INTRAVENOUS CONTINUOUS
Status: DISCONTINUED | OUTPATIENT
Start: 2022-02-23 | End: 2022-02-23 | Stop reason: HOSPADM

## 2022-02-23 RX ORDER — FENTANYL CITRATE 50 UG/ML
25-50 INJECTION, SOLUTION INTRAMUSCULAR; INTRAVENOUS EVERY 5 MIN PRN
Status: DISCONTINUED | OUTPATIENT
Start: 2022-02-23 | End: 2022-02-23 | Stop reason: HOSPADM

## 2022-02-23 RX ORDER — FLUMAZENIL 0.1 MG/ML
0.2 INJECTION, SOLUTION INTRAVENOUS
Status: DISCONTINUED | OUTPATIENT
Start: 2022-02-23 | End: 2022-02-23 | Stop reason: HOSPADM

## 2022-02-23 RX ORDER — IODIXANOL 320 MG/ML
300 INJECTION, SOLUTION INTRAVASCULAR ONCE
Status: DISCONTINUED | OUTPATIENT
Start: 2022-02-23 | End: 2022-02-23 | Stop reason: HOSPADM

## 2022-02-23 RX ADMIN — FENTANYL CITRATE 150 MCG: 50 INJECTION, SOLUTION INTRAMUSCULAR; INTRAVENOUS at 10:33

## 2022-02-23 RX ADMIN — HEPARIN SODIUM 3 BAG: 200 INJECTION, SOLUTION INTRAVENOUS at 10:34

## 2022-02-23 RX ADMIN — LIDOCAINE HYDROCHLORIDE 10 ML: 10 INJECTION, SOLUTION EPIDURAL; INFILTRATION; INTRACAUDAL; PERINEURAL at 10:33

## 2022-02-23 RX ADMIN — MIDAZOLAM 2 MG: 1 INJECTION INTRAMUSCULAR; INTRAVENOUS at 10:33

## 2022-02-23 RX ADMIN — HYDROCODONE BITARTRATE AND ACETAMINOPHEN 1 TABLET: 5; 325 TABLET ORAL at 11:53

## 2022-02-23 ASSESSMENT — VISUAL ACUITY
OU: GLASSES
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
## 2022-02-23 NOTE — DISCHARGE INSTRUCTIONS
[facility]         Interventional Radiology  Discharge Instructions Post Angiogram (NEURO)    AFTER YOU GO HOME  ? If you were given sedation, for the first 24 hours: we recommend that an adult stay with you, DO NOT drive or operate machinery at home or at work, DO NOT smoke, DO NOT make any important or legal decisions.  ? DO NOT drink alcoholic beverages the day of your procedure  ? DO relax and take it easy for 24 hours  ? DO drink plenty of fluids  ? DO resume your regular diet, unless otherwise instructed by your Primary Physician  ? DO NOT take a shower for at least 12 hours following your procedure. No tub bath, hot tubs, or swimming for 5 days. Do NOT scrub the procedure site vigorously for 5 days.      Care of groin site  It is normal to have a small bruise or lump at the site.    For the first 2 days, when you cough, sneeze or move your bowels, hold your hand over the puncture site and press gently.    Do NOT lift more than 10 pounds or do any strenuous exercise for at least 3 to 5 days.    Do not use lotion or powder near the puncture site for 3 days.  If you start bleeding from the site in your groin: lie down flat and press firmly on the site. Call your doctor as soon as you can.   Call 911 right away if you have: Heavy bleeding, bleeding that does not stop.          CALL THE PHYSICIAN IF:  ? You start bleeding from the procedure site.  ? You have numbness, coolness or change in color of the arm or leg of the puncture site  ? You experience changes in your vision, hearing, balance, coordination, speech, thinking or memory  ? You experience weakness in one or more extremity  ? You experience pain or redness at the puncture site  ? You develop nausea or vomiting  ? You develop hives or a rash or unexplained itching  ? You develop a temperature of 101 degrees F or greater      Stroke - Call 911    Remember: BE FAST        BALANCE--Sudden loss of balance or coordination. Example:  trouble walking      EYES--Sudden problem seeing out of one or both eyes      FACE--Sudden numbness or change to one side of the face. Examples: facial droop, uneven smile      ARM--Sudden numbness or weakness in one arm or leg      SPEECH--Sudden changes in speech or talking that doesn t make sense      TIME--if the person is having any of the symptoms above, CALL 911 immediately.      Symptoms may go away, then come back.      Sudden, worst headache of your life      ADDITIONAL INSTRUCTIONS  ? Instruction booklet given:  Angio Seal    [facility] INTERVENTIONAL RADIOLOGY DEPARTMENT  Procedure Physician:  Sammi Garcia and Cody  Date of procedure: February 23, 2022  Telephone Numbers: [phone] Monday-Friday 8:00 am to 4:30 pm  [phone] After 4:30 pm Monday-Friday, Weekends & Holidays.   Ask for the Neuro-Interventional doctor on call.  Someone is on call 24 hrs/day  [facility] toll free number: [phone] Monday-Friday 8:00 am to 4:30 pm  [facility] Emergency Dept: [phone]

## 2022-02-23 NOTE — PROGRESS NOTES
Returned from IR s/p cerebral angiogram.  VSS.  C/O groin site discomfort initially, but is now easing.  Cold pack applied.  Neuro's unchanged from prior to procedure.  All gross neuro's intact.  Right groin site CDI.  Resting in bed.

## 2022-02-23 NOTE — PROGRESS NOTES
Patient Name: Percy Curtis  Medical Record Number: [id number]  Today's Date: 2/23/2022    Procedure: Cerebral angiogram, femoral access with closure device.  Proceduralist: MD Benny., MD Cody., MD Navin.   Pathology present: n/a    Procedure Start: 0915  Procedure end: 1030  Sedation medications administered: Fentanyl: 150 mcg Versed:2mg     Report given to: LINDA RN  : n/a    Other Notes: Pt arrived to IR room 3 from . Consent reviewed. Pt denies any questions or concerns regarding procedure. Pt positioned supine and monitored per protocol.  Angioseal closure device deployed @ 1030*, 2 hour flat bedrest. Pt tolerated procedure without any noted complications. Pt transferred back to .    Ashley Rodríguez RN

## 2022-02-23 NOTE — PROGRESS NOTES
"Arrived from home for a cerebral angiogram.  VSS. Denies pain.  Has faint tinnitis, but unable to tell if it is at one ear or another or both.  Left ear occasionally has a \"swish\", but he cannot hear it now.  "

## 2022-02-23 NOTE — PROCEDURES
[facility]     Endovascular Surgical Neuroradiology Post-Procedure Note    Pre-Procedure Diagnosis: Dural AV fistula  Post-Procedure Diagnosis: Same predominantly fed by the left posterior inferior cerebellar artery on the left occipital artery    Procedure(s):   Diagnostic cervicocerebral angiography    Findings:    [] As above    Plan:   [] Keep right leg flat for 2 hours  [] Follow-up in clinic with Dr. Zapata to discuss further management    Primary Surgeon:  Dr. Janett Zapata  Secondary Surgeon:  Dr. Janett Zapata  Secondary Surgeon Review:  None  Fellow:  Navin Ross Heaton  Additional Assistants:  None     Prior to the start of the procedure and with procedural staff participation, I verbally confirmed: the patient s identity using two indicators, relevant allergies, that the procedure was appropriate and matched the consent or emergent situation, and that the correct equipment/implants were available. Immediately prior to starting the procedure I conducted the Time Out with the procedural staff and re-confirmed the patient s name, procedure, and site/side. (The Joint Commission universal protocol was followed.)  Yes    PRU value: Not applicable    Anesthesia:  Conscious Sedation  Medications:  Fentanyl 150 mcg, midazolam 2 mg  Puncture site:  Right Femoral Artery    Fluoroscopy time (minutes):  30.5  Radiation dose (mGy):  1203    Contrast amount (mL):  40     Estimated blood loss (mL):  30    Closure:  Device 6 [place]an Angio-Seal    Disposition:  Home after recovery.        Sedation Post-Procedure Summary    Sedatives: Fentanyl and Midazolam (Versed)    Vital signs and pulse oximetry were monitored and remained stable throughout the procedure, and sedation was maintained until the procedure was complete.  The patient was monitored by staff until sedation discharge criteria were met.    Patient tolerance:  Patient tolerated the procedure  well with no immediate complications.    Time of sedation in minutes:  60 minutes from beginning to end of physician one to one monitoring.    AJAY RICK MD  Pager: [phone]

## 2022-02-23 NOTE — PROGRESS NOTES
Tolerated bedrest.  Right groin site soreness somewhat better after Vicodin and ice packs, but pain not completely eliminated;  But tolerable.  Sammi Garcia and Cody saw patient at bedside and updated him as to results of angiogram.  Nabor tolerated food, fluids, ambulation and urination without issues.  Neuro's intact.  Reviewed discharge instructions with Nabor.  Discharged to home with his brother.

## 2022-03-09 ENCOUNTER — TELEPHONE (OUTPATIENT)
Dept: NEUROSURGERY | Facility: CLINIC | Age: 47
End: 2022-03-09
Payer: COMMERCIAL

## 2022-03-09 NOTE — TELEPHONE ENCOUNTER
Writer routed call to Stroke/Neurovascular Nurse Pool.   Attn: Anton Batres RNCC   *Pateint with post surgical question.     Rach Pugh LPN  Neurosurgery

## 2022-03-09 NOTE — TELEPHONE ENCOUNTER
[facility] Call Center    Phone Message    May a detailed message be left on voicemail: yes     Reason for Call: Other: Pt calling in he would like to know if it is normal for him to have brusing on the inner side of his upper R thigh, please call back to discuss further      Action Taken: Message routed to:  [facility] ([facility] neurosurgery     Travel Screening: Not Applicable

## 2022-04-15 ENCOUNTER — TELEPHONE (OUTPATIENT)
Dept: NEUROLOGY | Facility: CLINIC | Age: 47
End: 2022-04-15
Payer: COMMERCIAL

## 2022-04-15 ENCOUNTER — TELEPHONE (OUTPATIENT)
Dept: NEUROSURGERY | Facility: CLINIC | Age: 47
End: 2022-04-15
Payer: COMMERCIAL

## 2022-04-15 DIAGNOSIS — I77.0 AVF (ARTERIOVENOUS FISTULA) (H): Primary | ICD-10-CM

## 2022-04-15 DIAGNOSIS — Z11.59 ENCOUNTER FOR SCREENING FOR OTHER VIRAL DISEASES: Primary | ICD-10-CM

## 2022-04-15 NOTE — TELEPHONE ENCOUNTER
Called patient to schedule Procedure with     Date of Procedure: 5/12    Location of Procedure: [facility]    Pre-Op H&P: 5/3    Imaging Scheduled:  No    Scheduled COVID-19 Testing and Labs: [facility]    Additional comments: patient aware of above dates, will call with any questions and await further instruction from RN Anna C. Schoenecker on 4/15/2022 at 12:41 PM

## 2022-04-15 NOTE — TELEPHONE ENCOUNTER
Spoke with patient regarding scheduling embolization.    Would like to do mid May, if possible. Leaving for [place] ~ 6/11/22.     Will be moving out of cabin/house for the summer, and renting it out, so would like embo done prior.    Informed:  Scheduling will call to arrange.  Pre-op needed.  Overnight for observation.  Visitor policy.  Lab and COVID needed 2-4 days prior.  Will call patient after scheduled, closer to appointment date and send out instructions.     Patient verbalized understanding. All questions answered. Patient has my contact information and was encouraged to call with questions/concerns. Lianna Woodson RN 4/15/2022 9:39 AM

## 2022-04-18 NOTE — TELEPHONE ENCOUNTER
FUTURE VISIT INFORMATION      SURGERY INFORMATION:    Date: 22    Location: uu or    Surgeon:  Janett Zapata MD    Anesthesia Type:  general    Procedure: ANESTHESIA OUT OF OR FOR CAROTID CEREBRAL ANGIOGRAM BILATERAL EMBOLIZATION@1230    Consult: Allison VISIT     RECORDS REQUESTED FROM:       Primary Care Provider:    Mandy Hay APRN [facility]    Pertinent Medical History:    Most recent EKG+ Tracin/15/21    Most recent ECHO:    Most recent Cardiac Stress Test:    Most recent Coronary Angiogram:    Most recent PFT's:    Most recent Sleep Study:

## 2022-04-25 ENCOUNTER — PATIENT OUTREACH (OUTPATIENT)
Dept: NEUROLOGY | Facility: CLINIC | Age: 47
End: 2022-04-25
Payer: COMMERCIAL

## 2022-04-25 NOTE — PROGRESS NOTES
JAYJAYMM informing patient instructions sent via Pagido and mail. Encouraged to return call with questions. Lianna Woodson RN 4/25/2022 1:08 PM

## 2022-04-25 NOTE — PATIENT INSTRUCTIONS
You are scheduled for embolization with Dr. Zapata on 5/12/22 at 12:30 PM.    Please follow these instructions:    * Prior to your procedure you will need to obtain COVID-19 testing and blood work within 2-4 days of your scheduled procedure. These appointments have been scheduled for you at [facility], located at [address] [place] [id number]. Their phone number is [phone].  Appointments: COVID Test 5/9/22 at 4:15 PM, Blood Work 5/9/22 at 4:00 PM    * You will need a pre-op physical within 30 days prior to your procedure. You are scheduled for 5/3/22 at 5:00 PM for a video visit with the Pre-Operative Assessment Center (PAC). The PAC phone number is [phone].    * You should arrive at the Surgery Admission Unit (3C), on the third floor, at the [facility] at 10:30 AM. The address is [address] [place] [address]. The phone number is [phone]. A map is enclosed.    * Do not eat after Midnight; you may drink clear liquids (includes water, Jell-O, clear broth, apple juice or any non-carbonated beverage that you can see through) until 10:30 AM.     * You may take your medications with a sip of water the morning of the procedure.     * You will stay overnight at the hospital for observation after the procedure. We aim to discharge you by 11:00 AM the following day. Please have your ride available by 10:00 AM.     PLEASE NOTE our COVID-19 visitors policy: For the protection of our patients and visitors, patients are allowed one consistent visitor, 18 years of age or older, per adult patient in the hospital. Visitors must wear a mask at all times while in the hospital.     All discharge instructions will be given to the  or volunteer. Documentation for the post-operative plan will be given to the patient and . Patients are required to have someone to stay with them for 24 hours after their procedure.    If you have  questions regarding your procedure, please contact me at [phone], option 3.    If you need to cancel, reschedule or have procedure scheduling related questions, please call Tashia at [phone].    Thank you,  Anton Woodson, RN, CNRN  Stroke & Endovascular Care Coordinator

## 2022-04-28 ENCOUNTER — HOSPITAL ENCOUNTER (OUTPATIENT)
Facility: CLINIC | Age: 47
End: 2022-04-28
Attending: NEUROLOGICAL SURGERY | Admitting: NEUROLOGICAL SURGERY
Payer: COMMERCIAL

## 2022-05-03 ENCOUNTER — PRE VISIT (OUTPATIENT)
Dept: SURGERY | Facility: CLINIC | Age: 47
End: 2022-05-03

## 2022-05-03 ENCOUNTER — ANESTHESIA EVENT (OUTPATIENT)
Dept: SURGERY | Facility: CLINIC | Age: 47
End: 2022-05-03
Payer: COMMERCIAL

## 2022-05-03 ENCOUNTER — VIRTUAL VISIT (OUTPATIENT)
Dept: SURGERY | Facility: CLINIC | Age: 47
End: 2022-05-03
Payer: COMMERCIAL

## 2022-05-03 DIAGNOSIS — Z01.818 PREOP EXAMINATION: Primary | ICD-10-CM

## 2022-05-03 DIAGNOSIS — I77.0 ACQUIRED ARTERIOVENOUS FISTULA (H): ICD-10-CM

## 2022-05-03 PROCEDURE — 99203 OFFICE O/P NEW LOW 30 MIN: CPT | Mod: 95 | Performed by: NURSE PRACTITIONER

## 2022-05-03 RX ORDER — OMEPRAZOLE 20 MG/1
20 TABLET, DELAYED RELEASE ORAL PRN
COMMUNITY
Start: 2022-05-03

## 2022-05-03 RX ORDER — CALCIUM CARBONATE 500 MG/1
1 TABLET, CHEWABLE ORAL PRN
COMMUNITY
Start: 2022-05-03

## 2022-05-03 ASSESSMENT — PAIN SCALES - GENERAL: PAINLEVEL: NO PAIN (0)

## 2022-05-03 ASSESSMENT — LIFESTYLE VARIABLES: TOBACCO_USE: 0

## 2022-05-03 NOTE — PROGRESS NOTES
Percy is a 46 year old who is being evaluated via a billable video visit.      How would you like to obtain your AVS? MyChart      HPI         Review of Systems         Objective    Vitals - Patient Reported  Pain Score: No Pain (0)        Physical Exam     KUSH Shane LPN

## 2022-05-03 NOTE — PATIENT INSTRUCTIONS
Preparing for Your Surgery      Name:  Percy Curtis   MRN:  [id number]   :  1975   Today's Date:  5/3/2022       Arriving for surgery:  Surgery date:  22  Arrival time:  10:30 am    Restrictions due to COVID 19       Effective 22 [facility] is implementing the following visitor policy:     1 person may accompany the patient through the Pre-Op process.      That same person may wait in the Surgery Waiting room, provided there is enough room to social distance         Inpatients are allowed 2 visitors per day for the duration of their stay.        Visitors must wear a mask.      Visitors must not be ill.      Visiting hours are 8 am to 8 pm.    [facility] parking is available for anyone with mobility limitations or disabilities.  ([place]  24 hours/ 7 days a week; [facility]  7 am- 3:30 pm, Mon- Fri)    Please come to:     [facility] [address]  [address] [place] [address]    -  Parking is available at the Patient Visitor Ramp on [facility].    -  When entering the hospital, you will be asked some Covid screening questions and directed to Patient Registration. Patient Registration will then direct you to the 3rd floor Surgery Waiting Room.  [phone]?     - ?If you are in need of directions, wheelchair or escort please stop at the Information Desk in the lobby.  Inform the information person that you are here for surgery; a wheelchair and escort to Unit [address] will be provided.?     What can I eat or drink?  -  You may eat and drink normally for up to 8 hours before your surgery. (Until 4:30 am)  -  You may have clear liquids until 2 hours before surgery. (Until 10:30 am)    Examples of clear liquids:  Water  Clear broth  Juices (apple, white grape, white cranberry  and cider) without pulp  Noncarbonated, powder based beverages  (lemonade and Mitul-Aid)  Sodas (Sprite, 7-Up, ginger ale and  luciana)  Coffee or tea (without milk or cream)  Gatorade    -  No Alcohol for at least 24 hours before surgery     Which medicines can I take?  Hold Aspirin for 7 days before surgery.   Hold Multivitamins for 7 days before surgery. Take the last Multivitamin on 5-4-22 and then hold until surgery.   Hold Supplements for 7 days before surgery.  Hold Ibuprofen (Advil, Motrin) for 1 day before surgery--unless otherwise directed by surgeon.  Hold Naproxen (Aleve) for 4 days before surgery.    -  DO NOT take these medications the day of surgery:  Acetylcysteine (NAC), Vitamin C, Biotin, Vitamin D3, Magnesium, TUMS    -  PLEASE TAKE these medications the day of surgery:  Omeprazole (Prilosec) if needed  Acetaminophen (Tylenol) if needed    How do I prepare myself?  - Please take 2 showers before surgery using Scrubcare or Hibiclens soap.    Use this soap only from the neck to your toes.     Leave the soap on your skin for one minute--then rinse thoroughly.      You may use your own shampoo and conditioner; no other hair products.   - Please remove all jewelry and body piercings.  - No lotions, deodorants or fragrance.  - Bring your ID and insurance card.    -If you have a Deep Brain Stimulator, Spinal Cord Stimulator or any neuro stimulator device---you must bring the remote control to the hospital     - All patients are required to have a Covid-19 test within 4 days of surgery/procedure.      -Patients will be contacted by the [facility] scheduling team within 1 week of surgery to make an appointment.      - Patients may call the Scheduling team at [phone] if they have not been scheduled within 4 days of  surgery.      ALL PATIENTS GOING HOME THE SAME DAY OF SURGERY ARE REQUIRED TO HAVE A RESPONSIBLE ADULT TO DRIVE AND BE IN ATTENDANCE WITH THEM FOR 24 HOURS FOLLOWING SURGERY.      Questions or Concerns:    - For any questions regarding the day of surgery or your hospital stay, please contact the Pre  Admission Nursing Office at [phone].       - If you have health changes between today and your surgery please call your surgeon.       For questions after surgery please call your surgeons office.

## 2022-05-03 NOTE — H&P
"  Pre-Operative H & P     CC:  Preoperative exam to assess for increased cardiopulmonary risk while undergoing surgery and anesthesia.    Date of Encounter: 5/3/2022  Primary Care Physician:  Mandy Hay     Reason for visit:   Encounter Diagnoses   Name Primary?     Preop examination Yes     Acquired arteriovenous fistula (H)        HPI  Percy Curtis is a 46 year old male who presents for pre-operative H & P in preparation for  Procedure Information     Case: [id number] Date/Time: 05/12/22 1230    Procedure: ANESTHESIA OUT OF OR FOR CAROTID CEREBRAL ANGIOGRAM BILATERAL EMBOLIZATION@1230 (Bilateral Update)    Anesthesia type: General    Diagnosis: Acquired arteriovenous fistula (H) [I77.0]    Pre-op diagnosis: Acquired arteriovenous fistula (H) [I77.0]    [facility]  / [facility]    Providers: GENERIC ANESTHESIA PROVIDER          Percy Curtis is a 46 year old male with GERD and history of covid that has an acquired arteriovenous fistula.  He was hospitalized for respiratory distress due to covid early this past October.  He notes that prior to that, he always had some chronic tinnitus, but since then the tinnitus worsened.  In addition to the worsening tinnitus he was hearing his heart \"pounding\" and \"blood swooshing\" behind his left ear.  Due to these auditory/sensory changes a brain MRI was ordered and showed a possible AVF.  He was subsequently referred to Dr. Zapata for further evaluation.  Further imaging was done for confirmation.  The above listed procedure has now been recommended for treatment.     History is obtained from the patient and chart review    Hx of abnormal bleeding or anti-platelet use: none      Past Medical History  Past Medical History:   Diagnosis Date     Acquired arteriovenous fistula (H)      Gastroesophageal reflux disease without esophagitis      History of 2019 novel coronavirus disease (COVID-19) 10/2021       Past Surgical History  Past Surgical History: "   Procedure Laterality Date     IR CAROTID CEREBRAL ANGIOGRAM BILATERAL  2/23/2022     TONSILLECTOMY         Prior to Admission Medications  Current Outpatient Medications   Medication Sig Dispense Refill     acetaminophen (TYLENOL) 500 MG tablet Take 1,000 mg by mouth every 6 hours as needed for mild pain       acetylcysteine (N-ACETYL CYSTEINE) 600 MG CAPS capsule Take 600 mg by mouth daily       Ascorbic Acid (VITAMIN C) 500 MG CAPS Take 1 capsule by mouth daily       biotin 1000 MCG TABS tablet Take 1,000 mcg by mouth daily       calcium carbonate (TUMS) 500 MG chewable tablet Take 1 tablet (500 mg) by mouth as needed for heartburn       cholecalciferol (VITAMIN D3) 125 mcg (5000 units) capsule Take 125 mcg by mouth daily       magnesium 250 MG tablet Take 1 tablet by mouth daily       Multiple Vitamin (MULTIVITAMIN ADULT PO) Take 1 tablet by mouth daily       omeprazole (PRILOSEC OTC) 20 MG EC tablet Take 1 tablet (20 mg) by mouth as needed         Allergies  Allergies   Allergen Reactions     Food      watermelon, almonds, walnuts.     Other Environmental Allergy      PN: LW FI1: watermelon, almonds, walnuts LW FI2: banana-scratch feeling in throat     Sulfamethoxazole-Trimethoprim Other (See Comments)     PN: mouth irritation, depressed mood  PN: mouth irritation, depressed mood       Penicillins Hives and Rash     Lumps on tongue       Social History  Social History     Socioeconomic History     Marital status:      Spouse name: Not on file     Number of children: 2     Years of education: Not on file     Highest education level: Not on file   Occupational History     Occupation: sales   Tobacco Use     Smoking status: Never Smoker     Smokeless tobacco: Never Used   Vaping Use     Vaping Use: Never used   Substance and Sexual Activity     Alcohol use: Yes     Comment: A few drinks per week, mostly beer;     Drug use: Never     Sexual activity: Yes     Partners: Female   Other Topics Concern     Not  on file   Social History Narrative     Not on file     Social Determinants of Health     Financial Resource Strain: Not on file   Food Insecurity: Not on file   Transportation Needs: Not on file   Physical Activity: Not on file   Stress: Not on file   Social Connections: Not on file   Intimate Partner Violence: Not on file   Housing Stability: Not on file       Family History  Family History   Problem Relation Age of Onset     Deep Vein Thrombosis Mother      Neurologic Disorder Mother      Heart Disease Father      Aneurysm Father      Polycystic Kidney Diease Father      Atrial fibrillation Father      Kidney failure Father      LUNG DISEASE Brother      Polycystic Kidney Diease Brother      Anesthesia Reaction No family hx of        Review of Systems  The complete review of systems is negative other than noted in the HPI or here.   Anesthesia Evaluation   Pt has had prior anesthetic. Type: General.    No history of anesthetic complications       ROS/MED HX  ENT/Pulmonary: Comment: +covid long haul (reports intermittent random symptoms.  Denies any respiratory symptoms.) Was admitted early Oct 2021 for Covid respiratory distress    (+) PASQUALE risk factors, snores loudly,  (-) tobacco use and recent URI   Neurologic: Comment: Acquired arteriovenous fistula, chronic tinnitus      Cardiovascular:     (+) -----Irregular Heartbeat/Palpitations, Previous cardiac testing   Echo: Date: Results:    Stress Test: Date: Results:    ECG Reviewed: Date: 11/2021 Results:  Sinus tachycardia  Cath: Date: Results:      METS/Exercise Tolerance: >4 METS    Hematologic:  - neg hematologic  ROS  (-) history of blood clots and history of blood transfusion   Musculoskeletal:  - neg musculoskeletal ROS     GI/Hepatic:     (+) GERD, Other,     Renal/Genitourinary:  - neg Renal ROS     Endo:  - neg endo ROS     Psychiatric/Substance Use:  - neg psychiatric ROS     Infectious Disease:  - neg infectious disease ROS     Malignancy:  - neg  malignancy ROS     Other:  - neg other ROS          Virtual visit -  No vitals were obtained    Physical Exam  Constitutional: Awake, alert, cooperative, no apparent distress, and appears stated age.  Eyes: Pupils equal  HENT: Normocephalic  Respiratory: non labored breathing   Neurologic: Awake, alert, oriented to name, place and time.   Neuropsychiatric: Calm, cooperative. Normal affect.      Prior Labs/Diagnostic Studies   All labs and imaging personally reviewed     EKG  11/2021  Sinus tachycardia    Labs:   Component      Latest Ref Rng & Units 5/9/2022   Sodium      133 - 144 mmol/L 140   Potassium      3.4 - 5.3 mmol/L 3.8   Chloride      94 - 109 mmol/L 108   Carbon Dioxide      20 - 32 mmol/L 27   Anion Gap      3 - 14 mmol/L 5   Urea Nitrogen      7 - 30 mg/dL 16   Creatinine      0.66 - 1.25 mg/dL 0.98   Calcium      8.5 - 10.1 mg/dL 8.9   Glucose      70 - 99 mg/dL 82   GFR Estimate      >60 mL/min/1.73m2 >90   WBC      4.0 - 11.0 10e3/uL 7.6   RBC Count      4.40 - 5.90 10e6/uL 4.88   Hemoglobin      13.3 - 17.7 g/dL 14.4   Hematocrit      40.0 - 53.0 % 41.9   MCV      78 - 100 fL 86   MCH      26.5 - 33.0 pg 29.5   MCHC      31.5 - 36.5 g/dL 34.4   RDW      10.0 - 15.0 % 12.3   Platelet Count      150 - 450 10e3/uL 289   PTT      22 - 38 Seconds 33   INR      0.85 - 1.15 0.99         The patient's records and results personally reviewed by this provider.     Outside records reviewed from: Care Everywhere      Assessment      Percy Curtis is a 46 year old male seen as a PAC referral for risk assessment and optimization for anesthesia.    Plan/Recommendations  Pt will be optimized for the proposed procedure.  See below for details on the assessment, risk, and preoperative recommendations    NEUROLOGY  - No history of TIA, CVA or seizure  + acquired AV fistula.  Procedure planned as above.   -Post Op delirium risk factors:  No risk identified    ENT  - No current airway concerns.  Will need to be  "reassessed day of surgery.  Mallampati: Unable to assess  TM: Unable to assess    CARDIAC  - He reports a hx palpitations that have improved since decreasing caffeine in his diet.  Previous holter monitoring showed no significant arrhythimias  - METS (Metabolic Equivalents) = >4.  He doesn't exercise purposefully, but reports he can walk several blocks and do yard work with no complications.   Patient performs 4 or more METS exercise without symptoms            Total Score: 0      RCRI-Very low risk: Class 1 0.4% complication rate            Total Score: 0        PULMONARY    PASQUALE Low Risk            Total Score: 2    PASQUALE: Snores loudly    PASQUALE: Male      - Denies asthma or inhaler use  - Tobacco History      History   Smoking Status     Never Smoker   Smokeless Tobacco     Never Used       GI  - GERD is managed with prn tums, prn omeprazole and diet changes.   PONV Medium Risk  Total Score: 2           1 AN PONV: Patient is not a current smoker    1 AN PONV: Intended Post Op Opioids          ENDOCRINE   - BMI: Estimated body mass index is 25.85 kg/m  as calculated from the following:    Height as of 2/23/22: 1.727 m (5' 8\").    Weight as of 2/23/22: 77.1 kg (170 lb).    - No history of Diabetes Mellitus    HEME  VTE Medium Risk 1.8%            Total Score: 6    VTE: Male    VTE: Family Hx of VTE      - No history of abnormal bleeding or antiplatelet use.            The patient is optimized for their procedure. AVS with information on surgery time/arrival time, meds and NPO status given by nursing staff. No further diagnostic testing indicated.    Virtual visit - Please refer to the physical examination documented by the anesthesiologist in the anesthesia record on the day of surgery.        Video-Visit Details    Type of service:  Video Visit    Patient verbally consented to video service today: YES    Video Start Time: 1648  Video End Time (time video stopped): 1712    Originating Location (pt. Location): " Home    Distant Location (provider location):  [facility]     Mode of Communication:  Video Conference via [employer]       On the day of service:     Prep time: 11 minutes  Visit time: 24 minutes  Documentation time: 9 minutes  ------------------------------------------  Total time: 44 minutes      SHAILESH Nicole CNP  Preoperative Assessment Center  [facility]  Cl[facility]  Phone: [phone]  Fax: [phone]

## 2022-05-09 ENCOUNTER — LAB (OUTPATIENT)
Dept: LAB | Facility: CLINIC | Age: 47
End: 2022-05-09
Payer: COMMERCIAL

## 2022-05-09 ENCOUNTER — LAB (OUTPATIENT)
Dept: LAB | Facility: CLINIC | Age: 47
End: 2022-05-09

## 2022-05-09 DIAGNOSIS — Z11.59 ENCOUNTER FOR SCREENING FOR OTHER VIRAL DISEASES: ICD-10-CM

## 2022-05-09 DIAGNOSIS — I77.0 AVF (ARTERIOVENOUS FISTULA) (H): ICD-10-CM

## 2022-05-09 LAB
ANION GAP SERPL CALCULATED.3IONS-SCNC: 5 MMOL/L (ref 3–14)
APTT PPP: 33 SECONDS (ref 22–38)
BUN SERPL-MCNC: 16 MG/DL (ref 7–30)
CALCIUM SERPL-MCNC: 8.9 MG/DL (ref 8.5–10.1)
CHLORIDE BLD-SCNC: 108 MMOL/L (ref 94–109)
CO2 SERPL-SCNC: 27 MMOL/L (ref 20–32)
CREAT SERPL-MCNC: 0.98 MG/DL (ref 0.66–1.25)
ERYTHROCYTE [DISTWIDTH] IN BLOOD BY AUTOMATED COUNT: 12.3 % (ref 10–15)
GFR SERPL CREATININE-BSD FRML MDRD: >90 ML/MIN/1.73M2
GLUCOSE BLD-MCNC: 82 MG/DL (ref 70–99)
HCT VFR BLD AUTO: 41.9 % (ref 40–53)
HGB BLD-MCNC: 14.4 G/DL (ref 13.3–17.7)
INR PPP: 0.99 (ref 0.85–1.15)
MCH RBC QN AUTO: 29.5 PG (ref 26.5–33)
MCHC RBC AUTO-ENTMCNC: 34.4 G/DL (ref 31.5–36.5)
MCV RBC AUTO: 86 FL (ref 78–100)
PLATELET # BLD AUTO: 289 10E3/UL (ref 150–450)
POTASSIUM BLD-SCNC: 3.8 MMOL/L (ref 3.4–5.3)
RBC # BLD AUTO: 4.88 10E6/UL (ref 4.4–5.9)
SODIUM SERPL-SCNC: 140 MMOL/L (ref 133–144)
WBC # BLD AUTO: 7.6 10E3/UL (ref 4–11)

## 2022-05-09 PROCEDURE — 36415 COLL VENOUS BLD VENIPUNCTURE: CPT

## 2022-05-09 PROCEDURE — 85610 PROTHROMBIN TIME: CPT

## 2022-05-09 PROCEDURE — 85730 THROMBOPLASTIN TIME PARTIAL: CPT

## 2022-05-09 PROCEDURE — U0005 INFEC AGEN DETEC AMPLI PROBE: HCPCS

## 2022-05-09 PROCEDURE — U0003 INFECTIOUS AGENT DETECTION BY NUCLEIC ACID (DNA OR RNA); SEVERE ACUTE RESPIRATORY SYNDROME CORONAVIRUS 2 (SARS-COV-2) (CORONAVIRUS DISEASE [COVID-19]), AMPLIFIED PROBE TECHNIQUE, MAKING USE OF HIGH THROUGHPUT TECHNOLOGIES AS DESCRIBED BY CMS-2020-01-R: HCPCS

## 2022-05-09 PROCEDURE — 85027 COMPLETE CBC AUTOMATED: CPT

## 2022-05-09 PROCEDURE — 80048 BASIC METABOLIC PNL TOTAL CA: CPT
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
## 2022-05-10 LAB — SARS-COV-2 RNA RESP QL NAA+PROBE: NEGATIVE

## 2022-05-12 ENCOUNTER — ANESTHESIA (OUTPATIENT)
Dept: SURGERY | Facility: CLINIC | Age: 47
End: 2022-05-12
Payer: COMMERCIAL

## 2022-05-12 ENCOUNTER — HOSPITAL ENCOUNTER (INPATIENT)
Facility: CLINIC | Age: 47
LOS: 1 days | Discharge: HOME | End: 2022-05-13
Attending: NEUROLOGICAL SURGERY | Admitting: RADIOLOGY
Payer: COMMERCIAL

## 2022-05-12 ENCOUNTER — APPOINTMENT (OUTPATIENT)
Dept: INTERVENTIONAL RADIOLOGY/VASCULAR | Facility: CLINIC | Age: 47
End: 2022-05-12
Attending: NEUROLOGICAL SURGERY
Payer: COMMERCIAL

## 2022-05-12 DIAGNOSIS — I77.0 AVF (ARTERIOVENOUS FISTULA) (H): ICD-10-CM

## 2022-05-12 LAB
ABO/RH(D): NORMAL
ANTIBODY SCREEN: NEGATIVE
GLUCOSE BLDC GLUCOMTR-MCNC: 119 MG/DL (ref 70–99)
GLUCOSE BLDC GLUCOMTR-MCNC: 89 MG/DL (ref 70–99)
SPECIMEN EXPIRATION DATE: NORMAL

## 2022-05-12 PROCEDURE — 370N000017 HC ANESTHESIA TECHNICAL FEE, PER MIN

## 2022-05-12 PROCEDURE — 250N000009 HC RX 250

## 2022-05-12 PROCEDURE — 36216 PLACE CATHETER IN ARTERY: CPT | Mod: XS | Performed by: NEUROLOGICAL SURGERY

## 2022-05-12 PROCEDURE — 255N000002 HC RX 255 OP 636: Performed by: NEUROLOGICAL SURGERY

## 2022-05-12 PROCEDURE — 250N000013 HC RX MED GY IP 250 OP 250 PS 637: Performed by: PSYCHIATRY & NEUROLOGY

## 2022-05-12 PROCEDURE — 272N000409 HC GLUE EMBOLI CR28

## 2022-05-12 PROCEDURE — 75894 X-RAYS TRANSCATH THERAPY: CPT | Mod: 26 | Performed by: NEUROLOGICAL SURGERY

## 2022-05-12 PROCEDURE — 258N000003 HC RX IP 258 OP 636: Performed by: PSYCHIATRY & NEUROLOGY

## 2022-05-12 PROCEDURE — 36217 PLACE CATHETER IN ARTERY: CPT | Mod: GC | Performed by: NEUROLOGICAL SURGERY

## 2022-05-12 PROCEDURE — 36218 PLACE CATHETER IN ARTERY: CPT | Mod: GC | Performed by: NEUROLOGICAL SURGERY

## 2022-05-12 PROCEDURE — 250N000025 HC SEVOFLURANE, PER MIN

## 2022-05-12 PROCEDURE — 250N000011 HC RX IP 250 OP 636

## 2022-05-12 PROCEDURE — 75894 X-RAYS TRANSCATH THERAPY: CPT

## 2022-05-12 PROCEDURE — C1887 CATHETER, GUIDING: HCPCS

## 2022-05-12 PROCEDURE — C2628 CATHETER, OCCLUSION: HCPCS

## 2022-05-12 PROCEDURE — 75898 FOLLOW-UP ANGIOGRAPHY: CPT | Mod: 26 | Performed by: NEUROLOGICAL SURGERY

## 2022-05-12 PROCEDURE — 36012 PLACE CATHETER IN VEIN: CPT | Mod: 50 | Performed by: NEUROLOGICAL SURGERY

## 2022-05-12 PROCEDURE — 258N000003 HC RX IP 258 OP 636

## 2022-05-12 PROCEDURE — 250N000011 HC RX IP 250 OP 636: Performed by: ANESTHESIOLOGY

## 2022-05-12 PROCEDURE — 03LG3DZ OCCLUSION OF INTRACRANIAL ARTERY WITH INTRALUMINAL DEVICE, PERCUTANEOUS APPROACH: ICD-10-PCS | Performed by: RADIOLOGY

## 2022-05-12 PROCEDURE — 61626 TCAT PERM OCCLS/EMBOL NONCNS: CPT

## 2022-05-12 PROCEDURE — 75898 FOLLOW-UP ANGIOGRAPHY: CPT

## 2022-05-12 PROCEDURE — 250N000009 HC RX 250: Performed by: PSYCHIATRY & NEUROLOGY

## 2022-05-12 PROCEDURE — 86901 BLOOD TYPING SEROLOGIC RH(D): CPT | Performed by: ANESTHESIOLOGY

## 2022-05-12 PROCEDURE — 36224 PLACE CATH CAROTD ART: CPT

## 2022-05-12 PROCEDURE — 272N000613 HC CATH NEURO CR21

## 2022-05-12 PROCEDURE — 272N000506 HC NEEDLE CR6

## 2022-05-12 PROCEDURE — C1760 CLOSURE DEV, VASC: HCPCS

## 2022-05-12 PROCEDURE — 250N000011 HC RX IP 250 OP 636: Performed by: STUDENT IN AN ORGANIZED HEALTH CARE EDUCATION/TRAINING PROGRAM

## 2022-05-12 PROCEDURE — 272N000192 HC ACCESSORY CR2

## 2022-05-12 PROCEDURE — 272N000566 HC SHEATH CR3

## 2022-05-12 PROCEDURE — 200N000002 HC R&B ICU UMMC

## 2022-05-12 PROCEDURE — 86850 RBC ANTIBODY SCREEN: CPT | Performed by: ANESTHESIOLOGY

## 2022-05-12 PROCEDURE — 36227 PLACE CATH XTRNL CAROTID: CPT

## 2022-05-12 PROCEDURE — 272N000572 HC SHEATH CR9

## 2022-05-12 PROCEDURE — 36226 PLACE CATH VERTEBRAL ART: CPT

## 2022-05-12 PROCEDURE — 61624 TCAT PERM OCCLS/EMBOLJ CNS: CPT | Mod: GC | Performed by: NEUROLOGICAL SURGERY

## 2022-05-12 PROCEDURE — 250N000013 HC RX MED GY IP 250 OP 250 PS 637: Performed by: ANESTHESIOLOGY

## 2022-05-12 PROCEDURE — C1769 GUIDE WIRE: HCPCS

## 2022-05-12 PROCEDURE — 710N000010 HC RECOVERY PHASE 1, LEVEL 2, PER MIN

## 2022-05-12 PROCEDURE — 999N000141 HC STATISTIC PRE-PROCEDURE NURSING ASSESSMENT

## 2022-05-12 RX ORDER — SODIUM CHLORIDE 9 MG/ML
INJECTION, SOLUTION INTRAVENOUS CONTINUOUS
Status: DISCONTINUED | OUTPATIENT
Start: 2022-05-12 | End: 2022-05-13 | Stop reason: HOSPADM

## 2022-05-12 RX ORDER — HEPARIN SODIUM 200 [USP'U]/100ML
1 INJECTION, SOLUTION INTRAVENOUS CONTINUOUS PRN
Status: DISCONTINUED | OUTPATIENT
Start: 2022-05-12 | End: 2022-05-12

## 2022-05-12 RX ORDER — IBUPROFEN 200 MG
200 TABLET ORAL EVERY 6 HOURS PRN
Status: DISCONTINUED | OUTPATIENT
Start: 2022-05-12 | End: 2022-05-13 | Stop reason: HOSPADM

## 2022-05-12 RX ORDER — DEXAMETHASONE SODIUM PHOSPHATE 4 MG/ML
INJECTION, SOLUTION INTRA-ARTICULAR; INTRALESIONAL; INTRAMUSCULAR; INTRAVENOUS; SOFT TISSUE PRN
Status: DISCONTINUED | OUTPATIENT
Start: 2022-05-12 | End: 2022-05-12

## 2022-05-12 RX ORDER — HYDROMORPHONE HYDROCHLORIDE 1 MG/ML
0.2 INJECTION, SOLUTION INTRAMUSCULAR; INTRAVENOUS; SUBCUTANEOUS EVERY 5 MIN PRN
Status: DISCONTINUED | OUTPATIENT
Start: 2022-05-12 | End: 2022-05-13 | Stop reason: HOSPADM

## 2022-05-12 RX ORDER — IODIXANOL 320 MG/ML
100 INJECTION, SOLUTION INTRAVASCULAR ONCE
Status: DISCONTINUED | OUTPATIENT
Start: 2022-05-12 | End: 2022-05-12

## 2022-05-12 RX ORDER — ONDANSETRON 2 MG/ML
4 INJECTION INTRAMUSCULAR; INTRAVENOUS EVERY 6 HOURS PRN
Status: DISCONTINUED | OUTPATIENT
Start: 2022-05-12 | End: 2022-05-13 | Stop reason: HOSPADM

## 2022-05-12 RX ORDER — ONDANSETRON 2 MG/ML
INJECTION INTRAMUSCULAR; INTRAVENOUS PRN
Status: DISCONTINUED | OUTPATIENT
Start: 2022-05-12 | End: 2022-05-12

## 2022-05-12 RX ORDER — SODIUM CHLORIDE, SODIUM LACTATE, POTASSIUM CHLORIDE, CALCIUM CHLORIDE 600; 310; 30; 20 MG/100ML; MG/100ML; MG/100ML; MG/100ML
INJECTION, SOLUTION INTRAVENOUS CONTINUOUS
Status: DISCONTINUED | OUTPATIENT
Start: 2022-05-12 | End: 2022-05-13 | Stop reason: HOSPADM

## 2022-05-12 RX ORDER — LIDOCAINE HYDROCHLORIDE 10 MG/ML
1-30 INJECTION, SOLUTION EPIDURAL; INFILTRATION; INTRACAUDAL; PERINEURAL
Status: COMPLETED | OUTPATIENT
Start: 2022-05-12 | End: 2022-05-12

## 2022-05-12 RX ORDER — LIDOCAINE HYDROCHLORIDE 20 MG/ML
INJECTION, SOLUTION INFILTRATION; PERINEURAL PRN
Status: DISCONTINUED | OUTPATIENT
Start: 2022-05-12 | End: 2022-05-12

## 2022-05-12 RX ORDER — ONDANSETRON 2 MG/ML
4 INJECTION INTRAMUSCULAR; INTRAVENOUS EVERY 30 MIN PRN
Status: DISCONTINUED | OUTPATIENT
Start: 2022-05-12 | End: 2022-05-12

## 2022-05-12 RX ORDER — PROPOFOL 10 MG/ML
INJECTION, EMULSION INTRAVENOUS PRN
Status: DISCONTINUED | OUTPATIENT
Start: 2022-05-12 | End: 2022-05-12

## 2022-05-12 RX ORDER — ONDANSETRON 4 MG/1
4 TABLET, ORALLY DISINTEGRATING ORAL EVERY 6 HOURS PRN
Status: DISCONTINUED | OUTPATIENT
Start: 2022-05-12 | End: 2022-05-13 | Stop reason: HOSPADM

## 2022-05-12 RX ORDER — SODIUM CHLORIDE, SODIUM LACTATE, POTASSIUM CHLORIDE, CALCIUM CHLORIDE 600; 310; 30; 20 MG/100ML; MG/100ML; MG/100ML; MG/100ML
INJECTION, SOLUTION INTRAVENOUS CONTINUOUS PRN
Status: DISCONTINUED | OUTPATIENT
Start: 2022-05-12 | End: 2022-05-12

## 2022-05-12 RX ORDER — LIDOCAINE 40 MG/G
CREAM TOPICAL
Status: DISCONTINUED | OUTPATIENT
Start: 2022-05-12 | End: 2022-05-13 | Stop reason: HOSPADM

## 2022-05-12 RX ORDER — ONDANSETRON 4 MG/1
4 TABLET, ORALLY DISINTEGRATING ORAL EVERY 30 MIN PRN
Status: DISCONTINUED | OUTPATIENT
Start: 2022-05-12 | End: 2022-05-12

## 2022-05-12 RX ORDER — SODIUM CHLORIDE, SODIUM LACTATE, POTASSIUM CHLORIDE, CALCIUM CHLORIDE 600; 310; 30; 20 MG/100ML; MG/100ML; MG/100ML; MG/100ML
INJECTION, SOLUTION INTRAVENOUS CONTINUOUS
Status: DISCONTINUED | OUTPATIENT
Start: 2022-05-12 | End: 2022-05-12 | Stop reason: ALTCHOICE

## 2022-05-12 RX ORDER — FENTANYL CITRATE 50 UG/ML
25 INJECTION, SOLUTION INTRAMUSCULAR; INTRAVENOUS EVERY 5 MIN PRN
Status: DISCONTINUED | OUTPATIENT
Start: 2022-05-12 | End: 2022-05-13 | Stop reason: HOSPADM

## 2022-05-12 RX ORDER — ACETAMINOPHEN 500 MG
1000 TABLET ORAL EVERY 6 HOURS PRN
Status: DISCONTINUED | OUTPATIENT
Start: 2022-05-12 | End: 2022-05-13 | Stop reason: HOSPADM

## 2022-05-12 RX ORDER — IBUPROFEN 400 MG/1
400 TABLET, FILM COATED ORAL EVERY 6 HOURS PRN
Status: DISCONTINUED | OUTPATIENT
Start: 2022-05-12 | End: 2022-05-13 | Stop reason: HOSPADM

## 2022-05-12 RX ORDER — ACETAMINOPHEN 325 MG/1
325 TABLET ORAL EVERY 4 HOURS PRN
Status: DISCONTINUED | OUTPATIENT
Start: 2022-05-12 | End: 2022-05-12

## 2022-05-12 RX ORDER — PHENYLEPHRINE HCL IN 0.9% NACL 50MG/250ML
.1-1 PLASTIC BAG, INJECTION (ML) INTRAVENOUS CONTINUOUS
Status: DISCONTINUED | OUTPATIENT
Start: 2022-05-12 | End: 2022-05-13 | Stop reason: HOSPADM

## 2022-05-12 RX ORDER — PROCHLORPERAZINE MALEATE 5 MG
10 TABLET ORAL EVERY 6 HOURS PRN
Status: DISCONTINUED | OUTPATIENT
Start: 2022-05-12 | End: 2022-05-13 | Stop reason: HOSPADM

## 2022-05-12 RX ORDER — OXYCODONE HYDROCHLORIDE 5 MG/1
5 TABLET ORAL EVERY 4 HOURS PRN
Status: DISCONTINUED | OUTPATIENT
Start: 2022-05-12 | End: 2022-05-13 | Stop reason: HOSPADM

## 2022-05-12 RX ORDER — FENTANYL CITRATE 50 UG/ML
INJECTION, SOLUTION INTRAMUSCULAR; INTRAVENOUS PRN
Status: DISCONTINUED | OUTPATIENT
Start: 2022-05-12 | End: 2022-05-12

## 2022-05-12 RX ORDER — NICOTINE POLACRILEX 4 MG
15-30 LOZENGE BUCCAL
Status: DISCONTINUED | OUTPATIENT
Start: 2022-05-12 | End: 2022-05-13 | Stop reason: HOSPADM

## 2022-05-12 RX ORDER — DEXTROSE MONOHYDRATE 25 G/50ML
25-50 INJECTION, SOLUTION INTRAVENOUS
Status: DISCONTINUED | OUTPATIENT
Start: 2022-05-12 | End: 2022-05-13 | Stop reason: HOSPADM

## 2022-05-12 RX ORDER — PROCHLORPERAZINE 25 MG
25 SUPPOSITORY, RECTAL RECTAL EVERY 12 HOURS PRN
Status: DISCONTINUED | OUTPATIENT
Start: 2022-05-12 | End: 2022-05-13 | Stop reason: HOSPADM

## 2022-05-12 RX ORDER — HEPARIN SODIUM 1000 [USP'U]/ML
INJECTION, SOLUTION INTRAVENOUS; SUBCUTANEOUS PRN
Status: DISCONTINUED | OUTPATIENT
Start: 2022-05-12 | End: 2022-05-12

## 2022-05-12 RX ADMIN — ACETAMINOPHEN 1000 MG: 500 TABLET ORAL at 19:55

## 2022-05-12 RX ADMIN — SODIUM CHLORIDE, POTASSIUM CHLORIDE, SODIUM LACTATE AND CALCIUM CHLORIDE: 600; 310; 30; 20 INJECTION, SOLUTION INTRAVENOUS at 13:36

## 2022-05-12 RX ADMIN — IODIXANOL 130 ML: 320 INJECTION, SOLUTION INTRAVASCULAR at 17:40

## 2022-05-12 RX ADMIN — FENTANYL CITRATE 50 MCG: 50 INJECTION, SOLUTION INTRAMUSCULAR; INTRAVENOUS at 15:54

## 2022-05-12 RX ADMIN — HEPARIN SODIUM 1000 UNITS: 1000 INJECTION INTRAVENOUS; SUBCUTANEOUS at 17:29

## 2022-05-12 RX ADMIN — FENTANYL CITRATE 50 MCG: 50 INJECTION, SOLUTION INTRAMUSCULAR; INTRAVENOUS at 16:27

## 2022-05-12 RX ADMIN — ONDANSETRON 4 MG: 2 INJECTION INTRAMUSCULAR; INTRAVENOUS at 17:36

## 2022-05-12 RX ADMIN — ROCURONIUM BROMIDE 30 MG: 50 INJECTION, SOLUTION INTRAVENOUS at 14:39

## 2022-05-12 RX ADMIN — ROCURONIUM BROMIDE 20 MG: 50 INJECTION, SOLUTION INTRAVENOUS at 15:30

## 2022-05-12 RX ADMIN — SUGAMMADEX 200 MG: 100 INJECTION, SOLUTION INTRAVENOUS at 17:49

## 2022-05-12 RX ADMIN — SODIUM CHLORIDE, POTASSIUM CHLORIDE, SODIUM LACTATE AND CALCIUM CHLORIDE: 600; 310; 30; 20 INJECTION, SOLUTION INTRAVENOUS at 17:20

## 2022-05-12 RX ADMIN — FENTANYL CITRATE 25 MCG: 50 INJECTION, SOLUTION INTRAMUSCULAR; INTRAVENOUS at 17:55

## 2022-05-12 RX ADMIN — LIDOCAINE HYDROCHLORIDE 10 ML: 10 INJECTION, SOLUTION EPIDURAL; INFILTRATION; INTRACAUDAL; PERINEURAL at 17:36

## 2022-05-12 RX ADMIN — SODIUM CHLORIDE: 9 INJECTION, SOLUTION INTRAVENOUS at 21:52

## 2022-05-12 RX ADMIN — HEPARIN SODIUM 6000 UNITS: 1000 INJECTION INTRAVENOUS; SUBCUTANEOUS at 14:30

## 2022-05-12 RX ADMIN — HEPARIN SODIUM 1000 UNITS: 1000 INJECTION INTRAVENOUS; SUBCUTANEOUS at 15:30

## 2022-05-12 RX ADMIN — DEXAMETHASONE SODIUM PHOSPHATE 8 MG: 4 INJECTION, SOLUTION INTRA-ARTICULAR; INTRALESIONAL; INTRAMUSCULAR; INTRAVENOUS; SOFT TISSUE at 14:05

## 2022-05-12 RX ADMIN — OXYCODONE HYDROCHLORIDE 5 MG: 5 TABLET ORAL at 19:55

## 2022-05-12 RX ADMIN — HYDROMORPHONE HYDROCHLORIDE 0.2 MG: 1 INJECTION, SOLUTION INTRAMUSCULAR; INTRAVENOUS; SUBCUTANEOUS at 19:12

## 2022-05-12 RX ADMIN — HYDROMORPHONE HYDROCHLORIDE 0.2 MG: 1 INJECTION, SOLUTION INTRAMUSCULAR; INTRAVENOUS; SUBCUTANEOUS at 18:53

## 2022-05-12 RX ADMIN — HEPARIN SODIUM 1000 UNITS: 1000 INJECTION INTRAVENOUS; SUBCUTANEOUS at 16:28

## 2022-05-12 RX ADMIN — IBUPROFEN 400 MG: 400 TABLET, FILM COATED ORAL at 23:12

## 2022-05-12 RX ADMIN — MIDAZOLAM 2 MG: 1 INJECTION INTRAMUSCULAR; INTRAVENOUS at 13:31

## 2022-05-12 RX ADMIN — ROCURONIUM BROMIDE 20 MG: 50 INJECTION, SOLUTION INTRAVENOUS at 17:02

## 2022-05-12 RX ADMIN — FENTANYL CITRATE 50 MCG: 50 INJECTION, SOLUTION INTRAMUSCULAR; INTRAVENOUS at 17:08

## 2022-05-12 RX ADMIN — HEPARIN SODIUM 8 BAG: 200 INJECTION, SOLUTION INTRAVENOUS at 13:48

## 2022-05-12 RX ADMIN — PROPOFOL 150 MG: 10 INJECTION, EMULSION INTRAVENOUS at 13:49

## 2022-05-12 RX ADMIN — ROCURONIUM BROMIDE 50 MG: 50 INJECTION, SOLUTION INTRAVENOUS at 13:49

## 2022-05-12 RX ADMIN — FENTANYL CITRATE 200 MCG: 50 INJECTION, SOLUTION INTRAMUSCULAR; INTRAVENOUS at 13:48

## 2022-05-12 RX ADMIN — SODIUM CHLORIDE, POTASSIUM CHLORIDE, SODIUM LACTATE AND CALCIUM CHLORIDE: 600; 310; 30; 20 INJECTION, SOLUTION INTRAVENOUS at 12:54

## 2022-05-12 RX ADMIN — LIDOCAINE HYDROCHLORIDE 100 MG: 20 INJECTION, SOLUTION INFILTRATION; PERINEURAL at 13:48

## 2022-05-12 RX ADMIN — ROCURONIUM BROMIDE 20 MG: 50 INJECTION, SOLUTION INTRAVENOUS at 16:18

## 2022-05-12 ASSESSMENT — VISUAL ACUITY
OU: NORMAL ACUITY

## 2022-05-12 ASSESSMENT — ACTIVITIES OF DAILY LIVING (ADL)
ADLS_ACUITY_SCORE: 20

## 2022-05-12 NOTE — ANESTHESIA PROCEDURE NOTES
Airway         Procedure Start/Stop Times: 5/12/2022 1:55 PM and 5/12/2022 2:00 PM  Staff -        Anesthesiologist:  Lv Carranza MD       CRNA: Antolin Casillas APRN CRNA       Performed By: CRNAIndications and Patient Condition       Indications for airway management: fredo-procedural       Induction type:intravenous       Mask difficulty assessment: 1 - vent by mask    Final Airway Details       Final airway type: endotracheal airway       Successful airway: ETT - single and Oral  Endotracheal Airway Details        ETT size (mm): 7.5       Cuffed: yes       Successful intubation technique: direct laryngoscopy       DL Blade Type: MAC 3       Grade View of Cords: 1       Adjucts: stylet       Position: Right       Measured from: gums/teeth       Secured at (cm): 23       Bite block used: None    Post intubation assessment        Placement verified by: capnometry, equal breath sounds and chest rise        Number of attempts at approach: 1       Number of other approaches attempted: 0       Secured with: pink tape       Ease of procedure: easy       Dentition: Intact    Medication(s) Administered   Medication Administration Time: 5/12/2022 1:55 PM

## 2022-05-12 NOTE — ANESTHESIA CARE TRANSFER NOTE
Patient: Percy Curtis    Procedure: Procedure(s):  ANESTHESIA OUT OF OR FOR CAROTID CEREBRAL ANGIOGRAM BILATERAL EMBOLIZATION@1230       Diagnosis: Acquired arteriovenous fistula (H) [I77.0]  Diagnosis Additional Information: No value filed.    Anesthesia Type:   General     Note:    Oropharynx: oropharynx clear of all foreign objects and spontaneously breathing  Level of Consciousness: awake  Oxygen Supplementation: room air    Independent Airway: airway patency satisfactory and stable  Dentition: dentition unchanged  Vital Signs Stable: post-procedure vital signs reviewed and stable  Report to RN Given: handoff report given  Patient transferred to: PACU    Handoff Report: Identifed the Patient, Identified the Reponsible Provider, Reviewed the pertinent medical history, Discussed the surgical course, Reviewed Intra-OP anesthesia mangement and issues during anesthesia, Set expectations for post-procedure period and Allowed opportunity for questions and acknowledgement of understanding      Vitals:  Vitals Value Taken Time   /70    Temp     Pulse 96 05/12/22 1827   Resp 14 05/12/22 1827   SpO2 93 % 05/12/22 1827   Vitals shown include unvalidated device data.    Electronically Signed By: SHAILESH Queen CRNA  May 12, 2022  6:29 PM

## 2022-05-12 NOTE — IR NOTE
Patient Name: Percy Curtis  Medical Record Number: [id number]  Today's Date: 5/12/2022    Procedure: Bilateral cerebral angiogram with embolization  Proceduralist: Dr. Zapata, Dr. Sims, Dr De Dios, Dr. Funes    Procedure Start: 1414  Procedure end: 1804  Sedation medications administered: general anesthesia     Report given to: Jenny PACU RN  : NA    Other Notes: Pt arrived to IR room 3 from . Consent reviewed. Pt denies any questions or concerns regarding procedure. Pt positioned supine and monitored per protocol. Pt tolerated procedure without any noted complications. Pt transferred to PACU.    Left femoral vein access site (1) closed with exoseal.  Right femoral vein access site (2) closed with manual pressure as exoseal failed.  Right femoral artery access site (3) closed with Angioseal device.     Flat bedrest x 4 hours until 2210.

## 2022-05-12 NOTE — PROCEDURES
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    Post-Procedure Diagnosis: Transarterial complete exclusion of fistulous connection at the left transverse sigmoid junction following Goff 18 embolization    Procedure(s):   Endovascular treatment of dural arteriovenous fistula    Findings:    [ ]  [place] type I left transverse sigmoid junction dural AV fistula supplied by left occipital artery and left PICA  [ ]  Successful Goff 18 embolization of dural AV fistula with complete exclusion of fistulous connection at the left transverse sigmoid junction    Plan:    [ ]  Admit ICU  [ ]  Strict bed rest x 4 hrs    [ ]  Discharge jalil    Primary Surgeon:  Dr. Renny Helms  Secondary Surgeon:  Not applicable  Secondary Surgeon Review:  None  Fellow:  Dr Sims, Dr Funes, Dr De Dios  Additional Assistants:      Prior to the start of the procedure and with procedural staff participation, I verbally confirmed: the patient s identity using two indicators, relevant allergies, that the procedure was appropriate and matched the consent or emergent situation, and that the correct equipment/implants were available. Immediately prior to starting the procedure I conducted the Time Out with the procedural staff and re-confirmed the patient s name, procedure, and site/side. (The Joint Commission universal protocol was followed.)  Yes    PRU value: Not applicable  Anesthesia:  Performed by Anesthesia  Medications:  As per anesthesia, IV heparin, 1% plain lidocaine  Puncture site:  Right Femoral Artery, Right Femoral vein, Left Femoral vein    Fluoroscopy time (minutes): 134  Radiation dose (mGy):  5042    Contrast amount (mL):  130     Estimated blood loss (mL): 30    Closure:  Device: 6 Nigerien Angio-Seal to right femoral artery, 6 Nigerien exoseal to right femoral  vein    Disposition:  Will be followed in hospital by the Neuro Endovascular team.        Sedation Post-Procedure Summary    Sedatives: Nitrous Oxide    Vital signs and pulse oximetry were monitored and remained stable throughout the procedure, and sedation was maintained until the procedure was complete.  The patient was monitored by staff until sedation discharge criteria were met.    Patient tolerance:  Patient tolerated the procedure well with no immediate complications.    Time of sedation in minutes:  225 minutes from beginning to end of physician one to one monitoring.    GILDARDO Rich  Neuro IR Fellow  Pager: [phone]

## 2022-05-12 NOTE — ANESTHESIA PREPROCEDURE EVALUATION
Anesthesia Pre-Procedure Evaluation    Patient: Percy Curtis   MRN: [id number] : 1975        Procedure : Procedure(s):  ANESTHESIA OUT OF OR FOR CAROTID CEREBRAL ANGIOGRAM BILATERAL EMBOLIZATION@1230          Past Medical History:   Diagnosis Date     Acquired arteriovenous fistula (H)      Gastroesophageal reflux disease without esophagitis      History of 2019 novel coronavirus disease (COVID-19) 10/2021      Past Surgical History:   Procedure Laterality Date     IR CAROTID CEREBRAL ANGIOGRAM BILATERAL  2022     TONSILLECTOMY        Allergies   Allergen Reactions     Food      watermelon, almonds, walnuts.     Other Environmental Allergy      PN: LW FI1: watermelon, almonds, walnuts LW FI2: banana-scratch feeling in throat     Sulfamethoxazole-Trimethoprim Other (See Comments)     PN: mouth irritation, depressed mood  PN: mouth irritation, depressed mood       Penicillins Hives and Rash     Lumps on tongue      Social History     Tobacco Use     Smoking status: Never Smoker     Smokeless tobacco: Never Used   Substance Use Topics     Alcohol use: Yes     Comment: A few drinks per week, mostly beer;      Wt Readings from Last 1 Encounters:   22 80.5 kg (177 lb 7.5 oz)        Anesthesia Evaluation   Pt has not had prior anesthetic         ROS/MED HX  ENT/Pulmonary:       Neurologic:       Cardiovascular:       METS/Exercise Tolerance:     Hematologic:       Musculoskeletal:       GI/Hepatic:     (+) GERD,     Renal/Genitourinary:       Endo:       Psychiatric/Substance Use:       Infectious Disease:       Malignancy:       Other:               OUTSIDE LABS:  CBC:   Lab Results   Component Value Date    WBC 7.6 2022    WBC 4.8 2022    HGB 14.4 2022    HGB 15.0 2022    HCT 41.9 2022    HCT 44.3 2022     2022     2022     BMP:   Lab Results   Component Value Date     2022     2022    POTASSIUM 3.8  05/09/2022    POTASSIUM 4.1 02/21/2022    CHLORIDE 108 05/09/2022    CHLORIDE 110 (H) 02/21/2022    CO2 27 05/09/2022    CO2 26 02/21/2022    BUN 16 05/09/2022    BUN 17 02/21/2022    CR 0.98 05/09/2022    CR 1.01 02/21/2022    GLC 89 05/12/2022    GLC 82 05/09/2022     COAGS:   Lab Results   Component Value Date    PTT 33 05/09/2022    INR 0.99 05/09/2022    FIBR 588 (H) 10/08/2021     POC: No results found for: BGM, HCG, HCGS  HEPATIC:   Lab Results   Component Value Date    ALBUMIN 4.1 11/18/2021    PROTTOTAL 7.8 11/18/2021    ALT 46 11/18/2021    AST 20 11/18/2021    ALKPHOS 92 11/18/2021    BILITOTAL 0.7 11/18/2021     OTHER:   Lab Results   Component Value Date    KAIT 8.9 05/09/2022    MAG 2.6 (H) 11/18/2021    LIPASE 227 10/04/2021    TSH 1.04 11/18/2021    CRP <2.9 11/18/2021       Anesthesia Plan    ASA Status:  2      Anesthesia Type: General.     - Airway: ETT   Induction: Intravenous.   Maintenance: Balanced.   Techniques and Equipment:     - Lines/Monitors: 2nd IV (Possible A-line)     Consents    Anesthesia Plan(s) and associated risks, benefits, and realistic alternatives discussed. Questions answered and patient/representative(s) expressed understanding.    - Discussed:     - Discussed with:  Patient      - Extended Intubation/Ventilatory Support Discussed: No.      - Patient is DNR/DNI Status: No    Use of blood products discussed: No .     Postoperative Care    Pain management: IV analgesics.   PONV prophylaxis: Ondansetron (or other 5HT-3), Dexamethasone or Solumedrol     Comments:                Lv Carranza MD

## 2022-05-13 ENCOUNTER — APPOINTMENT (OUTPATIENT)
Dept: CT IMAGING | Facility: CLINIC | Age: 47
End: 2022-05-13
Attending: NEUROLOGICAL SURGERY
Payer: COMMERCIAL

## 2022-05-13 VITALS
OXYGEN SATURATION: 99 % | SYSTOLIC BLOOD PRESSURE: 101 MMHG | RESPIRATION RATE: 16 BRPM | HEIGHT: 68 IN | BODY MASS INDEX: 27.77 KG/M2 | WEIGHT: 183.2 LBS | DIASTOLIC BLOOD PRESSURE: 69 MMHG | TEMPERATURE: 98.5 F | HEART RATE: 82 BPM

## 2022-05-13 DIAGNOSIS — R52 PAIN: Primary | ICD-10-CM

## 2022-05-13 LAB — GLUCOSE BLDC GLUCOMTR-MCNC: 119 MG/DL (ref 70–99)

## 2022-05-13 PROCEDURE — 70498 CT ANGIOGRAPHY NECK: CPT

## 2022-05-13 PROCEDURE — 250N000013 HC RX MED GY IP 250 OP 250 PS 637: Performed by: PSYCHIATRY & NEUROLOGY

## 2022-05-13 PROCEDURE — 258N000003 HC RX IP 258 OP 636: Performed by: PSYCHIATRY & NEUROLOGY

## 2022-05-13 PROCEDURE — 999N000128 HC STATISTIC PERIPHERAL IV START W/O US GUIDANCE

## 2022-05-13 PROCEDURE — 250N000013 HC RX MED GY IP 250 OP 250 PS 637

## 2022-05-13 PROCEDURE — 70496 CT ANGIOGRAPHY HEAD: CPT

## 2022-05-13 PROCEDURE — 70450 CT HEAD/BRAIN W/O DYE: CPT

## 2022-05-13 PROCEDURE — 250N000011 HC RX IP 250 OP 636: Performed by: NEUROLOGICAL SURGERY

## 2022-05-13 PROCEDURE — 70498 CT ANGIOGRAPHY NECK: CPT | Mod: 26 | Performed by: RADIOLOGY

## 2022-05-13 PROCEDURE — 70496 CT ANGIOGRAPHY HEAD: CPT | Mod: 26 | Performed by: RADIOLOGY

## 2022-05-13 PROCEDURE — 250N000013 HC RX MED GY IP 250 OP 250 PS 637: Performed by: ANESTHESIOLOGY

## 2022-05-13 RX ORDER — IOPAMIDOL 755 MG/ML
75 INJECTION, SOLUTION INTRAVASCULAR ONCE
Status: COMPLETED | OUTPATIENT
Start: 2022-05-13 | End: 2022-05-13

## 2022-05-13 RX ORDER — CALCIUM CARBONATE 500 MG/1
1000 TABLET, CHEWABLE ORAL 2 TIMES DAILY PRN
Status: DISCONTINUED | OUTPATIENT
Start: 2022-05-13 | End: 2022-05-13 | Stop reason: HOSPADM

## 2022-05-13 RX ORDER — OXYCODONE HYDROCHLORIDE 5 MG/1
5 TABLET ORAL EVERY 6 HOURS PRN
Qty: 20 TABLET | Refills: 0 | Status: SHIPPED | OUTPATIENT
Start: 2022-05-13 | End: 2022-05-18

## 2022-05-13 RX ADMIN — SODIUM CHLORIDE: 9 INJECTION, SOLUTION INTRAVENOUS at 06:48

## 2022-05-13 RX ADMIN — ANTACID TABLETS 1000 MG: 500 TABLET, CHEWABLE ORAL at 00:25

## 2022-05-13 RX ADMIN — ACETAMINOPHEN 1000 MG: 500 TABLET ORAL at 04:06

## 2022-05-13 RX ADMIN — IOPAMIDOL 75 ML: 755 INJECTION, SOLUTION INTRAVENOUS at 09:07

## 2022-05-13 RX ADMIN — OXYCODONE HYDROCHLORIDE 5 MG: 5 TABLET ORAL at 08:00

## 2022-05-13 RX ADMIN — OXYCODONE HYDROCHLORIDE 5 MG: 5 TABLET ORAL at 04:06

## 2022-05-13 RX ADMIN — IBUPROFEN 400 MG: 400 TABLET, FILM COATED ORAL at 08:00

## 2022-05-13 ASSESSMENT — ACTIVITIES OF DAILY LIVING (ADL)
ADLS_ACUITY_SCORE: 20

## 2022-05-13 NOTE — ANESTHESIA POSTPROCEDURE EVALUATION
Patient: Percy Curtis    Procedure: Procedure(s):  ANESTHESIA OUT OF OR FOR CAROTID CEREBRAL ANGIOGRAM BILATERAL EMBOLIZATION@1230       Anesthesia Type:  General    Note:  Disposition: Admission   Postop Pain Control: Uneventful            Sign Out: Well controlled pain   PONV: No   Neuro/Psych: Uneventful            Sign Out: Acceptable/Baseline neuro status   Airway/Respiratory: Uneventful            Sign Out: Acceptable/Baseline resp. status   CV/Hemodynamics: Uneventful            Sign Out: Acceptable CV status; No obvious hypovolemia; No obvious fluid overload   Other NRE: NONE   DID A NON-ROUTINE EVENT OCCUR? No           Last vitals:  Vitals Value Taken Time   /74 05/12/22 1900   Temp 36.8  C (98.2  F) 05/12/22 1825   Pulse 88 05/12/22 1912   Resp 9 05/12/22 1912   SpO2 97 % 05/12/22 1912   Vitals shown include unvalidated device data.    Electronically Signed By: Franki Orr MD  May 12, 2022  7:14 PM

## 2022-05-13 NOTE — TELEPHONE ENCOUNTER
Per Dr. Sims prescription for Oxycodone 5 mg #20 queued up and routed to Dr. Zapata to [employer]. Lianna Woodson RN 5/13/2022 10:44 AM
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
## 2022-05-13 NOTE — PLAN OF CARE
Neuro: A&O, PERRLA, BLE neurovascular intact. +2 pulses.     Cardiac: SR, No edema, afebrile    Resp:  LS clear, sats >92% on RA    GI/: active BS, hooker out, DTV    Skin: Bilateral groin sites unchanged    Plan: Discharge today

## 2022-05-16 ENCOUNTER — PATIENT OUTREACH (OUTPATIENT)
Dept: NEUROLOGY | Facility: CLINIC | Age: 47
End: 2022-05-16
Payer: COMMERCIAL

## 2022-05-16 NOTE — PROGRESS NOTES
"Endovascular Surgical Neuroradiology - Post Discharge Call    Admit: 5/12/22    Discharge: 5/13/22    MD/Service: Dr. Helms for Dr. Zapata/BLAIR    Pre-Procedure Diagnosis: Lanette type I left transverse sigmoid junction dural AV fistula     Post-Procedure Diagnosis: Transarterial complete exclusion of fistulous connection at the left transverse sigmoid junction following Armin 18 embolization     Procedure(s):   Endovascular treatment of dural arteriovenous fistula (Right Femoral Artery, Right Femoral vein, Left Femoral vein punctures)     Findings:    [ ]  Ashley type I left transverse sigmoid junction dural AV fistula supplied by left occipital artery and left PICA  [ ]  Successful Armin 18 embolization of dural AV fistula with complete exclusion of fistulous connection at the left transverse sigmoid junction     Plan:   [ ] Follow-up with Dr. Zapata in 4-6 weeks. (Message sent to scheduling to contact patient to make appointment.)  [ ] Oxycodone prescribed for headaches.     Spoke with patient. States he is doing ok. Still having headaches. Neck sore on left side - muscular soreness. Tender behind jaw. Significant bruising in right groin - 6\" x 8\" area. Left groin looks good. Denies swelling, lump, pain. On feet quite a bit on Saturday. Following lifting restrictions. Sunday relaxed most of the day. Denies s/s stroke. Taking Advil 600 mg every 6 hours. No oxy yesterday. Took oxy this am. Eating and drinking okay. Normal bowel movements.     Needs ST disability forms completed. Will have HR send forms or call.    Patient has my contact information and was encouraged to call with questions/concerns. Updated Dr. Sims.  Lianna Woodson RN 5/16/2022 9:34 AM       "
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
## 2022-05-18 ENCOUNTER — TELEPHONE (OUTPATIENT)
Dept: NEUROSURGERY | Facility: CLINIC | Age: 47
End: 2022-05-18
Payer: COMMERCIAL

## 2022-05-18 NOTE — TELEPHONE ENCOUNTER
[facility] Call Center    Phone Message    May a detailed message be left on voicemail: yes     Reason for Call: Other: Pt calling in requesting a call back to discuss his post op plan of care and documents that are needed. Please call pt back    **Pt requesting Lianna gupta   Action Taken: Message routed to:  [facility]  neurosurgery     Travel Screening: Not Applicable

## 2022-05-19 NOTE — TELEPHONE ENCOUNTER
Spoke with patient. States he received response to his question via [employer] He has no further questions at this time. Encouraged to call with questions. Lianna Woodson RN 5/19/2022 12:58 PM

## 2022-06-08 ENCOUNTER — TELEPHONE (OUTPATIENT)
Dept: NEUROLOGY | Facility: CLINIC | Age: 47
End: 2022-06-08
Payer: COMMERCIAL

## 2022-06-08 NOTE — TELEPHONE ENCOUNTER
"Patient s/p embolization (5/12/22).    Spoke with patient. States headaches are resolving and \"shortened.\" Pressure headache which resolves within 5 minutes. Uses Advil or tylenol, if needed. Did not take Medrol dose pack prescribed on 5/25/22.    Weird numbness in hands when he sleeps on his side - mild version of hand falling asleep - tiny tingle - more like lack of blood flow. R>L. Also left shoulder pain.     When he puts foot up on knee will occasionally have same feeling in feet.     Occasional groin soreness. Right still slight bruising - tender to palpation. Doing a lot of yard work, and \"pushing himself.\"  Left groin looks/feels good.     Doing a lot more around the house. Very sedentary since COVID last fall. Trying to be more active now.     * Short term disability paperwork to be completed with return to work date as 7/5/22.   * Patient will follow-up with Dr. Zapata as scheduled oon 6/28/22.     Patient verbalized understanding. Patient has my contact information and was encouraged to call with questions/concerns. Lianna Woodson RN 6/8/2022 11:17 AM     "

## 2022-07-06 ENCOUNTER — TELEPHONE (OUTPATIENT)
Dept: NEUROLOGY | Facility: CLINIC | Age: 47
End: 2022-07-06

## 2022-07-06 DIAGNOSIS — I77.0 AVF (ARTERIOVENOUS FISTULA) (H): Primary | ICD-10-CM

## 2022-07-06 NOTE — TELEPHONE ENCOUNTER
Patient no-showed 6/28/22 visit with Dr. Zapata for follow-up on embo (5/12/22).     [facility] for patient to return call. Lianna Woodson RN 7/7/2022 10:04 AM

## 2022-07-07 NOTE — TELEPHONE ENCOUNTER
[facility] Call Center    Phone Message    May a detailed message be left on voicemail: yes     Reason for Call: Other: Patient is returning Lianna's call.  Please call patient back.     Action Taken: Message routed to:  [facility]    Travel Screening: Not Applicable

## 2022-07-07 NOTE — TELEPHONE ENCOUNTER
Writer routed to Stroke/Neurovascular Nurse Pool.   Attn: Lianna Woodson RNCC     Rach Pugh LPN  Neurosurgery

## 2022-07-08 NOTE — TELEPHONE ENCOUNTER
"Spoke with patient. States he spoke with Dr. Zapata around 6/28/22.     Headaches - no \"big ones\" x 5 days. Left side of face  feels like \"towards the end of novocain wearing off, tight, past the tingling stage.\"    Trying to decrease ibuprofen use per Dr. Zapata's advice. Has been using Tylenol more.     States swishing sound has resolved. For a while tinnitus seemed louder after embo. Now back to pre-embo decibel.     States he spoke with Dr. Zapata about all of the above.     Past few days has had a lot more of the heart flutters that he experienced last year. Cards monitor in December 2021 showed \"rare premature beat, no concerning abnormal arrhythmias.\" Same type of flutters.     Started back to work on 7/5/22. States it is going well. \"He is crushing it.\" States job is stressful - potential to lose 30% of his pay if he misses something.     States he would like to find a new primary care provider.     States Dr. Zapata told him to follow-up end of July, beginning of August for another scan, he thinks an MRV.     Will confirm follow-up with Dr. Zapata and have scheduling reach out to arrange. Patient verbalized understanding. Lianna Woodson RN 7/8/2022 4:41 PM               "

## 2022-07-13 ENCOUNTER — TELEPHONE (OUTPATIENT)
Dept: NEUROSURGERY | Facility: CLINIC | Age: 47
End: 2022-07-13

## 2022-07-13 NOTE — TELEPHONE ENCOUNTER
Spoke to wife Grecia let her know I faxed short term disability paperwork to fax#[phone] per JUAN KIRKLAND. No other questions or concerns at this time.

## 2022-07-13 NOTE — TELEPHONE ENCOUNTER
[facility] Call Center    Phone Message    May a detailed message be left on voicemail: yes     Reason for Call: Form or Letter   Type or form/letter needing completion: Short Term Disabilty  Provider: Janett Zapata MD  Date form needed: ASAP  Once completed: Call Pt    Pt's wife Grecia is calling because pt had surgery a couple of months ago and Lianna was working on pt's short term disability for pt to get paid. Grecia hasn't heard anything about it since. Wondering if Lianna completed the paperwork.      Action Taken: Message routed to:  [facility] NEUROSURGERY    Travel Screening: Not Applicable

## 2022-07-27 NOTE — TELEPHONE ENCOUNTER
"Per Dr. Zapata's 6/28/22 note: \"MRV and MRI and BMP check at [place] at the end of July, early August\"    Orders placed. Message sent to scheduling to contact patient to make appointments. Lianna Woodson RN 7/27/2022 3:55 PM     " 04/10/2021  [id number]  (SERIES OF 3)   BILATERAL KNEES. APPROVED FOR SPECIALTY PHARMACY  CASE ID: [id number]. DATES:  04/10/2021 - 06/09/2021. PER FAX FROM [facility]. VERBAL RX GIVEN TO [facility]. PENDING INSURANCE VERIFICATION, PATIENT CONSENT TO SHIP TO OFFICE, AND ANY COPAYMENT.  AP

## 2022-08-06 ENCOUNTER — TELEPHONE (OUTPATIENT)
Dept: NEUROSURGERY | Facility: CLINIC | Age: 47
End: 2022-08-06

## 2022-08-11 ENCOUNTER — TELEPHONE (OUTPATIENT)
Dept: NEUROLOGY | Facility: CLINIC | Age: 47
End: 2022-08-11

## 2022-08-11 NOTE — TELEPHONE ENCOUNTER
"Per Dr. Zapata's 6/28/22 note: \"MRV and MRI and BMP check at [place] at the end of July, early August\"    Patient contacted by scheduling to arrange however no appointments scheduled as of now.     Spoke with patient. States he plans to set up appointments on 8/22, maybe sooner. Patient will call today to schedule. RN will watch for appointments and follow-up with patient after imaging/lab completed and Dr. Zapata has reviewed. Patient verbalized understanding. Patient has my contact information and was encouraged to call with questions/concerns. Lianna Woodson RN 8/11/2022 12:25 PM     "

## 2022-08-17 ENCOUNTER — HOSPITAL ENCOUNTER (OUTPATIENT)
Dept: MRI IMAGING | Facility: CLINIC | Age: 47
Discharge: HOME | End: 2022-08-17
Attending: NEUROLOGICAL SURGERY | Admitting: NEUROLOGICAL SURGERY
Payer: COMMERCIAL

## 2022-08-17 ENCOUNTER — HOSPITAL ENCOUNTER (OUTPATIENT)
Dept: MRI IMAGING | Facility: CLINIC | Age: 47
Discharge: HOME | End: 2022-08-17
Attending: NEUROLOGICAL SURGERY
Payer: COMMERCIAL

## 2022-08-17 ENCOUNTER — LAB (OUTPATIENT)
Dept: LAB | Facility: CLINIC | Age: 47
End: 2022-08-17
Payer: COMMERCIAL

## 2022-08-17 DIAGNOSIS — I77.0 AVF (ARTERIOVENOUS FISTULA) (H): ICD-10-CM

## 2022-08-17 LAB
ANION GAP SERPL CALCULATED.3IONS-SCNC: 6 MMOL/L (ref 3–14)
BUN SERPL-MCNC: 11 MG/DL (ref 7–30)
CALCIUM SERPL-MCNC: 9.1 MG/DL (ref 8.5–10.1)
CHLORIDE BLD-SCNC: 105 MMOL/L (ref 94–109)
CO2 SERPL-SCNC: 28 MMOL/L (ref 20–32)
CREAT SERPL-MCNC: 0.9 MG/DL (ref 0.66–1.25)
GFR SERPL CREATININE-BSD FRML MDRD: >90 ML/MIN/1.73M2
GLUCOSE BLD-MCNC: 126 MG/DL (ref 70–99)
POTASSIUM BLD-SCNC: 3.9 MMOL/L (ref 3.4–5.3)
SODIUM SERPL-SCNC: 139 MMOL/L (ref 133–144)

## 2022-08-17 PROCEDURE — 80048 BASIC METABOLIC PNL TOTAL CA: CPT

## 2022-08-17 PROCEDURE — 70553 MRI BRAIN STEM W/O & W/DYE: CPT

## 2022-08-17 PROCEDURE — 255N000002 HC RX 255 OP 636: Performed by: NEUROLOGICAL SURGERY

## 2022-08-17 PROCEDURE — 36415 COLL VENOUS BLD VENIPUNCTURE: CPT

## 2022-08-17 PROCEDURE — 258N000003 HC RX IP 258 OP 636: Performed by: NEUROLOGICAL SURGERY

## 2022-08-17 PROCEDURE — 70546 MR ANGIOGRAPH HEAD W/O&W/DYE: CPT

## 2022-08-17 PROCEDURE — A9585 GADOBUTROL INJECTION: HCPCS | Performed by: NEUROLOGICAL SURGERY

## 2022-08-17 RX ORDER — GADOBUTROL 604.72 MG/ML
8 INJECTION INTRAVENOUS ONCE
Status: DISCONTINUED | OUTPATIENT
Start: 2022-08-17 | End: 2022-08-17

## 2022-08-17 RX ORDER — GADOBUTROL 604.72 MG/ML
10 INJECTION INTRAVENOUS ONCE
Status: COMPLETED | OUTPATIENT
Start: 2022-08-17 | End: 2022-08-17

## 2022-08-17 RX ADMIN — SODIUM CHLORIDE 50 ML: 9 INJECTION, SOLUTION INTRAVENOUS at 15:17

## 2022-08-17 RX ADMIN — GADOBUTROL 10 ML: 604.72 INJECTION INTRAVENOUS at 15:16

## 2022-08-18 NOTE — TELEPHONE ENCOUNTER
BMP 8/17/22: all within normal limits with exception on glucose - elevated at 126 mg/dL    8/17/22 MRV Brain:  IMPRESSION:   1. Metallic embolization material following treatment of dural aVF in the vicinity of the left lateral transverse sinus. Transverse sinus is patent, although it is small in size medially. Vein of Storm is patent which feeds into a slightly larger caliber lateral left transverse sinus and left sigmoid sinus. Appearance is similar to prior CTV 5/13/2022.  2. Remaining dural venous sinuses remain patent.    8/17/22 MRI Brain:  IMPRESSION:    1. 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  2. Small probable subacute infarct in the right frontal white matter which is new since 1/6/2022.   3. No other acute intracranial abnormality.    Routed to Marcos Baldwin RN, to have Dr. Zapata review and contact patient with results. Lianna Woodson RN 8/18/2022 4:40 PM

## 2022-09-06 ENCOUNTER — TELEPHONE (OUTPATIENT)
Dept: NEUROLOGY | Facility: CLINIC | Age: 47
End: 2022-09-06

## 2022-09-06 NOTE — TELEPHONE ENCOUNTER
MRI/MRV and BMP done 8/17/22 reviewed by Dr. Zapata - scans look good. Follow-up angio in 1 year for more definitive results. May do MRA if patient does not wish to do angio however may still need angio based on MRA result.     [facility] informing patient MyChart message will be sent. Encouraged to return call with questions. Lianna Woodson RN 9/6/2022 4:06 PM

## 2022-10-03 ENCOUNTER — HEALTH MAINTENANCE LETTER (OUTPATIENT)
Age: 47
End: 2022-10-03

## 2023-01-08 ENCOUNTER — HOSPITAL ENCOUNTER (EMERGENCY)
Facility: CLINIC | Age: 48
Discharge: HOME | End: 2023-01-08
Attending: NURSE PRACTITIONER | Admitting: NURSE PRACTITIONER
Payer: COMMERCIAL

## 2023-01-08 VITALS
OXYGEN SATURATION: 97 % | SYSTOLIC BLOOD PRESSURE: 122 MMHG | RESPIRATION RATE: 20 BRPM | TEMPERATURE: 98.2 F | HEART RATE: 93 BPM | DIASTOLIC BLOOD PRESSURE: 65 MMHG

## 2023-01-08 DIAGNOSIS — M70.22 OLECRANON BURSITIS OF LEFT ELBOW: ICD-10-CM

## 2023-01-08 PROCEDURE — 99213 OFFICE O/P EST LOW 20 MIN: CPT | Performed by: NURSE PRACTITIONER

## 2023-01-08 PROCEDURE — G0463 HOSPITAL OUTPT CLINIC VISIT: HCPCS | Performed by: NURSE PRACTITIONER

## 2023-01-08 ASSESSMENT — ENCOUNTER SYMPTOMS: JOINT SWELLING: 1

## 2023-02-11 ENCOUNTER — HEALTH MAINTENANCE LETTER (OUTPATIENT)
Age: 48
End: 2023-02-11

## 2023-06-16 ENCOUNTER — TELEPHONE (OUTPATIENT)
Dept: NEUROLOGY | Facility: CLINIC | Age: 48
End: 2023-06-16
Payer: COMMERCIAL

## 2023-06-16 DIAGNOSIS — I77.0 AVF (ARTERIOVENOUS FISTULA) (H): Primary | ICD-10-CM

## 2023-06-16 NOTE — TELEPHONE ENCOUNTER
Call placed to see if patient wants to follow-up on AVF with an MRA or a cerebral angiogram (due in August).     Per Dr. Zapata the angiogram will provide more definitive results. If the MRA is not well visualized we would still need to do an angiogram.     Spoke with patient. He would prefer to do MRA. Order placed. Will provide imaging scheduling number for [place] ([phone]) via [employer] and have scheduling call to arrange appointment with Dr. Zapata. Patient verbalized understanding and in agreement. Patient has my contact information and was encouraged to call with questions/concerns. Lianna Woodson RN 6/16/2023 9:53 AM

## 2023-06-19 ENCOUNTER — TELEPHONE (OUTPATIENT)
Dept: NEUROSURGERY | Facility: CLINIC | Age: 48
End: 2023-06-19
Payer: COMMERCIAL

## 2023-06-20 ENCOUNTER — TELEPHONE (OUTPATIENT)
Dept: NEUROSURGERY | Facility: CLINIC | Age: 48
End: 2023-06-20
Payer: COMMERCIAL

## 2023-08-02 ENCOUNTER — OFFICE VISIT (OUTPATIENT)
Dept: URGENT CARE | Facility: URGENT CARE | Age: 48
End: 2023-08-02
Payer: COMMERCIAL

## 2023-08-02 VITALS
HEART RATE: 83 BPM | OXYGEN SATURATION: 99 % | DIASTOLIC BLOOD PRESSURE: 84 MMHG | WEIGHT: 169.5 LBS | RESPIRATION RATE: 16 BRPM | SYSTOLIC BLOOD PRESSURE: 125 MMHG | TEMPERATURE: 97.9 F | BODY MASS INDEX: 25.77 KG/M2

## 2023-08-02 DIAGNOSIS — R00.2 PALPITATIONS: ICD-10-CM

## 2023-08-02 DIAGNOSIS — R07.9 CHEST PAIN, UNSPECIFIED TYPE: Primary | ICD-10-CM

## 2023-08-02 PROCEDURE — 99214 OFFICE O/P EST MOD 30 MIN: CPT | Mod: 25 | Performed by: PHYSICIAN ASSISTANT

## 2023-08-02 PROCEDURE — 93000 ELECTROCARDIOGRAM COMPLETE: CPT | Performed by: PHYSICIAN ASSISTANT

## 2023-08-02 ASSESSMENT — ENCOUNTER SYMPTOMS
SPEECH DIFFICULTY: 0
SHORTNESS OF BREATH: 0
COUGH: 0
MUSCULOSKELETAL NEGATIVE: 1
VOMITING: 0
HEADACHES: 0
FEVER: 0
HEMATURIA: 0
ALLERGIC/IMMUNOLOGIC NEGATIVE: 1
CHILLS: 0
MYALGIAS: 0
NAUSEA: 0
WEAKNESS: 0
CHEST TIGHTNESS: 0
GASTROINTESTINAL NEGATIVE: 1
DYSURIA: 0
DIARRHEA: 0
FREQUENCY: 0
ABDOMINAL PAIN: 0
LIGHT-HEADEDNESS: 1
SORE THROAT: 0
FACIAL ASYMMETRY: 0
RESPIRATORY NEGATIVE: 1
WHEEZING: 0
PALPITATIONS: 1
NUMBNESS: 0
CONSTITUTIONAL NEGATIVE: 1

## 2023-08-02 ASSESSMENT — PAIN SCALES - GENERAL: PAINLEVEL: MILD PAIN (2)

## 2023-08-02 NOTE — PROGRESS NOTES
"Chief Complaint:    Chief Complaint   Patient presents with    Chest Pain     Chest pain beginning 40 minutes ago; patient states he had a \"heart flutter\" that was abnormal this morning. Patient reports dizziness and trying to \"catch breath.\"      Medical Decision Making:    Vital signs reviewed by Arsenio Nguyen PA-C  /84 (BP Location: Left arm, Patient Position: Sitting, Cuff Size: Adult Regular)   Pulse 83   Temp 97.9  F (36.6  C) (Tympanic)   Resp 16   Wt 76.9 kg (169 lb 8 oz)   SpO2 99%   BMI 25.77 kg/m      Differential Diagnosis:  PVCs, PACs, electrolyte imbalance, atrial flutter, atrial fibrillation, ACS, pulmonary embolism, pericarditis, myocarditis, aortic dissection.      ASSESSMENT:     1. Chest pain, unspecified type    2. Palpitations           PLAN:  Percy is a 47-year-old male with past medical history of arteriovenous fistula who presents to urgent care for evaluation of chest pain and palpitations.  Patient states that 45 minutes prior to arrival he was standing at his desk at home when he experienced sudden onset of heart racing, heart skipping beats with associated 1 out of 10 left-sided, nonradiating chest pain and a lightheaded sensation.  He did not lose consciousness and denies any associated nausea, vomiting, paresthesias to the extremities, neck pain, jaw pain.  Patient states that the palpitations and lightheadedness lasted for less than 10 seconds and then resolved after sitting down; however, the 1 out of 10 chest pain has persisted.  He has a history of heart palpitations, reports wearing a Holter monitor 1 year ago.  ECG here in urgent care shows sinus rhythm, no ST elevations or depressions.      At this time I can not rule out acute coronary syndrome.  Patient instructed to go to the ED now for further evaluation, complete cardiac workup, and possible cardiology consult.    Follow up with PCP in the next 2 weeks.    Patient verbalized understanding and agreed with " this plan.    Labs:     No results found for any visits on 08/02/23.    Current Meds:    Current Outpatient Medications:     acetaminophen (TYLENOL) 500 MG tablet, Take 1,000 mg by mouth every 6 hours as needed for mild pain, Disp: , Rfl:     acetylcysteine (N-ACETYL CYSTEINE) 600 MG CAPS capsule, Take 600 mg by mouth daily, Disp: , Rfl:     Ascorbic Acid (VITAMIN C) 500 MG CAPS, Take 1 capsule by mouth daily, Disp: , Rfl:     biotin 1000 MCG TABS tablet, Take 1,000 mcg by mouth daily, Disp: , Rfl:     calcium carbonate (TUMS) 500 MG chewable tablet, Take 1 tablet (500 mg) by mouth as needed for heartburn, Disp: , Rfl:     cholecalciferol (VITAMIN D3) 125 mcg (5000 units) capsule, Take 125 mcg by mouth daily, Disp: , Rfl:     magnesium 250 MG tablet, Take 1 tablet by mouth daily, Disp: , Rfl:     methylPREDNISolone (MEDROL DOSEPAK) 4 MG tablet therapy pack, Follow Package Directions, Disp: 21 tablet, Rfl: 0    Multiple Vitamin (MULTIVITAMIN ADULT PO), Take 1 tablet by mouth daily, Disp: , Rfl:     omeprazole (PRILOSEC OTC) 20 MG EC tablet, Take 1 tablet (20 mg) by mouth as needed, Disp: , Rfl:     Allergies:  Allergies   Allergen Reactions    Food      watermelon, almonds, walnuts.    Other Environmental Allergy      PN: LW FI1: watermelon, almonds, walnuts LW FI2: banana-scratch feeling in throat    Sulfamethoxazole-Trimethoprim Other (See Comments)     PN: mouth irritation, depressed mood  PN: mouth irritation, depressed mood      Penicillins Hives and Rash     Lumps on tongue       SUBJECTIVE    HPI: Percy is a 47-year-old male with past medical history of arteriovenous fistula who presents to urgent care for evaluation of chest pain and palpitations.  Patient states that 45 minutes prior to arrival he was standing at his desk at home when he experienced sudden onset of heart racing, heart skipping beats with associated 1 out of 10 left-sided, nonradiating chest pain and a lightheaded sensation.  He did not  lose consciousness and denies any associated nausea, vomiting, paresthesias to the extremities, neck pain, jaw pain.  Patient states that the palpitations and lightheadedness lasted for less than 10 seconds and then resolved spontaneously; however, the 1 out of 10 chest pain has persisted.  He has a history of heart palpitations, reports wearing a Holter monitor 1 year ago.     Patient states that he has been under increased stress at work with new responsibilities.  He states that he drinks 2-3 alcoholic beverages per day for the past 3 years.  He denies any drug use.  Reports that his father had atrial fibrillation.  He denies any personal history of cardiac disease.    ROS:      Review of Systems   Constitutional: Negative.  Negative for chills and fever.   HENT: Negative.  Negative for congestion and sore throat.    Respiratory: Negative.  Negative for cough, chest tightness, shortness of breath and wheezing.    Cardiovascular:  Positive for chest pain and palpitations. Negative for leg swelling.   Gastrointestinal: Negative.  Negative for abdominal pain, diarrhea, nausea and vomiting.   Genitourinary:  Negative for dysuria, frequency, hematuria and urgency.   Musculoskeletal: Negative.  Negative for myalgias.   Skin:  Negative for rash.   Allergic/Immunologic: Negative.  Negative for immunocompromised state.   Neurological:  Positive for light-headedness. Negative for facial asymmetry, speech difficulty, weakness, numbness and headaches.        Family History   Family History   Problem Relation Age of Onset    Deep Vein Thrombosis Mother     Neurologic Disorder Mother     Heart Disease Father     Aneurysm Father     Polycystic Kidney Diease Father     Atrial fibrillation Father     Kidney failure Father     LUNG DISEASE Brother     Polycystic Kidney Diease Brother     Anesthesia Reaction No family hx of        Social History  Social History     Socioeconomic History    Marital status:      Spouse name:  Not on file    Number of children: 2    Years of education: Not on file    Highest education level: Not on file   Occupational History    Occupation: sales   Tobacco Use    Smoking status: Never     Passive exposure: Never    Smokeless tobacco: Never   Vaping Use    Vaping Use: Never used   Substance and Sexual Activity    Alcohol use: Yes     Comment: A few drinks per week, mostly beer;    Drug use: Never    Sexual activity: Yes     Partners: Female   Other Topics Concern    Not on file   Social History Narrative    Not on file     Social Determinants of Health     Financial Resource Strain: Not on file   Food Insecurity: Not on file   Transportation Needs: Not on file   Physical Activity: Not on file   Stress: Not on file   Social Connections: Not on file   Intimate Partner Violence: Not on file   Housing Stability: Not on file        Surgical History:  Past Surgical History:   Procedure Laterality Date    IR CAROTID CEREBRAL ANGIOGRAM BILATERAL  2/23/2022    IR CAROTID CEREBRAL ANGIOGRAM BILATERAL  5/12/2022    TONSILLECTOMY          Problem List:  Patient Active Problem List   Diagnosis    Acute respiratory failure with hypoxia (H)    Pneumonia due to 2019 novel coronavirus    LFT elevation    Hypokalemia    AVF (arteriovenous fistula) (H)           OBJECTIVE:     Vital signs noted and reviewed by Arsenio Nguyen PA-C  /84 (BP Location: Left arm, Patient Position: Sitting, Cuff Size: Adult Regular)   Pulse 83   Temp 97.9  F (36.6  C) (Tympanic)   Resp 16   Wt 76.9 kg (169 lb 8 oz)   SpO2 99%   BMI 25.77 kg/m       PEFR:    Physical Exam  Vitals and nursing note reviewed.   Constitutional:       General: He is not in acute distress.     Appearance: He is well-developed. He is not ill-appearing, toxic-appearing or diaphoretic.   HENT:      Head: Normocephalic and atraumatic.      Right Ear: Hearing, tympanic membrane, ear canal and external ear normal. Tympanic membrane is not perforated,  erythematous, retracted or bulging.      Left Ear: Hearing, tympanic membrane, ear canal and external ear normal. Tympanic membrane is not perforated, erythematous, retracted or bulging.      Nose: Nose normal. No mucosal edema, congestion or rhinorrhea.      Mouth/Throat:      Pharynx: No oropharyngeal exudate or posterior oropharyngeal erythema.      Tonsils: No tonsillar exudate or tonsillar abscesses. 0 on the right. 0 on the left.   Eyes:      Extraocular Movements: Extraocular movements intact.      Pupils: Pupils are equal, round, and reactive to light.   Cardiovascular:      Rate and Rhythm: Normal rate and regular rhythm.      Pulses:           Dorsalis pedis pulses are 2+ on the right side.        Posterior tibial pulses are 2+ on the right side.      Heart sounds: Normal heart sounds, S1 normal and S2 normal. Heart sounds not distant. No murmur heard.     No friction rub. No gallop.   Pulmonary:      Effort: Pulmonary effort is normal. No respiratory distress.      Breath sounds: Normal breath sounds. No decreased breath sounds, wheezing, rhonchi or rales.   Abdominal:      General: Bowel sounds are normal. There is no distension.      Palpations: Abdomen is soft.      Tenderness: There is no abdominal tenderness.   Musculoskeletal:      Cervical back: Normal range of motion and neck supple.      Right lower leg: No edema.      Left lower leg: No edema.   Lymphadenopathy:      Cervical: No cervical adenopathy.   Skin:     General: Skin is warm and dry.      Findings: No rash.   Neurological:      Mental Status: He is alert.      Cranial Nerves: No cranial nerve deficit.   Psychiatric:         Attention and Perception: He is attentive.         Speech: Speech normal.         Behavior: Behavior normal. Behavior is cooperative.         Thought Content: Thought content normal.         Judgment: Judgment normal.             Arsenio Nguyen PA-C  8/2/2023, 10:31 AM

## 2023-08-07 ENCOUNTER — ANCILLARY PROCEDURE (OUTPATIENT)
Dept: MRI IMAGING | Facility: CLINIC | Age: 48
End: 2023-08-07
Attending: NEUROLOGICAL SURGERY
Payer: COMMERCIAL

## 2023-08-07 DIAGNOSIS — I77.0 AVF (ARTERIOVENOUS FISTULA) (H): ICD-10-CM

## 2023-08-07 PROCEDURE — 70544 MR ANGIOGRAPHY HEAD W/O DYE: CPT | Mod: GC | Performed by: RADIOLOGY

## 2023-08-15 ENCOUNTER — VIRTUAL VISIT (OUTPATIENT)
Dept: NEUROSURGERY | Facility: CLINIC | Age: 48
End: 2023-08-15
Payer: COMMERCIAL

## 2023-08-15 DIAGNOSIS — I67.1 CEREBROVASCULAR DURAL AV FISTULA: Primary | ICD-10-CM

## 2023-08-15 DIAGNOSIS — R01.1 HEART MURMUR: ICD-10-CM

## 2023-08-15 PROCEDURE — 99203 OFFICE O/P NEW LOW 30 MIN: CPT | Mod: 93 | Performed by: NEUROLOGICAL SURGERY

## 2023-08-15 ASSESSMENT — PAIN SCALES - GENERAL: PAINLEVEL: MILD PAIN (3)

## 2023-08-15 NOTE — LETTER
8/15/2023       RE: Percy Curtis  [id number]  [facility] [address]     Dear Colleague,      Thank you for referring your patient, Percy Curtis, to the [facility] [place] [facility]. Please see a copy of my visit note below.    +HA - morning headaches when waking up. Then can go a week without one. Some are sharp and behind left ear and eye. Most are behind left ear. Eyesight is worse than last year. Tinnitus resolved.   Some irritability. Was worked up for heart murmur and felt depressed afterwards due to some uncertainty. Had a Holter monitor    Dr. Calderon referral for the murmur    See dictated note.        Again, thank you for allowing me to participate in the care of your patient.      Sincerely,    Janett Zapata MD

## 2023-08-15 NOTE — NURSING NOTE
Is the patient currently in the state of [place]? YES    Visit mode:TELEPHONE    If the visit is dropped, the patient can be reconnected by: TELEPHONE VISIT: Phone number: [phone]    Will anyone else be joining the visit? NO      How would you like to obtain your AVS? MyChart    Are changes needed to the allergy or medication list? YES: Pt has changes to supplements he takes     Reason for visit: RECHECK (AVF/ MRA results)    PT states he changed the magnesium to mangesium L - threonate.     Pt talso takes creatine.    PT was feeling a little down but only lasted 2-3 days.  IT seems to happen on a cycle once a month and it lasts for a few days but then he snaps back to normal.     Sunitha Nova on 8/15/2023 at 12:12 PM

## 2023-08-15 NOTE — PROGRESS NOTES
Virtual Visit Details    Type of service:  Telephone Visit   Phone call duration: 30 minutes     +HA - morning headaches when waking up. Then can go a week without one. Some are sharp and behind left ear and eye. Most are behind left ear. Eyesight is worse than last year. Tinnitus resolved.   Some irritability. Was worked up for heart murmur and felt depressed afterwards due to some uncertainty. Had a Holter monitor    Dr. Calderon referral for the murmur    See dictated note.    WAGNER Zapata MD

## 2023-08-15 NOTE — LETTER
8/15/2023      RE: Percy Curtis  [address]  [facility] [address]       Virtual Visit Details    Type of service:  Telephone Visit   Phone call duration: 30 minutes     +HA - morning headaches when waking up. Then can go a week without one. Some are sharp and behind left ear and eye. Most are behind left ear. Eyesight is worse than last year. Tinnitus resolved.   Some irritability. Was worked up for heart murmur and felt depressed afterwards due to some uncertainty. Had a Holter monitor    Dr. Calderon referral for the murmur    See dictated note.    MD Janett Tipton MD

## 2023-08-16 NOTE — PATIENT INSTRUCTIONS
Repeat MRA brain without contrast in two years or sooner if new symptoms develop.    Referral to cardiology for murmur evaluation    Medrol dose pack prescribed to take on a week when you have headaches.    Stroke & Endovascular RN Care Coordinators:    Rebecca Chavez, RN, BSN  Lianna Woodson, RN, CNRN, SCRN    If you have any questions please contact the RN Care Coordinators at [phone], option 1.     After business hours call the  at [phone] and have the Neuro-Interventional Fellow paged.    Thank you for choosing [facility] for your health care needs.

## 2023-08-16 NOTE — PROGRESS NOTES
Service Date: 08/15/2023    Mandy Satnam, APRN, CNP  [id number] [id number]    RE:  Percy Curtis  MRN:  [id number]  :  1975    Dear Ms. Hay:    We spoke to Mr. Curtis as part of a telephone followup in C[facility]brovascular Clinic on 08/15/2023.  He is over a year out from embolization of a left transverse sinus dural arteriovenous fistula.  In general, he has been functioning well.  On a positive note, his pulsatile tinnitus has resolved and there has been no recurrence.  However, he continues to have headaches since the procedure.  The headaches have a sporadic pattern and tend to occur in the morning when he first wakes up.  Some of the headaches are sharp and behind the left eye and ear.  Most are behind the left ear.  He can go up to a week without a headache, but then he can get frequent episodes at other times.  He feels that his vision is worse bilaterally compared to last year.  He has not had an eye exam in about a year.     He apparently was evaluated for a heart murmur.  It sounds like the workup was unremarkable, but he describes some uncertainty about the results.  He also has had some mood lability and irritability.  He has been trying various supplements and he inquired about specific vitamins and supplements.    Over the phone, his speech, language and phonation were normal.    REVIEW OF STUDIES:  We went over his MRA from 2023.  We compared it to his previous studies and his preoperative MRA.  There does not appear to be any obvious recurrence of the dural AVF.  Although this is an arterial study, the left sinus appears to be patent.    IMPRESSION AND PLAN:  This 30-minute visit was spent discussing his imaging and reviewing his symptoms, Ultimately, we sense that he has some frustration that he is not feeling completely normal.  Uncommonly, some patients can have longstanding headaches following embolization.  If the headaches become disabling, we can  certainly refer him to our Headache Clinic.  He asked for a cardiology referral to discuss the murmur in more detail.  We can certainly facilitate this.  He seems to have some anxiety regarding this.  We can tentatively plan on repeating an MRA of the brain in about 2 years.  We can evaluate him sooner if he redevelops tinnitus or if his headaches worsen further.      Please do not hesitate to contact us with questions.    Sincerely,    Janett Zapata MD        D: 2023   T: 2023   MT: [email or web address]    Name:     LONGORIAGEOFFREY OMERRODRIGO SINGH  MRN:      [id number]        Account:      [id number]   :      1975           Service Date: 08/15/2023       Document: [id number]

## 2023-08-17 RX ORDER — METHYLPREDNISOLONE 4 MG
TABLET, DOSE PACK ORAL
Qty: 21 TABLET | Refills: 0 | Status: SHIPPED | OUTPATIENT
Start: 2023-08-17

## 2023-08-24 ENCOUNTER — OFFICE VISIT (OUTPATIENT)
Dept: FAMILY MEDICINE | Facility: CLINIC | Age: 48
End: 2023-08-24
Payer: COMMERCIAL

## 2023-08-24 VITALS
TEMPERATURE: 98.2 F | OXYGEN SATURATION: 97 % | HEART RATE: 70 BPM | WEIGHT: 168.8 LBS | RESPIRATION RATE: 16 BRPM | BODY MASS INDEX: 26.49 KG/M2 | SYSTOLIC BLOOD PRESSURE: 136 MMHG | DIASTOLIC BLOOD PRESSURE: 89 MMHG | HEIGHT: 67 IN

## 2023-08-24 DIAGNOSIS — Z12.11 SCREEN FOR COLON CANCER: ICD-10-CM

## 2023-08-24 DIAGNOSIS — Z00.00 ROUTINE GENERAL MEDICAL EXAMINATION AT A HEALTH CARE FACILITY: Primary | ICD-10-CM

## 2023-08-24 DIAGNOSIS — I67.1 CEREBROVASCULAR DURAL AV FISTULA: ICD-10-CM

## 2023-08-24 DIAGNOSIS — E55.9 VITAMIN D DEFICIENCY: ICD-10-CM

## 2023-08-24 DIAGNOSIS — R01.1 HEART MURMUR: ICD-10-CM

## 2023-08-24 LAB
ALBUMIN SERPL BCG-MCNC: 4.9 G/DL (ref 3.5–5.2)
ALP SERPL-CCNC: 104 U/L (ref 40–129)
ALT SERPL W P-5'-P-CCNC: 25 U/L (ref 0–70)
ANION GAP SERPL CALCULATED.3IONS-SCNC: 13 MMOL/L (ref 7–15)
AST SERPL W P-5'-P-CCNC: 27 U/L (ref 0–45)
BILIRUB SERPL-MCNC: 0.8 MG/DL
BUN SERPL-MCNC: 15.2 MG/DL (ref 6–20)
CALCIUM SERPL-MCNC: 10.2 MG/DL (ref 8.6–10)
CHLORIDE SERPL-SCNC: 103 MMOL/L (ref 98–107)
CHOLEST SERPL-MCNC: 197 MG/DL
CREAT SERPL-MCNC: 1.22 MG/DL (ref 0.67–1.17)
D DIMER PPP FEU-MCNC: <0.27 UG/ML FEU (ref 0–0.5)
DEPRECATED CALCIDIOL+CALCIFEROL SERPL-MC: 80 UG/L (ref 20–75)
DEPRECATED HCO3 PLAS-SCNC: 25 MMOL/L (ref 22–29)
ERYTHROCYTE [DISTWIDTH] IN BLOOD BY AUTOMATED COUNT: 12 % (ref 10–15)
GFR SERPL CREATININE-BSD FRML MDRD: 73 ML/MIN/1.73M2
GLUCOSE SERPL-MCNC: 95 MG/DL (ref 70–99)
HCT VFR BLD AUTO: 46.9 % (ref 40–53)
HDLC SERPL-MCNC: 68 MG/DL
HGB BLD-MCNC: 16.2 G/DL (ref 13.3–17.7)
LDLC SERPL CALC-MCNC: 95 MG/DL
MCH RBC QN AUTO: 29.6 PG (ref 26.5–33)
MCHC RBC AUTO-ENTMCNC: 34.5 G/DL (ref 31.5–36.5)
MCV RBC AUTO: 86 FL (ref 78–100)
NONHDLC SERPL-MCNC: 129 MG/DL
PLATELET # BLD AUTO: 330 10E3/UL (ref 150–450)
POTASSIUM SERPL-SCNC: 4.4 MMOL/L (ref 3.4–5.3)
PROT SERPL-MCNC: 7.4 G/DL (ref 6.4–8.3)
RBC # BLD AUTO: 5.48 10E6/UL (ref 4.4–5.9)
SODIUM SERPL-SCNC: 141 MMOL/L (ref 136–145)
TRIGL SERPL-MCNC: 172 MG/DL
WBC # BLD AUTO: 4.7 10E3/UL (ref 4–11)

## 2023-08-24 PROCEDURE — 99214 OFFICE O/P EST MOD 30 MIN: CPT | Mod: 25 | Performed by: FAMILY MEDICINE

## 2023-08-24 PROCEDURE — 36415 COLL VENOUS BLD VENIPUNCTURE: CPT | Performed by: FAMILY MEDICINE

## 2023-08-24 PROCEDURE — 85027 COMPLETE CBC AUTOMATED: CPT | Performed by: FAMILY MEDICINE

## 2023-08-24 PROCEDURE — 85379 FIBRIN DEGRADATION QUANT: CPT | Performed by: FAMILY MEDICINE

## 2023-08-24 PROCEDURE — 82306 VITAMIN D 25 HYDROXY: CPT | Performed by: FAMILY MEDICINE

## 2023-08-24 PROCEDURE — 99396 PREV VISIT EST AGE 40-64: CPT | Performed by: FAMILY MEDICINE

## 2023-08-24 PROCEDURE — 80053 COMPREHEN METABOLIC PANEL: CPT | Performed by: FAMILY MEDICINE

## 2023-08-24 PROCEDURE — 80061 LIPID PANEL: CPT | Performed by: FAMILY MEDICINE

## 2023-08-24 ASSESSMENT — ENCOUNTER SYMPTOMS
SORE THROAT: 0
ARTHRALGIAS: 0
EYE PAIN: 0
PALPITATIONS: 0
CHILLS: 0
ABDOMINAL PAIN: 1
COUGH: 0
DIARRHEA: 0
DYSURIA: 0
FEVER: 0
WEAKNESS: 0
HEADACHES: 1
PARESTHESIAS: 0
NERVOUS/ANXIOUS: 1
HEARTBURN: 1
DIZZINESS: 0
CONSTIPATION: 0
HEMATOCHEZIA: 0
FREQUENCY: 0
SHORTNESS OF BREATH: 0
MYALGIAS: 1
HEMATURIA: 0
JOINT SWELLING: 0
NAUSEA: 0

## 2023-08-24 ASSESSMENT — PAIN SCALES - GENERAL: PAINLEVEL: MILD PAIN (3)

## 2023-08-24 NOTE — PROGRESS NOTES
SUBJECTIVE:   CC: Percy is an 47 year old who presents for preventative health visit.       8/24/2023     8:54 AM   Additional Questions   Roomed by Katherine   Accompanied by self       Healthy Habits:     Getting at least 3 servings of Calcium per day:  Yes    Bi-annual eye exam:  Yes    Dental care twice a year:  NO    Sleep apnea or symptoms of sleep apnea:  Excessive snoring    Diet:  Regular (no restrictions)    Frequency of exercise:  2-3 days/week    Duration of exercise:  15-30 minutes    Taking medications regularly:  Yes    Medication side effects:  Not applicable    Additional concerns today:  Yes      Today's PHQ-2 Score:       8/24/2023     8:54 AM   PHQ-2 ( 1999 [employer])   Q1: Little interest or pleasure in doing things 0   Q2: Feeling down, depressed or hopeless 0   PHQ-2 Score 0   Q1: Little interest or pleasure in doing things Not at all   Q2: Feeling down, depressed or hopeless Not at all   PHQ-2 Score 0                       Social History     Tobacco Use    Smoking status: Never     Passive exposure: Never    Smokeless tobacco: Never   Substance Use Topics    Alcohol use: Yes     Comment: A few drinks per week, mostly beer;             8/24/2023     8:53 AM   Alcohol Use   Prescreen: >3 drinks/day or >7 drinks/week? Yes   AUDIT SCORE  5         8/24/2023     8:53 AM   AUDIT - Alcohol Use Disorders Identification Test - Reproduced from the World Health Organization Audit 2001 (Second Edition)   1.  How often do you have a drink containing alcohol? 4 or more times a week   2.  How many drinks containing alcohol do you have on a typical day when you are drinking? 3 or 4   3.  How often do you have five or more drinks on one occasion? Never   4.  How often during the last year have you found that you were not able to stop drinking once you had started? Never   5.  How often during the last year have you failed to do what was normally expected of you because of drinking? Never   6.  How often during  the last year have you needed a first drink in the morning to get yourself going after a heavy drinking session? Never   7.  How often during the last year have you had a feeling of guilt or remorse after drinking? Never   8.  How often during the last year have you been unable to remember what happened the night before because of your drinking? Never   9.  Have you or someone else been injured because of your drinking? No   10. Has a relative, friend, doctor or other health care worker been concerned about your drinking or suggested you cut down? No   TOTAL SCORE 5       Last PSA: No results found for: PSA    Reviewed orders with patient. Reviewed health maintenance and updated orders accordingly - Yes  Lab work is in process    Reviewed and updated as needed this visit by clinical staff   Tobacco  Allergies  Meds              Reviewed and updated as needed this visit by Provider                     Review of Systems   Constitutional:  Negative for chills and fever.   HENT:  Positive for congestion. Negative for ear pain, hearing loss and sore throat.    Eyes:  Positive for visual disturbance. Negative for pain.   Respiratory:  Negative for cough and shortness of breath.    Cardiovascular:  Negative for chest pain, palpitations and peripheral edema.   Gastrointestinal:  Positive for abdominal pain and heartburn. Negative for constipation, diarrhea, hematochezia and nausea.   Genitourinary:  Negative for dysuria, frequency, genital sores, hematuria, impotence, penile discharge and urgency.   Musculoskeletal:  Positive for myalgias. Negative for arthralgias and joint swelling.   Skin:  Negative for rash.   Neurological:  Positive for headaches. Negative for dizziness, weakness and paresthesias.   Psychiatric/Behavioral:  Positive for mood changes. The patient is nervous/anxious.          OBJECTIVE:   There were no vitals taken for this visit.    Physical Exam  GENERAL: healthy, alert and no distress  NECK: no  adenopathy, no asymmetry, masses, or scars and thyroid normal to palpation  RESP: lungs clear to auscultation - no rales, rhonchi or wheezes  CV: regular rate and rhythm, normal S1 S2, no S3 or S4, no murmur, click or rub, no peripheral edema and peripheral pulses strong  ABDOMEN: soft, nontender, no hepatosplenomegaly, no masses and bowel sounds normal  MS: no gross musculoskeletal defects noted, no edema    Diagnostic Test Results:  Labs reviewed in Epic    ASSESSMENT/PLAN:   (Z00.00) Routine general medical examination at a health care facility  (primary encounter diagnosis)  -Vitals stable.  Depression screening normal.  Plan: CBC with platelets, Comprehensive metabolic         panel (BMP + Alb, Alk Phos, ALT, AST, Total.         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  -Following with neurosurgery and neurology for headache.  -Preferred getting D-dimer.  -Denied chest pain/breathing difficulty/calf pain.  Had history of severe COVID-pneumonia, currently stable.    Plan: D dimer, quantitative      (R01.1) Heart murmur  -Cardiology referral has been sent by neurosurgeon.  -Denied chest pain on exertion/shortness of breath/dizziness/PND/orthopnea/presyncope/syncopal episodes.    Plan: Echocardiogram Complete          (E55.9) Vitamin D deficiency  -On vitamin D supplements.  Plan: Vitamin D Deficiency        (Z12.11) Screen for colon cancer  -Denied colonoscopy.  Preferred FIT.  Plan: Fecal colorectal cancer screen (FIT)       Patient has been advised of split billing requirements and indicates understanding: Yes      COUNSELING:   Reviewed preventive health counseling, as reflected in patient instructions       Regular exercise       Healthy diet/nutrition        He reports that he has never smoked. He has never been exposed to tobacco smoke. He has  never used smokeless tobacco.            Tera Pulido MD  [facility] [place]  Answers submitted by the patient for this visit:  Annual Preventive Visit (Submitted on 8/24/2023)  Chief Complaint: Annual Exam:  Frequency of exercise:: 2-3 days/week  Getting at least 3 servings of Calcium per day:: Yes  Diet:: Regular (no restrictions)  Taking medications regularly:: Yes  Medication side effects:: Not applicable  Bi-annual eye exam:: Yes  Dental care twice a year:: NO  Sleep apnea or symptoms of sleep apnea:: Excessive snoring  abdominal pain: Yes  Blood in stool: No  Blood in urine: No  chest pain: No  chills: No  congestion: Yes  constipation: No  cough: No  diarrhea: No  dizziness: No  ear pain: No  eye pain: No  nervous/anxious: Yes  fever: No  frequency: No  genital sores: No  headaches: Yes  hearing loss: No  heartburn: Yes  arthralgias: No  joint swelling: No  peripheral edema: No  mood changes: Yes  myalgias: Yes  nausea: No  dysuria: No  palpitations: No  Skin sensation changes: No  sore throat: No  urgency: No  rash: No  shortness of breath: No  visual disturbance: Yes  weakness: No  impotence: No  penile discharge: No  Additional concerns today:: Yes  Exercise outside of work (Submitted on 8/24/2023)  Chief Complaint: Annual Exam:  Duration of exercise:: 15-30 minutes

## 2023-08-25 DIAGNOSIS — R79.89 ELEVATED SERUM CREATININE: Primary | ICD-10-CM

## 2023-08-29 ENCOUNTER — TELEPHONE (OUTPATIENT)
Dept: FAMILY MEDICINE | Facility: CLINIC | Age: 48
End: 2023-08-29
Payer: COMMERCIAL

## 2023-08-29 DIAGNOSIS — Z78.9 TAKES DIETARY SUPPLEMENTS: ICD-10-CM

## 2023-08-29 DIAGNOSIS — E78.1 HYPERTRIGLYCERIDEMIA: ICD-10-CM

## 2023-08-29 DIAGNOSIS — E67.8 EXCESSIVE VITAMIN INTAKE: Primary | ICD-10-CM

## 2023-08-29 NOTE — LETTER
August 29, 2023    To  Percy Curtis  [address] [address] [facility] [address]    Your team at [facility] cares about your health. We have reviewed your chart and based on our findings; we are making the following recommendations to better manage your health.     You are in particular need of attention regarding the following:     Colon cancer is now the second leading cause of cancer-related deaths in the [place] for both men and women and there are over 130,000 new cases and 50,000 deaths per year from colon cancer. Colonoscopies can prevent 90-95% of these deaths. Problem lesions can be removed before they ever become cancer. This test is not only looking for cancer, but also getting rid of precancerous lesions.   Please call [phone] to schedule a colonoscopy. Order was placed at your last appointment.    If you have already completed these items, please contact the clinic via phone or   Telelogoshart so your care team can review and update your records. Thank you for   choosing [facility] for your healthcare needs. For any questions,   concerns, or to schedule an appointment please contact our clinic.    Healthy Regards,      Your [facility] Care Team

## 2023-08-29 NOTE — TELEPHONE ENCOUNTER
Patient Quality Outreach    Patient is due for the following:   Colon Cancer Screening    Next Steps:   Schedule colonoscopy    Type of outreach:    Sent letter.    Next Steps:  Reach out within 90 days via Letter.    Max number of attempts reached: No. Will try again in 90 days if patient still on fail list.    Questions for provider review:    None           Sally Johnson [facility]

## 2023-08-30 LAB — HEMOCCULT STL QL IA: NEGATIVE

## 2023-08-30 PROCEDURE — 82274 ASSAY TEST FOR BLOOD FECAL: CPT | Performed by: FAMILY MEDICINE

## 2023-09-28 ENCOUNTER — LAB (OUTPATIENT)
Dept: LAB | Facility: CLINIC | Age: 48
End: 2023-09-28
Payer: COMMERCIAL

## 2023-09-28 DIAGNOSIS — R79.89 ELEVATED SERUM CREATININE: ICD-10-CM

## 2023-09-28 LAB
ANION GAP SERPL CALCULATED.3IONS-SCNC: 10 MMOL/L (ref 7–15)
BUN SERPL-MCNC: 15.5 MG/DL (ref 6–20)
CALCIUM SERPL-MCNC: 9.7 MG/DL (ref 8.6–10)
CHLORIDE SERPL-SCNC: 105 MMOL/L (ref 98–107)
CREAT SERPL-MCNC: 1.04 MG/DL (ref 0.67–1.17)
EGFRCR SERPLBLD CKD-EPI 2021: 88 ML/MIN/1.73M2
GLUCOSE SERPL-MCNC: 94 MG/DL (ref 70–99)
HCO3 SERPL-SCNC: 26 MMOL/L (ref 22–29)
POTASSIUM SERPL-SCNC: 4.3 MMOL/L (ref 3.4–5.3)
SODIUM SERPL-SCNC: 141 MMOL/L (ref 135–145)

## 2023-09-28 PROCEDURE — 36415 COLL VENOUS BLD VENIPUNCTURE: CPT

## 2023-09-28 PROCEDURE — 80048 BASIC METABOLIC PNL TOTAL CA: CPT

## 2023-10-30 ENCOUNTER — OFFICE VISIT (OUTPATIENT)
Dept: DERMATOLOGY | Facility: CLINIC | Age: 48
End: 2023-10-30
Payer: COMMERCIAL

## 2023-10-30 DIAGNOSIS — D22.9 NEVUS: ICD-10-CM

## 2023-10-30 DIAGNOSIS — L82.1 SEBORRHEIC KERATOSIS: ICD-10-CM

## 2023-10-30 DIAGNOSIS — L81.4 LENTIGO: ICD-10-CM

## 2023-10-30 DIAGNOSIS — D18.01 ANGIOMA OF SKIN: ICD-10-CM

## 2023-10-30 DIAGNOSIS — L40.8 SEBOPSORIASIS: Primary | ICD-10-CM

## 2023-10-30 PROCEDURE — 99204 OFFICE O/P NEW MOD 45 MIN: CPT | Performed by: PHYSICIAN ASSISTANT

## 2023-10-30 RX ORDER — FLUOCINONIDE TOPICAL SOLUTION USP, 0.05% 0.5 MG/ML
SOLUTION TOPICAL
Qty: 50 ML | Refills: 3 | Status: SHIPPED | OUTPATIENT
Start: 2023-10-30

## 2023-10-30 ASSESSMENT — PAIN SCALES - GENERAL: PAINLEVEL: NO PAIN (0)

## 2023-10-30 NOTE — PROGRESS NOTES
HPI:   Chief complaints: Percy Curtis is a pleasant 48 year old male who presents for above waist skin cancer screening to rule out skin cancer   Last Skin Exam: n/a      1st Baseline: yes  Personal HX of Skin Cancer: no   Personal HX of Malignant Melanoma: no   Family HX of Skin Cancer / Malignant Melanoma: yes father with skin cancer; possibly mother as well  Personal HX of Atypical Moles:   no  Risk factors: history of sun exposure and burns  New / Changing lesions: yes few spots  Social History:   On review of systems, there are no further skin complaints, patient is feeling otherwise well.   ROS of the following were done and are negative: Constitutional, Eyes, Ears, Nose,   Mouth, Throat, Cardiovascular, Respiratory, GI, Genitourinary, Musculoskeletal,   Psychiatric, Endocrine, Allergic/Immunologic.    PHYSICAL EXAM:   There were no vitals taken for this visit.  Skin exam performed as follows: Type 2 skin. Mood appropriate  Alert and Oriented X 3. Well developed, well nourished in no distress.  General appearance: Normal  Head including face: Normal  Eyes: conjunctiva and lids: Normal  Mouth: Lips, teeth, gums: Normal  Neck: Normal  Chest-breast/axillae: Normal  Back: Normal  Extremities: digits/nails (clubbing): Normal  Eccrine and Apocrine glands: Normal  Right upper extremity: Normal  Left upper extremity: Normal  Right lower extremity: Normal  Left lower extremity: Normal  Skin: Scalp and body hair: See below    Pt deferred exam of breasts, groin, buttocks: YES    Other physical findings:  1. Multiple pigmented macules on extremities and trunk  2. Multiple pigmented macules on face, trunk and extremities  3. Multiple vascular papules on trunk, arms and legs  4. Multiple scattered keratotic plaques  5. Flaking on the scalp       Except as noted above, no other signs of skin cancer or melanoma.     ASSESSMENT/PLAN:   Benign Above Waist skin cancer screening today. . Patient with history of  none  Advised on monthly self exams and 1 year  Patient Education: Appropriate brochures given.    Multiple benign appearing melanocytic nevi on arms, legs and trunk. Discussed ABCDEs of melanoma and sunscreen.   Multiple lentigos on arms, legs and trunk. Advised benign, no treatment needed.  Multiple scattered angiomas. Advised benign, no treatment needed. Bothersome angiomas on the forehead - PDL would be better for these which we do not have here. [facility]   Seborrheic keratosis on arms, legs and trunk. Advised benign, no treatment needed.  Sebopsoriasis on the scalp  --Start lidex BID x 1-2 weeks PRN flares          Follow-up: FSE every 2-3 years/PRN sooner    1.) Patient was asked about new and changing moles. YES  2.) Patient received a complete physical skin examination: YES  3.) Patient was counseled to perform a monthly self skin examination: YES  Scribed By: Bambi Mccray MS, PA-C

## 2023-10-30 NOTE — LETTER
10/30/2023         RE: Percy Curtis  [address] [place] [place] [address]        Dear Colleague,    Thank you for referring your patient, Percy Curtis, to the [facility]. Please see a copy of my visit note below.    HPI:   Chief complaints: Percy Curtis is a pleasant 48 year old male who presents for above waist skin cancer screening to rule out skin cancer   Last Skin Exam: n/a      1st Baseline: yes  Personal HX of Skin Cancer: no   Personal HX of Malignant Melanoma: no   Family HX of Skin Cancer / Malignant Melanoma: yes father with skin cancer; possibly mother as well  Personal HX of Atypical Moles:   no  Risk factors: history of sun exposure and burns  New / Changing lesions: yes few spots  Social History:   On review of systems, there are no further skin complaints, patient is feeling otherwise well.   ROS of the following were done and are negative: Constitutional, Eyes, Ears, Nose,   Mouth, Throat, Cardiovascular, Respiratory, GI, Genitourinary, Musculoskeletal,   Psychiatric, Endocrine, Allergic/Immunologic.    PHYSICAL EXAM:   There were no vitals taken for this visit.  Skin exam performed as follows: Type 2 skin. Mood appropriate  Alert and Oriented X 3. Well developed, well nourished in no distress.  General appearance: Normal  Head including face: Normal  Eyes: conjunctiva and lids: Normal  Mouth: Lips, teeth, gums: Normal  Neck: Normal  Chest-breast/axillae: Normal  Back: Normal  Extremities: digits/nails (clubbing): Normal  Eccrine and Apocrine glands: Normal  Right upper extremity: Normal  Left upper extremity: Normal  Right lower extremity: Normal  Left lower extremity: Normal  Skin: Scalp and body hair: See below    Pt deferred exam of breasts, groin, buttocks: YES    Other physical findings:  1. Multiple pigmented macules on extremities and trunk  2. Multiple pigmented macules on face, trunk and extremities  3. Multiple vascular papules on  trunk, arms and legs  4. Multiple scattered keratotic plaques  5. Flaking on the scalp       Except as noted above, no other signs of skin cancer or melanoma.     ASSESSMENT/PLAN:   Benign Above Waist skin cancer screening today. . Patient with history of none  Advised on monthly self exams and 1 year  Patient Education: Appropriate brochures given.    Multiple benign appearing melanocytic nevi on arms, legs and trunk. Discussed ABCDEs of melanoma and sunscreen.   Multiple lentigos on arms, legs and trunk. Advised benign, no treatment needed.  Multiple scattered angiomas. Advised benign, no treatment needed. Bothersome angiomas on the forehead - PDL would be better for these which we do not have here. [facility]   Seborrheic keratosis on arms, legs and trunk. Advised benign, no treatment needed.  Sebopsoriasis on the scalp  --Start lidex BID x 1-2 weeks PRN flares          Follow-up: FSE every 2-3 years/PRN sooner    1.) Patient was asked about new and changing moles. YES  2.) Patient received a complete physical skin examination: YES  3.) Patient was counseled to perform a monthly self skin examination: YES  Scribed By: Bambi Mccray, MS, PACarrilloC      Again, thank you for allowing me to participate in the care of your patient.        Sincerely,        Bambi Mccray PA-C

## 2023-11-01 ENCOUNTER — OFFICE VISIT (OUTPATIENT)
Dept: CARDIOLOGY | Facility: CLINIC | Age: 48
End: 2023-11-01
Attending: NEUROLOGICAL SURGERY
Payer: COMMERCIAL

## 2023-11-01 ENCOUNTER — HOSPITAL ENCOUNTER (OUTPATIENT)
Dept: CARDIOLOGY | Facility: CLINIC | Age: 48
Discharge: HOME | End: 2023-11-01
Attending: INTERNAL MEDICINE | Admitting: INTERNAL MEDICINE
Payer: COMMERCIAL

## 2023-11-01 VITALS
HEART RATE: 70 BPM | SYSTOLIC BLOOD PRESSURE: 122 MMHG | RESPIRATION RATE: 16 BRPM | OXYGEN SATURATION: 99 % | DIASTOLIC BLOOD PRESSURE: 86 MMHG | WEIGHT: 168 LBS | BODY MASS INDEX: 26.06 KG/M2 | TEMPERATURE: 96.7 F

## 2023-11-01 DIAGNOSIS — R01.1 HEART MURMUR: ICD-10-CM

## 2023-11-01 PROCEDURE — 93244 EXT ECG>48HR<7D REV&INTERPJ: CPT | Performed by: INTERNAL MEDICINE

## 2023-11-01 PROCEDURE — 99204 OFFICE O/P NEW MOD 45 MIN: CPT | Performed by: INTERNAL MEDICINE

## 2023-11-01 PROCEDURE — 93242 EXT ECG>48HR<7D RECORDING: CPT

## 2023-11-01 NOTE — LETTER
11/1/2023    Mandy Hay, APRN CNP  [address] [address] [facility] [address]    RE: Percy Curtis       Dear Colleague,     I had the pleasure of seeing Percy Curtis in the [facility].  CARDIOLOGY CLINIC CONSULTATION    PRIMARY CARE PHYSICIAN:  Mandy Hay    HISTORY OF PRESENT ILLNESS:  This is a nice 48-year-old gentleman who is seeking cardiovascular consultation because he was told by his PCP that he has a murmur.  The patient has had a history of palpitations about 2 years ago after a bad COVID infection.  He had a ZIO monitor where he had 76 triggered events but no significant clinically relevant arrhythmias noted besides rare PACs and PVCs.  A lot of triggered events were sinus.    The patient denies any cardiovascular history.  He is functionally very active.  Exercises without any anginal symptoms.  No heart failure syncope presyncope.  He continues to have intermittent palpitations that feel like PVCs that are more worse so now.  He is seeking cardiovascular consultation because he is quite concerned about a heart murmur that was told to him by his PCP.  Lastly he wants cardiovascular screening done given his father has had heart disease.    PAST MEDICAL HISTORY:  Past Medical History:   Diagnosis Date    Acquired arteriovenous fistula (H24)     Gastroesophageal reflux disease without esophagitis     History of 2019 novel coronavirus disease (COVID-19) 10/2021       MEDICATIONS:  Current Outpatient Medications   Medication    acetylcysteine (N-ACETYL CYSTEINE) 600 MG CAPS capsule    cholecalciferol (VITAMIN D3) 125 mcg (5000 units) capsule    magnesium 250 MG tablet    acetaminophen (TYLENOL) 500 MG tablet    Ascorbic Acid (VITAMIN C) 500 MG CAPS    calcium carbonate (TUMS) 500 MG chewable tablet    fluocinonide (LIDEX) 0.05 % external solution    methylPREDNISolone (MEDROL DOSEPAK) 4 MG tablet therapy pack    methylPREDNISolone (MEDROL DOSEPAK) 4 MG  tablet therapy pack    Multiple Vitamin (MULTIVITAMIN ADULT PO)    omeprazole (PRILOSEC OTC) 20 MG EC tablet     No current facility-administered medications for this visit.       SOCIAL HISTORY:  I have reviewed this patient's social history and updated it with pertinent information if needed. Percy Curtis  reports that he has never smoked. He has never been exposed to tobacco smoke. He has never used smokeless tobacco. He reports current alcohol use. He reports that he does not use drugs.    PHYSICAL EXAM:  Temp:  [96.7  F (35.9  C)] 96.7  F (35.9  C)  Pulse:  [70] 70  Resp:  [16] 16  BP: (122)/(86) 122/86  SpO2:  [99 %] 99 %  168 lbs 0 oz    Constitutional: alert, no distress  Respiratory: Good bilateral air entry  Cardiovascular: Normal regular heart sounds very faint if any systolic murmur.  I do not hear a pathologic murmur.  No edema.  GI: nondistended  Neuropsychiatric: appropriate affact    ASSESSMENT: Pertinent issues addressed/ reviewed during this cardiology visit  Very soft likely physiologic systolic murmur  Palpitations  Cardiovascular screening    RECOMMENDATIONS:  I do not think the patient has a pathologic murmur.  He is quite concerned about it.  Recommend getting an echocardiogram.  I think this is going to be low yield and normal.  Given his palpitations which do sound like PVCs, recommend getting a 7-day Zio monitor.  The symptoms of been happening more after a bad COVID infection.  He says he continued to have significant amount of tachycardia for about 2 months after his COVID.  Now he has intermittent palpitations that are quite bothersome.  No syncope presyncope.  He would like to get a coronary calcium scan done for CV risk assessment.  Has mild hypertriglyceridemia.  Continue healthy diet exercise weight loss.    If any of these tests are abnormal, we will discuss further otherwise as needed cardiology follow-up.    It was a pleasure seeing this patient in clinic today. Please do  not hesitate to contact me with any future questions.     GILDARDO Farley, [facility]  Cardiology - [facility]  November 1, 2023    Review of the result(s) of each unique test - Last ECG CBC BMP lipids     The level of medical decision making during this visit was of moderate complexity.    This note was completed in part using dictation via the Dragon voice recognition software. Some word and grammatical errors may occur and must be interpreted in the appropriate clinical context.  If there are any questions pertaining to this issue, please contact me for further clarification.      Thank you for allowing me to participate in the care of your patient.      Sincerely,     Gary Inman MD     [facility]  cc:   Janett Zapata MD  [address] [place] [id number]

## 2023-11-01 NOTE — PROGRESS NOTES
CARDIOLOGY CLINIC CONSULTATION    PRIMARY CARE PHYSICIAN:  Mandy Hay    HISTORY OF PRESENT ILLNESS:  This is a nice 48-year-old gentleman who is seeking cardiovascular consultation because he was told by his PCP that he has a murmur.  The patient has had a history of palpitations about 2 years ago after a bad COVID infection.  He had a ZIO monitor where he had 76 triggered events but no significant clinically relevant arrhythmias noted besides rare PACs and PVCs.  A lot of triggered events were sinus.    The patient denies any cardiovascular history.  He is functionally very active.  Exercises without any anginal symptoms.  No heart failure syncope presyncope.  He continues to have intermittent palpitations that feel like PVCs that are more worse so now.  He is seeking cardiovascular consultation because he is quite concerned about a heart murmur that was told to him by his PCP.  Lastly he wants cardiovascular screening done given his father has had heart disease.    PAST MEDICAL HISTORY:  Past Medical History:   Diagnosis Date    Acquired arteriovenous fistula (H24)     Gastroesophageal reflux disease without esophagitis     History of 2019 novel coronavirus disease (COVID-19) 10/2021       MEDICATIONS:  Current Outpatient Medications   Medication    acetylcysteine (N-ACETYL CYSTEINE) 600 MG CAPS capsule    cholecalciferol (VITAMIN D3) 125 mcg (5000 units) capsule    magnesium 250 MG tablet    acetaminophen (TYLENOL) 500 MG tablet    Ascorbic Acid (VITAMIN C) 500 MG CAPS    calcium carbonate (TUMS) 500 MG chewable tablet    fluocinonide (LIDEX) 0.05 % external solution    methylPREDNISolone (MEDROL DOSEPAK) 4 MG tablet therapy pack    methylPREDNISolone (MEDROL DOSEPAK) 4 MG tablet therapy pack    Multiple Vitamin (MULTIVITAMIN ADULT PO)    omeprazole (PRILOSEC OTC) 20 MG EC tablet     No current facility-administered medications for this visit.       SOCIAL HISTORY:  I have reviewed this patient's social  history and updated it with pertinent information if needed. Percy Curtis  reports that he has never smoked. He has never been exposed to tobacco smoke. He has never used smokeless tobacco. He reports current alcohol use. He reports that he does not use drugs.    PHYSICAL EXAM:  Temp:  [96.7  F (35.9  C)] 96.7  F (35.9  C)  Pulse:  [70] 70  Resp:  [16] 16  BP: (122)/(86) 122/86  SpO2:  [99 %] 99 %  168 lbs 0 oz    Constitutional: alert, no distress  Respiratory: Good bilateral air entry  Cardiovascular: Normal regular heart sounds very faint if any systolic murmur.  I do not hear a pathologic murmur.  No edema.  GI: nondistended  Neuropsychiatric: appropriate affact    ASSESSMENT: Pertinent issues addressed/ reviewed during this cardiology visit  Very soft likely physiologic systolic murmur  Palpitations  Cardiovascular screening    RECOMMENDATIONS:  I do not think the patient has a pathologic murmur.  He is quite concerned about it.  Recommend getting an echocardiogram.  I think this is going to be low yield and normal.  Given his palpitations which do sound like PVCs, recommend getting a 7-day Zio monitor.  The symptoms of been happening more after a bad COVID infection.  He says he continued to have significant amount of tachycardia for about 2 months after his COVID.  Now he has intermittent palpitations that are quite bothersome.  No syncope presyncope.  He would like to get a coronary calcium scan done for CV risk assessment.  Has mild hypertriglyceridemia.  Continue healthy diet exercise weight loss.    If any of these tests are abnormal, we will discuss further otherwise as needed cardiology follow-up.    It was a pleasure seeing this patient in clinic today. Please do not hesitate to contact me with any future questions.     GILDARDO Farley, [facility]  Cardiology - [facility]  November 1, 2023    Review of the result(s) of each unique test - Last ECG CBC BMP lipids     The level of medical decision  making during this visit was of moderate complexity.    This note was completed in part using dictation via the Dragon voice recognition software. Some word and grammatical errors may occur and must be interpreted in the appropriate clinical context.  If there are any questions pertaining to this issue, please contact me for further clarification.

## 2023-11-13 ENCOUNTER — HOSPITAL ENCOUNTER (OUTPATIENT)
Dept: CARDIOLOGY | Facility: CLINIC | Age: 48
Discharge: HOME | End: 2023-11-13
Attending: INTERNAL MEDICINE | Admitting: INTERNAL MEDICINE
Payer: COMMERCIAL

## 2023-11-13 DIAGNOSIS — R01.1 HEART MURMUR: ICD-10-CM

## 2023-11-13 LAB — LVEF ECHO: NORMAL

## 2023-11-13 PROCEDURE — 93306 TTE W/DOPPLER COMPLETE: CPT

## 2023-11-13 PROCEDURE — 93306 TTE W/DOPPLER COMPLETE: CPT | Mod: 26 | Performed by: INTERNAL MEDICINE

## 2023-11-14 NOTE — PATIENT INSTRUCTIONS
Pulsed Dye Laser (PDL)    I will experience redness, swelling, pain, and heat sensation. I may experience bruising, itching, or acne. Risks are blistering, oozing, permanent scarring, hair loss, temporary or permanent skin lightening or darkening, infection, and eye injury. I understand my outcome could be no improvement, slight improvement. Multiple treatments may be required.    After treatment, Do Not:  Rub, scratch, or put weight on the site for 2 weeks  Wear tight fitting clothing or jewelry over the site  [place]. Keep the site out of sunlight. Use sunscreen of 30 SPF or greater when in the sun. Use sunscreen 30 minutes before going out and reapply if sweating. Tanning decreases the success of the treatment    How do I care for the treated site?  Use ice packs for 10-20 minutes after the procedure for swelling   If the site is on your face, use ice again 1 hour after treatment for ten minutes and repeat again before bed. Do not burn the skin with the ice.   If a scab or crust forms, gently cleanse the site with water. Then put on Vaseline  ointment 3 times a day and contact the clinic   If a blister forms, contact the clinic by phone  If you have concerns about swelling, call the clinic  Avoid sun exposure and do not get tan as this can darken the treated area, use sunscreen  Do not use makeup on any open wound  Do not come to your next treatment with a tan    What should I expect?  Mild swelling  Blue-purple color that may take 2 to 3 weeks to go away  Redness may also last a week or longer  Results may take up to 3 or 4 months after treatment  More procedures may be needed    Who should I call with questions?  [facility]: [phone]  [facility]: [phone]  For urgent needs outside of business hours call the [facility] at [phone] and ask for the dermatology resident on call  PPS messaging response may be delayed, please call for  urgent issues       Sun screens with Iron Oxide      Skinceuticals sunscreen, fusion, carried at the [facility] or can be mailed to you by [facility] by calling [facility]:Call [phone] and ask to have product shipped to you.     Supergoop 100% Mineral CC cream 50    [employer] Desiree Tinted Face Mineral sunscreen 60    ISDIN Eryfotona Ageless Ultralight Tinted Mineral

## 2023-11-14 NOTE — PROGRESS NOTES
[facility] Dermatology Note  Encounter Date: Nov 15, 2023  Office Visit     Dermatology Problem List:  1. Angioma   - Referral to cosmetic dermatology   2. Sebopsoriasis, scalp   - Current tx: Lidex PRN     ____________________________________________    Assessment & Plan:    # Angioma   - Discussed principal of pulsed dye laser therapy for angiomas.  Discussed to expect between 3-6 treatments spaced 4 weeks apart and then approximately every year maintenance therapy.  Discussed that this considered a cosmetic procedure and is generally an out of pocket expense.  Discussed risk of bruising (common) and blistering/scarring (rare).  Discussed the importance of daily sun protection after PDL         Procedures Performed:   NA    Follow-up PDL    Staff and Scribe:     Scribe Disclosure:   I, Yuliet Gómez ([email or web address]), am serving as a scribe to document services personally performed by this physician, Dr. Valeria Pena, based on data collection and the provider's statements to me.     Provider Disclosure:   The documentation recorded by the scribe accurately reflects the services I personally performed and the decisions made by me.    Valeria Pena MD    Department of Dermatology  [facility]: Phone: [phone], Fax:[phone]  [facility]: Phone: [phone], Fax: [phone]   ____________________________________________    CC: Derm Problem (Patient is here for angiomas on right side of forehead and one on the abdomen. The one on the abdomen itches. )    HPI:  Mr. Percy Curtis is a(n) 48 year old male who presents today as a new patient for PDL consultation.     Pt was referred by Bambi Mccray PA-C on 10/30/23 where multiple scattered angiomas were found and PDL was briefly introduced.       Patient is otherwise feeling well, without additional skin concerns.    Labs  Reviewed:  N/A    Physical Exam:  Vitals: There were no vitals taken for this visit.  SKIN: Focused examination of 2 areas  was performed.  Red papules on the abdomen and forehead  - No other lesions of concern on areas examined.     Medications:  Current Outpatient Medications   Medication    Ascorbic Acid (VITAMIN C) 500 MG CAPS    cholecalciferol (VITAMIN D3) 125 mcg (5000 units) capsule    magnesium 250 MG tablet    Multiple Vitamin (MULTIVITAMIN ADULT PO)    acetaminophen (TYLENOL) 500 MG tablet    acetylcysteine (N-ACETYL CYSTEINE) 600 MG CAPS capsule    calcium carbonate (TUMS) 500 MG chewable tablet    fluocinonide (LIDEX) 0.05 % external solution    methylPREDNISolone (MEDROL DOSEPAK) 4 MG tablet therapy pack    methylPREDNISolone (MEDROL DOSEPAK) 4 MG tablet therapy pack    omeprazole (PRILOSEC OTC) 20 MG EC tablet     No current facility-administered medications for this visit.      Past Medical History:   Patient Active Problem List   Diagnosis    Acute respiratory failure with hypoxia (H)    Pneumonia due to 2019 novel coronavirus    LFT elevation    Hypokalemia    AVF (arteriovenous fistula) (H24)     Past Medical History:   Diagnosis Date    Acquired arteriovenous fistula (H24)     Gastroesophageal reflux disease without esophagitis     History of 2019 novel coronavirus disease (COVID-19) 10/2021        GABBY Mccray PA-C  [address] [place] [address] on close of this encounter.       Laser- VBeam(Pulsed Dye Laser) Procedure Note: Cosmetic           Procedure Date: Nov 15, 2023    Attending Staff Surgeon:   Valeria Pena MD    Department of Dermatology  [facility]: Phone: [phone], Fax:[phone]  [facility]: Phone: [phone], Fax: [phone]      Operating Room Data:     Surgery/Procedure Date:    SAME     Pre-operative Diagnosis:    Forehead and abdomen    Operation/Procedure    Vbeam pulsed dye laser treatment#: 1       Post-operative Diagnosis:  SAME    Laser Settings:  Energy:11-11.5 J/cm2   14J/cm2 on abdomen on largest lesion  Spot size:3mm  Pulse width:  1.5 mS (0.45 thru 40 mS)  Dynamic cooling spray settin mS  Dynamic cooling device delay:  20 mS      Fotofinder photos: No    Anesthesia:  None    Description of Operation/Procedure:   The nature and purpose of the procedure, associated risks, possible consequences, complications and alternative methods of treatment were explained in detail, this includes but is not limited to hyperpigmentation, hypopigmentation, scarring, bruising, hair loss pain/discomfort, eye injury,  and blister. We reviewed that the outcome could be any of the following: no improvement, slight improvement or change in skin color & texture, the skin might be permanently lighter or darker, and though uncommon, superficial scarring may occur.  Multiple treatments may be recommended. T   A photo and operative consent were obtained. Time-out was performed.The patient was positioned to optimally expose the area treated. Protective eyewear was worn by the patient and goggles on all personnel in the treatment room. The patient confirmed the site to be treated. The laser energy output was verified by meter reading.      The clinically evident lesion(s) was/were treated with Bella Vbeam pulsed dye laser (595 nm) beam as above. A total of 24 pulses were used. The patient tolerated the procedure well and no complications were noted. Post operative instructions were provided. The total laser operation and preparation time was <20 minutes.  The patient was counseled to call immediately for any issues and read the after visit summary for emergency contact information. The patient was counseled that [place] messaging response may be delayed by several days, therefore, phone is preferred for emergency issues.     The  patient will follow-up in  6 months in person.            Staff Involved:      Valeria Pena MD    Department of Dermatology  [facility]: Phone: [phone], Fax:[phone]  [facility]: Phone: [phone], Fax: [phone]

## 2023-11-15 ENCOUNTER — OFFICE VISIT (OUTPATIENT)
Dept: DERMATOLOGY | Facility: CLINIC | Age: 48
End: 2023-11-15
Payer: COMMERCIAL

## 2023-11-15 DIAGNOSIS — D18.01 ANGIOMA OF SKIN: Primary | ICD-10-CM

## 2023-11-15 PROCEDURE — 96999 UNLISTED SPEC DERM SVC/PX: CPT | Performed by: DERMATOLOGY

## 2023-11-15 ASSESSMENT — PAIN SCALES - GENERAL: PAINLEVEL: NO PAIN (0)

## 2023-11-15 NOTE — NURSING NOTE
Dermatology Rooming Note    Percy Curtis's goals for this visit include:   Chief Complaint   Patient presents with    Derm Problem     Patient is here for angiomas on right side of forehead and one on the abdomen. The one on the abdomen itches.      Dermatology Laser Intake Checklist:  History of psoriasis: Yes  History of recent tan, indoor or outdoor tanning/vacation or other sun exposure: No  History of vitiligo: No  Family history of vitiligo: No  Recent other cosmetic procedure(microderm abrasion/peel/hair removal/facial etc): No  History of HSV: No  Did the patient start valtrex: No  For genital laser hair removal patient only: Is there a history of genital warts or condyloma: No  Tattoo in the area to be treated: No  Is patient using hydroquinone: No  Retinoids and other acne medications stopped for 2 weeks: No  Has the patient had accutane in the last 6-12 months: No  Pregnant or breastfeeding: No  History of skin cancer in area planned for treatment: No  History of treatment with gold: No  Changes in medical history: No  Photos obtained: Yes  Does the patient smoke: No  Is the patient on ibuprofen/aspirin/plavix/coumadin/other blood thinner: No  If patient is taking narcotic or diazepam(valium)-does patient have : No  There were no vitals taken for this visit.   Shavonne Covarrubias, visit facilitator

## 2023-11-15 NOTE — LETTER
11/15/2023       RE: Percy Curtis  [address]  [place] [address]     Dear Colleague,    Thank you for referring your patient, Percy Curtis, to the [facility] [place] at [facility]. Please see a copy of my visit note below.    [facility] Dermatology Note  Encounter Date: Nov 15, 2023  Office Visit     Dermatology Problem List:  1. Angioma   - Referral to cosmetic dermatology   2. Sebopsoriasis, scalp   - Current tx: Lidex PRN     ____________________________________________    Assessment & Plan:    # Angioma   - Discussed principal of pulsed dye laser therapy for angiomas.  Discussed to expect between 3-6 treatments spaced 4 weeks apart and then approximately every year maintenance therapy.  Discussed that this considered a cosmetic procedure and is generally an out of pocket expense.  Discussed risk of bruising (common) and blistering/scarring (rare).  Discussed the importance of daily sun protection after PDL         Procedures Performed:   NA    Follow-up PDL    Staff and Scribe:     Scribe Disclosure:   I, Yuliet Gómez ([email or web address]), am serving as a scribe to document services personally performed by this physician, Dr. Valeria Pena, based on data collection and the provider's statements to me.     Provider Disclosure:   The documentation recorded by the scribe accurately reflects the services I personally performed and the decisions made by me.    Valeria Pena MD    Department of Dermatology  [facility]: Phone: [phone], Fax:[phone]  [facility]: Phone: [phone], Fax: [phone]   ____________________________________________    CC: Derm Problem (Patient is here for angiomas on right side of forehead and one on the abdomen. The one on the abdomen itches.  )    HPI:  Mr. Percy Curtis is a(n) 48 year old male who presents today as a new patient for PDL consultation.     Pt was referred by Bambi Mccray PA-C on 10/30/23 where multiple scattered angiomas were found and PDL was briefly introduced.       Patient is otherwise feeling well, without additional skin concerns.    Labs Reviewed:  N/A    Physical Exam:  Vitals: There were no vitals taken for this visit.  SKIN: Focused examination of 2 areas  was performed.  Red papules on the abdomen and forehead  - No other lesions of concern on areas examined.     Medications:  Current Outpatient Medications   Medication    Ascorbic Acid (VITAMIN C) 500 MG CAPS    cholecalciferol (VITAMIN D3) 125 mcg (5000 units) capsule    magnesium 250 MG tablet    Multiple Vitamin (MULTIVITAMIN ADULT PO)    acetaminophen (TYLENOL) 500 MG tablet    acetylcysteine (N-ACETYL CYSTEINE) 600 MG CAPS capsule    calcium carbonate (TUMS) 500 MG chewable tablet    fluocinonide (LIDEX) 0.05 % external solution    methylPREDNISolone (MEDROL DOSEPAK) 4 MG tablet therapy pack    methylPREDNISolone (MEDROL DOSEPAK) 4 MG tablet therapy pack    omeprazole (PRILOSEC OTC) 20 MG EC tablet     No current facility-administered medications for this visit.      Past Medical History:   Patient Active Problem List   Diagnosis    Acute respiratory failure with hypoxia (H)    Pneumonia due to 2019 novel coronavirus    LFT elevation    Hypokalemia    AVF (arteriovenous fistula) (H24)     Past Medical History:   Diagnosis Date    Acquired arteriovenous fistula (H24)     Gastroesophageal reflux disease without esophagitis     History of 2019 novel coronavirus disease (COVID-19) 10/2021         Bambi Mccray PA-C  [address] [address] on close of this encounter.       Laser- VBeam(Pulsed Dye Laser) Procedure Note: Cosmetic           Procedure Date: Nov 15, 2023    Attending Staff Surgeon:   Valeria Pena MD  Associate  Professor  Department of Dermatology  [facility]: Phone: [phone], Fax:[phone]  [facility]: Phone: [phone], Fax: [phone]      Operating Room Data:     Surgery/Procedure Date:    SAME     Pre-operative Diagnosis:   Forehead and abdomen    Operation/Procedure    Vbeam pulsed dye laser treatment#: 1       Post-operative Diagnosis:  SAME    Laser Settings:  Energy:11-11.5 J/cm2   14J/cm2 on abdomen on largest lesion  Spot size:3mm  Pulse width:  1.5 mS (0.45 thru 40 mS)  Dynamic cooling spray settin mS  Dynamic cooling device delay:  20 mS      Fotofinder photos: No    Anesthesia:  None    Description of Operation/Procedure:   The nature and purpose of the procedure, associated risks, possible consequences, complications and alternative methods of treatment were explained in detail, this includes but is not limited to hyperpigmentation, hypopigmentation, scarring, bruising, hair loss pain/discomfort, eye injury,  and blister. We reviewed that the outcome could be any of the following: no improvement, slight improvement or change in skin color & texture, the skin might be permanently lighter or darker, and though uncommon, superficial scarring may occur.  Multiple treatments may be recommended. T   A photo and operative consent were obtained. Time-out was performed.The patient was positioned to optimally expose the area treated. Protective eyewear was worn by the patient and goggles on all personnel in the treatment room. The patient confirmed the site to be treated. The laser energy output was verified by meter reading.      The clinically evident lesion(s) was/were treated with Bella Vbeam pulsed dye laser (595 nm) beam as above. A total of 24 pulses were used. The patient tolerated the procedure well and no complications were noted. Post operative instructions were provided. The total  laser operation and preparation time was <20 minutes.  The patient was counseled to call immediately for any issues and read the after visit summary for emergency contact information. The patient was counseled that [place] messaging response may be delayed by several days, therefore, phone is preferred for emergency issues.     The patient will follow-up in  6 months in person.            Staff Involved:      Valeria Pena MD    Department of Dermatology  [facility]: Phone: [phone], Fax:[phone]  [facility]: Phone: [phone], Fax: [phone]

## 2023-11-21 ENCOUNTER — HOSPITAL ENCOUNTER (OUTPATIENT)
Dept: CT IMAGING | Facility: CLINIC | Age: 48
Discharge: HOME | End: 2023-11-21
Attending: INTERNAL MEDICINE | Admitting: INTERNAL MEDICINE

## 2023-11-21 DIAGNOSIS — R01.1 HEART MURMUR: ICD-10-CM

## 2023-11-21 PROCEDURE — 75571 CT HRT W/O DYE W/CA TEST: CPT

## 2023-11-21 PROCEDURE — 75571 CT HRT W/O DYE W/CA TEST: CPT | Mod: 26 | Performed by: INTERNAL MEDICINE

## 2023-11-24 NOTE — RESULT ENCOUNTER NOTE
Calcium score 1.1 placing pt in 62nd percentile (all calcium in LAD). Pt not on a statin and LDL 95 on 8/24/23. Will discuss follow up plan with Dr Inman.

## 2024-01-01 NOTE — ED PROVIDER NOTES
History     Chief Complaint   Patient presents with     Elbow Pain     HPI  Percy Curtis is a 47 year old male who presents to the urgent care for evaluation of left elbow edema. Patient noticed edema 2 days ago. No known injury but reports continuous pressure on this elbow while at work. May have had bursitis to this elbow one other time in the past. Denies fevers, erythema, tenderness, numbness and tingling.     Allergies:  Allergies   Allergen Reactions     Food      watermelon, almonds, walnuts.     Other Environmental Allergy      PN: LW FI1: watermelon, almonds, walnuts LW FI2: banana-scratch feeling in throat     Sulfamethoxazole-Trimethoprim Other (See Comments)     PN: mouth irritation, depressed mood  PN: mouth irritation, depressed mood       Penicillins Hives and Rash     Lumps on tongue       Problem List:    Patient Active Problem List    Diagnosis Date Noted     AVF (arteriovenous fistula) (H) 05/12/2022     Priority: Medium     Acute respiratory failure with hypoxia (H) 10/04/2021     Priority: Medium     Pneumonia due to 2019 novel coronavirus 10/04/2021     Priority: Medium     LFT elevation 10/04/2021     Priority: Medium     Hypokalemia 10/04/2021     Priority: Medium        Past Medical History:    Past Medical History:   Diagnosis Date     Acquired arteriovenous fistula (H)      Gastroesophageal reflux disease without esophagitis      History of 2019 novel coronavirus disease (COVID-19) 10/2021       Past Surgical History:    Past Surgical History:   Procedure Laterality Date     IR CAROTID CEREBRAL ANGIOGRAM BILATERAL  2/23/2022     IR CAROTID CEREBRAL ANGIOGRAM BILATERAL  5/12/2022     TONSILLECTOMY         Family History:    Family History   Problem Relation Age of Onset     Deep Vein Thrombosis Mother      Neurologic Disorder Mother      Heart Disease Father      Aneurysm Father      Polycystic Kidney Diease Father      Atrial fibrillation Father      Kidney failure Father       ----- Message from Rebecca L Lescher, MD sent at 2024 12:12 PM CST -----  Order placed to start home phototherapy. Please call and ensure that they have the blanket for him.  Also check to ensure that he has pooped- if not let me know and I will send in a suppository. Recheck tomorrow to ensure that he is responding with a bili panel in the office.    LUNG DISEASE Brother      Polycystic Kidney Diease Brother      Anesthesia Reaction No family hx of        Social History:  Marital Status:   [2]  Social History     Tobacco Use     Smoking status: Never     Smokeless tobacco: Never   Vaping Use     Vaping Use: Never used   Substance Use Topics     Alcohol use: Yes     Comment: A few drinks per week, mostly beer;     Drug use: Never        Medications:    acetaminophen (TYLENOL) 500 MG tablet  acetylcysteine (N-ACETYL CYSTEINE) 600 MG CAPS capsule  Ascorbic Acid (VITAMIN C) 500 MG CAPS  biotin 1000 MCG TABS tablet  calcium carbonate (TUMS) 500 MG chewable tablet  cholecalciferol (VITAMIN D3) 125 mcg (5000 units) capsule  magnesium 250 MG tablet  methylPREDNISolone (MEDROL DOSEPAK) 4 MG tablet therapy pack  Multiple Vitamin (MULTIVITAMIN ADULT PO)  omeprazole (PRILOSEC OTC) 20 MG EC tablet          Review of Systems   Musculoskeletal: Positive for joint swelling.   All other systems reviewed and are negative.      Physical Exam   BP: 122/65  Pulse: 93  Temp: 98.2  F (36.8  C)  Resp: 20  SpO2: 97 %      Physical Exam  Constitutional:       General: He is not in acute distress.     Appearance: Normal appearance.   Eyes:      Conjunctiva/sclera: Conjunctivae normal.      Pupils: Pupils are equal, round, and reactive to light.   Cardiovascular:      Rate and Rhythm: Normal rate.   Pulmonary:      Effort: Pulmonary effort is normal.   Musculoskeletal:         General: Normal range of motion.      Cervical back: Normal range of motion.      Comments: Left elbow olecranon bursitis present without evidence of infection. Pulses and perfusion equal bilaterally. No overlying abrasions.     Skin:     General: Skin is warm.      Capillary Refill: Capillary refill takes less than 2 seconds.   Neurological:      General: No focal deficit present.      Mental Status: He is alert.         ED Course                 Procedures       No results found for this or any previous  visit (from the past 24 hour(s)).    Medications - No data to display    Assessments & Plan (with Medical Decision Making)   Percy Curtis is a 47 year old male who presents to the urgent care for evaluation of left elbow edema. Patient noticed edema 2 days ago. No known injury but reports continuous pressure on this elbow while at work. Vital signs normal, physical exam as above and consistent with left elbow olecranon bursitis without evidence of infection. Discussed home management and reasons to seek reevaluation. Imaging not indicated. Discharged in good condition.     I have reviewed the nursing notes.    I have reviewed the findings, diagnosis, plan and need for follow up with the patient.    Discharge Medication List as of 1/8/2023  4:39 PM          Final diagnoses:   Olecranon bursitis of left elbow       1/8/2023   [facility] EMERGENCY DEPT     Erum Eldridge, APRN CNP  01/08/23 [id number]

## 2024-05-07 ENCOUNTER — HOSPITAL ENCOUNTER (EMERGENCY)
Facility: HOSPITAL | Age: 49
End: 2024-05-07
Payer: COMMERCIAL

## 2024-05-21 ENCOUNTER — TELEPHONE (OUTPATIENT)
Dept: DERMATOLOGY | Facility: CLINIC | Age: 49
End: 2024-05-21
Payer: COMMERCIAL

## 2024-05-21 ENCOUNTER — MYC MEDICAL ADVICE (OUTPATIENT)
Dept: DERMATOLOGY | Facility: CLINIC | Age: 49
End: 2024-05-21
Payer: COMMERCIAL

## 2024-05-21 NOTE — TELEPHONE ENCOUNTER
LVM for patient to advise him of appointment being rescheduled due to Dr. Pena being out of office // 05.21.2024 MCE

## 2024-07-25 ENCOUNTER — PATIENT OUTREACH (OUTPATIENT)
Dept: CARE COORDINATION | Facility: CLINIC | Age: 49
End: 2024-07-25
Payer: COMMERCIAL

## 2024-08-08 ENCOUNTER — PATIENT OUTREACH (OUTPATIENT)
Dept: CARE COORDINATION | Facility: CLINIC | Age: 49
End: 2024-08-08
Payer: COMMERCIAL

## 2024-09-04 ENCOUNTER — PATIENT OUTREACH (OUTPATIENT)
Dept: CARE COORDINATION | Facility: CLINIC | Age: 49
End: 2024-09-04
Payer: COMMERCIAL

## 2024-09-13 ENCOUNTER — TELEPHONE (OUTPATIENT)
Dept: NEUROSURGERY | Facility: CLINIC | Age: 49
End: 2024-09-13
Payer: COMMERCIAL

## 2024-09-13 NOTE — TELEPHONE ENCOUNTER
Left Voicemail (1st Attempt) and Sent Mychart (1st Attempt) for the patient to call back and schedule the following:    Location: [facility]  Provider: Benny  Appointment type: Return vascular  Appointment mode: Virtual / in person  Return date: 8/15/25    Specialty phone number: [phone]    Is Imaging Needed: Yes - MRA  Imaging Phone Number to provide to patient: [phone]    Additional Notes: MRA prior to clinic appointment

## 2024-09-18 NOTE — TELEPHONE ENCOUNTER
Left Voicemail (2nd Attempt) for the patient to call back and schedule the following:    Location: [facility] [place] [facility]  Provider: Benny  Appointment type: return vascular  Appointment mode: any  Return date: 8/15/25    Specialty phone number: [phone]    Is Imaging Needed: Yes - MRA  Imaging Phone Number to provide to patient: [phone]    Additional Notes: MRA prior to clinic visit

## 2024-10-05 ENCOUNTER — HEALTH MAINTENANCE LETTER (OUTPATIENT)
Age: 49
End: 2024-10-05

## 2025-08-26 ENCOUNTER — TELEPHONE (OUTPATIENT)
Dept: NEUROSURGERY | Facility: CLINIC | Age: 50
End: 2025-08-26
Payer: COMMERCIAL

## 2025-08-26 DIAGNOSIS — I77.0 AVF (ARTERIOVENOUS FISTULA): Primary | ICD-10-CM

## (undated) RX ORDER — FENTANYL CITRATE 50 UG/ML
INJECTION, SOLUTION INTRAMUSCULAR; INTRAVENOUS
Status: DISPENSED
Start: 2022-02-23

## (undated) RX ORDER — ONDANSETRON 2 MG/ML
INJECTION INTRAMUSCULAR; INTRAVENOUS
Status: DISPENSED
Start: 2022-05-12

## (undated) RX ORDER — ROCURONIUM BROMIDE 50 MG/5 ML
SYRINGE (ML) INTRAVENOUS
Status: DISPENSED
Start: 2022-05-12

## (undated) RX ORDER — FENTANYL CITRATE 50 UG/ML
INJECTION, SOLUTION INTRAMUSCULAR; INTRAVENOUS
Status: DISPENSED
Start: 2022-05-12

## (undated) RX ORDER — PROPOFOL 10 MG/ML
INJECTION, EMULSION INTRAVENOUS
Status: DISPENSED
Start: 2022-05-12

## (undated) RX ORDER — LIDOCAINE HYDROCHLORIDE 20 MG/ML
INJECTION, SOLUTION EPIDURAL; INFILTRATION; INTRACAUDAL; PERINEURAL
Status: DISPENSED
Start: 2022-05-12

## (undated) RX ORDER — LIDOCAINE HYDROCHLORIDE 10 MG/ML
INJECTION, SOLUTION EPIDURAL; INFILTRATION; INTRACAUDAL; PERINEURAL
Status: DISPENSED
Start: 2022-02-23

## (undated) RX ORDER — PROTAMINE SULFATE 10 MG/ML
INJECTION, SOLUTION INTRAVENOUS
Status: DISPENSED
Start: 2022-05-12

## (undated) RX ORDER — HYDROCODONE BITARTRATE AND ACETAMINOPHEN 5; 325 MG/1; MG/1
TABLET ORAL
Status: DISPENSED
Start: 2022-02-23

## (undated) RX ORDER — LIDOCAINE HYDROCHLORIDE 10 MG/ML
INJECTION, SOLUTION EPIDURAL; INFILTRATION; INTRACAUDAL; PERINEURAL
Status: DISPENSED
Start: 2022-05-12

## (undated) RX ORDER — HEPARIN SODIUM 200 [USP'U]/100ML
INJECTION, SOLUTION INTRAVENOUS
Status: DISPENSED
Start: 2022-05-12

## (undated) RX ORDER — OXYCODONE HYDROCHLORIDE 5 MG/1
TABLET ORAL
Status: DISPENSED
Start: 2022-05-12

## (undated) RX ORDER — HYDROMORPHONE HYDROCHLORIDE 1 MG/ML
INJECTION, SOLUTION INTRAMUSCULAR; INTRAVENOUS; SUBCUTANEOUS
Status: DISPENSED
Start: 2022-05-12

## (undated) RX ORDER — HEPARIN SODIUM 1000 [USP'U]/ML
INJECTION, SOLUTION INTRAVENOUS; SUBCUTANEOUS
Status: DISPENSED
Start: 2022-05-12

## (undated) RX ORDER — ACETAMINOPHEN 500 MG
TABLET ORAL
Status: DISPENSED
Start: 2022-05-12

## (undated) RX ORDER — SODIUM CHLORIDE 9 MG/ML
INJECTION, SOLUTION INTRAVENOUS
Status: DISPENSED
Start: 2022-05-12

## (undated) RX ORDER — SODIUM CHLORIDE 9 MG/ML
INJECTION, SOLUTION INTRAVENOUS
Status: DISPENSED
Start: 2022-02-23

## (undated) RX ORDER — DEXAMETHASONE SODIUM PHOSPHATE 4 MG/ML
INJECTION, SOLUTION INTRA-ARTICULAR; INTRALESIONAL; INTRAMUSCULAR; INTRAVENOUS; SOFT TISSUE
Status: DISPENSED
Start: 2022-05-12